# Patient Record
Sex: MALE | Race: WHITE | NOT HISPANIC OR LATINO | Employment: OTHER | URBAN - METROPOLITAN AREA
[De-identification: names, ages, dates, MRNs, and addresses within clinical notes are randomized per-mention and may not be internally consistent; named-entity substitution may affect disease eponyms.]

---

## 2021-07-07 ENCOUNTER — TELEPHONE (OUTPATIENT)
Dept: ORTHOPEDIC SURGERY | Facility: CLINIC | Age: 81
End: 2021-07-07

## 2021-07-07 NOTE — TELEPHONE ENCOUNTER
Dr. Oropeza is calling on behalf of the patient and says the patient is in a great deal of pain and would like to see if Dr. Ratliff can work him in sooner than his appointment scheduled for 7-15-21. Please advise.

## 2021-07-07 NOTE — TELEPHONE ENCOUNTER
Per RBB, he can see him as the last patient of the day tomorrow if this works for the patient.  Otherwise have to keep his appointment for next week.

## 2021-07-08 ENCOUNTER — OFFICE VISIT (OUTPATIENT)
Dept: ORTHOPEDIC SURGERY | Facility: CLINIC | Age: 81
End: 2021-07-08

## 2021-07-08 VITALS — WEIGHT: 209 LBS

## 2021-07-08 DIAGNOSIS — R52 PAIN: Primary | ICD-10-CM

## 2021-07-08 DIAGNOSIS — M79.605 PAIN OF LEFT LOWER EXTREMITY: ICD-10-CM

## 2021-07-08 PROCEDURE — 73630 X-RAY EXAM OF FOOT: CPT | Performed by: ORTHOPAEDIC SURGERY

## 2021-07-08 PROCEDURE — 99203 OFFICE O/P NEW LOW 30 MIN: CPT | Performed by: ORTHOPAEDIC SURGERY

## 2021-07-08 PROCEDURE — 73502 X-RAY EXAM HIP UNI 2-3 VIEWS: CPT | Performed by: ORTHOPAEDIC SURGERY

## 2021-07-08 RX ORDER — AMLODIPINE BESYLATE 10 MG/1
10 TABLET ORAL NIGHTLY
Status: ON HOLD | COMMUNITY
Start: 2021-06-01 | End: 2022-01-25

## 2021-07-08 RX ORDER — ATORVASTATIN CALCIUM 40 MG/1
40 TABLET, FILM COATED ORAL NIGHTLY
COMMUNITY
Start: 2021-05-27

## 2021-07-08 RX ORDER — TAMSULOSIN HYDROCHLORIDE 0.4 MG/1
1 CAPSULE ORAL
COMMUNITY
Start: 2021-05-19 | End: 2022-01-24

## 2021-07-08 RX ORDER — ATENOLOL 50 MG/1
50 TABLET ORAL NIGHTLY
COMMUNITY
Start: 2021-06-25 | End: 2022-07-31 | Stop reason: HOSPADM

## 2021-07-08 RX ORDER — METHYLPREDNISOLONE 4 MG/1
TABLET ORAL
Qty: 21 TABLET | Refills: 0 | Status: SHIPPED | OUTPATIENT
Start: 2021-07-08 | End: 2021-07-23

## 2021-07-08 RX ORDER — HYDROCHLOROTHIAZIDE 25 MG/1
25 TABLET ORAL NIGHTLY
COMMUNITY
Start: 2021-06-10 | End: 2022-11-03

## 2021-07-08 NOTE — PROGRESS NOTES
Patient: Sohan De La Torre  YOB: 1940 80 y.o. male  Medical Record Number: [id number]    Chief Complaints:   Chief Complaint   Patient presents with   • Left Hip - Pain   • Left Foot - Pain       History of Present Illness:Sohan De La Torre is a 80 y.o. male who presents with left leg numbness he has pain and numbness which will radiate all the way down the anterior thigh into the lateral calf and toes.  It started about 10 days ago.  He denies a history of trauma or change in activity level.  He denies any previous lumbar spine issues.  He states the leg feels weak and as though it wants to give out on him.    Allergies: No Known Allergies    Medications:   Current Outpatient Medications   Medication Sig Dispense Refill   • amLODIPine (NORVASC) 10 MG tablet      • atenolol (TENORMIN) 50 MG tablet      • atorvastatin (LIPITOR) 40 MG tablet      • hydroCHLOROthiazide (HYDRODIURIL) 25 MG tablet      • tamsulosin (FLOMAX) 0.4 MG capsule 24 hr capsule      • methylPREDNISolone (MEDROL) 4 MG dose pack Use as directed by package instructions 21 tablet 0     No current facility-administered medications for this visit.         The following portions of the patient's history were reviewed and updated as appropriate: allergies, current medications, past family history, past medical history, past social history, past surgical history and problem list.    Review of Systems:   A 14 point review of systems was performed. All systems negative except pertinent positives/negative listed in HPI above    Physical Exam:   Vitals:    07/08/21 1625   Weight: 94.8 kg (209 lb)       General: A and O x 3, ASA, NAD    SCLERA:    Normal    DENTITION:   Normal  There is a positive straight leg raise.  He has some weakness with leg extension in comparison to opposite side.  There is no pain or limitation of hip or knee range of motion he walks with a slight antalgic gait.  The skin is normal in appearance.       Radiology:  Xrays  2views left hip  (AP bilateral hips, and lateral of the hip) were ordered and reviewed demonstrating  no abnormality.  There are no previous notes or comparison.    X-rays 3 views of left foot ordered and reviewed for evaluation of foot pain which show no abnormality.  There are no previous films or comparison.    Assessment/Plan: Acute onset of severe left hip leg pain numbness and weakness.  I am concerned this is lumbar radicular in origin I am going to get a stat MRI given the weakness and numbness and as soon as we get some information we will call him back.  In the meantime starting him on a Medrol Dosepak.      Roger Ratliff MD  7/8/2021

## 2021-07-13 ENCOUNTER — TELEPHONE (OUTPATIENT)
Dept: ORTHOPEDIC SURGERY | Facility: CLINIC | Age: 81
End: 2021-07-13

## 2021-07-13 DIAGNOSIS — M54.16 LUMBAR RADICULOPATHY: Primary | ICD-10-CM

## 2021-07-23 ENCOUNTER — HOSPITAL ENCOUNTER (OUTPATIENT)
Dept: GENERAL RADIOLOGY | Facility: HOSPITAL | Age: 81
Discharge: HOME | End: 2021-07-23

## 2021-07-23 ENCOUNTER — HOSPITAL ENCOUNTER (OUTPATIENT)
Dept: PAIN MEDICINE | Facility: HOSPITAL | Age: 81
Discharge: HOME | End: 2021-07-23

## 2021-07-23 ENCOUNTER — ANESTHESIA (OUTPATIENT)
Dept: PAIN MEDICINE | Facility: HOSPITAL | Age: 81
End: 2021-07-23

## 2021-07-23 ENCOUNTER — ANESTHESIA EVENT (OUTPATIENT)
Dept: PAIN MEDICINE | Facility: HOSPITAL | Age: 81
End: 2021-07-23

## 2021-07-23 VITALS
RESPIRATION RATE: 16 BRPM | HEART RATE: 81 BPM | TEMPERATURE: 97.7 F | OXYGEN SATURATION: 96 % | SYSTOLIC BLOOD PRESSURE: 120 MMHG | DIASTOLIC BLOOD PRESSURE: 75 MMHG

## 2021-07-23 DIAGNOSIS — R52 PAIN: ICD-10-CM

## 2021-07-23 DIAGNOSIS — M48.061 LUMBAR FORAMINAL STENOSIS: Primary | ICD-10-CM

## 2021-07-23 PROCEDURE — 25010000002 METHYLPREDNISOLONE PER 80 MG: Performed by: ANESTHESIOLOGY

## 2021-07-23 PROCEDURE — 0 IOPAMIDOL 41 % SOLUTION: Performed by: ANESTHESIOLOGY

## 2021-07-23 PROCEDURE — 77003 FLUOROGUIDE FOR SPINE INJECT: CPT

## 2021-07-23 RX ORDER — MIDAZOLAM HYDROCHLORIDE 1 MG/ML
1 INJECTION INTRAMUSCULAR; INTRAVENOUS AS NEEDED
Status: DISCONTINUED | OUTPATIENT
Start: 2021-07-23 | End: 2021-07-24 | Stop reason: HOSPADM

## 2021-07-23 RX ORDER — FENTANYL CITRATE 50 UG/ML
50 INJECTION, SOLUTION INTRAMUSCULAR; INTRAVENOUS AS NEEDED
Status: DISCONTINUED | OUTPATIENT
Start: 2021-07-23 | End: 2021-07-24 | Stop reason: HOSPADM

## 2021-07-23 RX ORDER — LIDOCAINE HYDROCHLORIDE 10 MG/ML
1 INJECTION, SOLUTION INFILTRATION; PERINEURAL ONCE AS NEEDED
Status: DISCONTINUED | OUTPATIENT
Start: 2021-07-23 | End: 2021-07-24 | Stop reason: HOSPADM

## 2021-07-23 RX ORDER — SODIUM CHLORIDE 0.9 % (FLUSH) 0.9 %
1-10 SYRINGE (ML) INJECTION AS NEEDED
Status: DISCONTINUED | OUTPATIENT
Start: 2021-07-23 | End: 2021-07-24 | Stop reason: HOSPADM

## 2021-07-23 RX ORDER — METHYLPREDNISOLONE ACETATE 80 MG/ML
80 INJECTION, SUSPENSION INTRA-ARTICULAR; INTRALESIONAL; INTRAMUSCULAR; SOFT TISSUE ONCE
Status: COMPLETED | OUTPATIENT
Start: 2021-07-23 | End: 2021-07-23

## 2021-07-23 RX ADMIN — IOPAMIDOL 10 ML: 408 INJECTION, SOLUTION INTRATHECAL at 12:19

## 2021-07-23 RX ADMIN — METHYLPREDNISOLONE ACETATE 80 MG: 80 INJECTION, SUSPENSION INTRA-ARTICULAR; INTRALESIONAL; INTRAMUSCULAR; SOFT TISSUE at 12:19

## 2021-07-23 NOTE — ANESTHESIA PROCEDURE NOTES
PAIN Epidural block    Pre-sedation assessment completed: 7/23/2021 12:10 PM    Patient reassessed immediately prior to procedure    Patient location during procedure: pain clinic  Indication:procedure for pain  Performed By  Anesthesiologist: Tani Li MD  Preanesthetic Checklist  Completed: patient identified, IV checked, site marked, risks and benefits discussed, surgical consent, monitors and equipment checked, pre-op evaluation and timeout performed  Additional Notes  Performed under fluoroscopy  Post-Op Diagnosis Codes:     * Lumbar degenerative disc disease (M51.36)     * Lumbar foraminal stenosis (M48.061)  Prep:  Pt Position:prone  Sterile Tech:cap, gloves, mask and sterile barrier  Prep:chlorhexidine gluconate and isopropyl alcohol  Monitoring:blood pressure monitoring, continuous pulse oximetry and EKG  Procedure:Sedation: no     Approach:left paramedian  Guidance: fluoroscopy  Location:lumbar (Intralaminar)  Level:L5-S1  Needle Type:Tuohy  Needle Gauge:20 G  Aspiration:negative  Medications:  Depomedrol:80 mg  Preservative Free Saline:4mL  Isovue:3mL  Comments:Needle placement confirmed with lateral fluroscopy and epidural spread of contrast injection.  Post Assessment:  Post-procedure: Bandaid.  Pt Tolerance:patient tolerated the procedure well with no apparent complications  Complications:no

## 2021-07-23 NOTE — H&P
[facility]    History and Physical    Patient Name: Sohan De La Torre  :  1940  MRN:  [id number]  Date of Admission: 2021    Subjective     Patient is a 80 y.o. male presents with chief complaint of acute leg: left pain.  Onset of symptoms was abrupt starting several weeks ago.  Symptoms are associated/aggravated by activity, lifting, standing or walking for more than a few minutes. Symptoms improve with ice. His lumbar MRI report showed degeneration and foraminal stenosis worse at L5-S1.    The following portions of the patients history were reviewed and updated as appropriate: current medications, allergies, past medical history, past surgical history, past family history, past social history and problem list                Objective     Past Medical History:   Past Medical History:   Diagnosis Date   • Hyperlipidemia    • Hypertension    • Low back pain      Past Surgical History: History reviewed. No pertinent surgical history.  Family History: No family history on file.  Social History:   Social History     Socioeconomic History   • Marital status:      Spouse name: Not on file   • Number of children: Not on file   • Years of education: Not on file   • Highest education level: Not on file   Tobacco Use   • Smoking status: Former Smoker   • Smokeless tobacco: Never Used   • Tobacco comment: 25 YEARS AGO   Substance and Sexual Activity   • Alcohol use: Yes     Comment: 2-3 TIMES WEEKLY   • Drug use: Never   • Sexual activity: Defer       Vital Signs Range for the last 24 hours  Temperature: Temp:  [36.5 °C (97.7 °F)] 36.5 °C (97.7 °F)   Temp Source: Temp src: Infrared   BP: BP: (138)/(89) 138/89   Pulse: Heart Rate:  [92] 92   Respirations: Resp:  [16] 16   SPO2: SpO2:  [98 %] 98 %   O2 Amount (l/min):     O2 Devices Device (Oxygen Therapy): room air   Weight:           --------------------------------------------------------------------------------    Current Outpatient Medications    Medication Sig Dispense Refill   • amLODIPine (NORVASC) 10 MG tablet      • atenolol (TENORMIN) 50 MG tablet      • atorvastatin (LIPITOR) 40 MG tablet      • hydroCHLOROthiazide (HYDRODIURIL) 25 MG tablet      • tamsulosin (FLOMAX) 0.4 MG capsule 24 hr capsule        Current Facility-Administered Medications   Medication Dose Route Frequency Provider Last Rate Last Admin   • fentaNYL citrate (PF) (SUBLIMAZE) injection 50 mcg  50 mcg Intravenous PRN Tani Li MD       • iopamidol (ISOVUE-M 200) injection 41%  12 mL Epidural Once in imaging Tani Li MD       • lidocaine (XYLOCAINE) 1 % injection 1 mL  1 mL Intradermal Once PRN Tani Li MD       • methylPREDNISolone acetate (DEPO-medrol) injection 80 mg  80 mg Intra-articular Once Tani Li MD       • midazolam (VERSED) injection 1 mg  1 mg Intravenous PRN Tani Li MD       • sodium chloride 0.9 % flush 1-10 mL  1-10 mL Intravenous PRN Tani Li MD           --------------------------------------------------------------------------------  Assessment/Plan      Anesthesia Evaluation           Pain impairs ability to perform ADLs: Ambulation, Exercise/Activity and Sleeping  Modalities previously tried to control pain with limited effectiveness within the last 4-6 weeks: Ice and Rest     Airway   Mallampati: II  Dental - normal exam     Pulmonary - normal exam    breath sounds clear to auscultation  Cardiovascular - normal exam        Neuro/Psych  normal reflexes and symmetric  (-) strength not normal in all 4 extremities, left straight leg raise test, right straight leg raise test  GI/Hepatic/Renal/Endo      Musculoskeletal     Abdominal    Substance History      OB/GYN          Other                   Diagnosis and Plan    Treatment Plan  ASA 2      Procedures: Lumbar Epidural Steroid Injection(LESI), With fluoroscopy,       Anesthetic plan and risks discussed with patient.          Diagnosis     * Lumbar degenerative disc disease  [M51.36]     * Lumbar foraminal stenosis [M48.061]

## 2021-08-09 ENCOUNTER — TELEPHONE (OUTPATIENT)
Dept: PAIN MEDICINE | Facility: HOSPITAL | Age: 81
End: 2021-08-09

## 2021-08-09 NOTE — TELEPHONE ENCOUNTER
..What would you rate your pain on a 1-10 scale...   Prior to your last procedure? 8   On the best days following your last procedure? 7   Currently? 8    How frequently were you having pain...   Prior to your last procedure? constantly   On the best days following your last procedure?  constantly   Currently? constantly    When you do have pain does an episode last as long as before your last procedure? yes  What has been the difference?      Has your function improved?  Are you walking farther or more often? I can sit down with ice packs on me   If so, how much farther and how much more often than before your last procedure?    Have you tried/continued to try any other treatments since your last procedure? (Chiropractor, physical therapy, massage, yoga, back exercises, heat/ice) have done the exercises in the discharge papers, I use ice    What medications are you currently taking to help with your pain? Ibuprofen  (Narcotics, muscle relaxer's, NSAID's, Tylenol, other)    Considering all factors what % improvement would you say you have had overall? 0%   At its best following your last procedure? 10%   Currently? 10%    Is there anything else you'd like to tell us that has improved since your last procedure? Pain limits my activites, getting about 4 hours sleep then get up and get ice

## 2021-08-20 ENCOUNTER — HOSPITAL ENCOUNTER (OUTPATIENT)
Dept: GENERAL RADIOLOGY | Facility: HOSPITAL | Age: 81
Discharge: HOME | End: 2021-08-20

## 2021-08-20 ENCOUNTER — HOSPITAL ENCOUNTER (OUTPATIENT)
Dept: PAIN MEDICINE | Facility: HOSPITAL | Age: 81
Discharge: HOME | End: 2021-08-20

## 2021-08-20 ENCOUNTER — ANESTHESIA EVENT (OUTPATIENT)
Dept: PAIN MEDICINE | Facility: HOSPITAL | Age: 81
End: 2021-08-20

## 2021-08-20 ENCOUNTER — ANESTHESIA (OUTPATIENT)
Dept: PAIN MEDICINE | Facility: HOSPITAL | Age: 81
End: 2021-08-20

## 2021-08-20 VITALS
SYSTOLIC BLOOD PRESSURE: 136 MMHG | RESPIRATION RATE: 16 BRPM | DIASTOLIC BLOOD PRESSURE: 79 MMHG | TEMPERATURE: 97.3 F | OXYGEN SATURATION: 96 % | HEART RATE: 74 BPM

## 2021-08-20 DIAGNOSIS — M54.16 LUMBAR RADICULOPATHY: Primary | ICD-10-CM

## 2021-08-20 DIAGNOSIS — R52 PAIN: ICD-10-CM

## 2021-08-20 PROCEDURE — 77003 FLUOROGUIDE FOR SPINE INJECT: CPT

## 2021-08-20 PROCEDURE — 0 IOPAMIDOL 41 % SOLUTION: Performed by: ANESTHESIOLOGY

## 2021-08-20 PROCEDURE — 25010000002 METHYLPREDNISOLONE PER 80 MG: Performed by: ANESTHESIOLOGY

## 2021-08-20 RX ORDER — LIDOCAINE HYDROCHLORIDE 10 MG/ML
1 INJECTION, SOLUTION INFILTRATION; PERINEURAL ONCE
Status: DISCONTINUED | OUTPATIENT
Start: 2021-08-20 | End: 2021-08-21 | Stop reason: HOSPADM

## 2021-08-20 RX ORDER — FENTANYL CITRATE 50 UG/ML
50 INJECTION, SOLUTION INTRAMUSCULAR; INTRAVENOUS AS NEEDED
Status: DISCONTINUED | OUTPATIENT
Start: 2021-08-20 | End: 2021-08-21 | Stop reason: HOSPADM

## 2021-08-20 RX ORDER — MIDAZOLAM HYDROCHLORIDE 1 MG/ML
1 INJECTION INTRAMUSCULAR; INTRAVENOUS
Status: DISCONTINUED | OUTPATIENT
Start: 2021-08-20 | End: 2021-08-21 | Stop reason: HOSPADM

## 2021-08-20 RX ORDER — METHYLPREDNISOLONE ACETATE 80 MG/ML
80 INJECTION, SUSPENSION INTRA-ARTICULAR; INTRALESIONAL; INTRAMUSCULAR; SOFT TISSUE ONCE
Status: COMPLETED | OUTPATIENT
Start: 2021-08-20 | End: 2021-08-20

## 2021-08-20 RX ADMIN — METHYLPREDNISOLONE ACETATE 80 MG: 80 INJECTION, SUSPENSION INTRA-ARTICULAR; INTRALESIONAL; INTRAMUSCULAR; SOFT TISSUE at 08:57

## 2021-08-20 RX ADMIN — IOPAMIDOL 10 ML: 408 INJECTION, SOLUTION INTRATHECAL at 08:57

## 2021-08-20 NOTE — ANESTHESIA PROCEDURE NOTES
PAIN Epidural block      Patient reassessed immediately prior to procedure    Patient location during procedure: pain clinic  Indication:procedure for pain  Performed By  Anesthesiologist: Ronn Bonilla MD  Preanesthetic Checklist  Completed: patient identified, site marked, risks and benefits discussed, surgical consent, monitors and equipment checked, pre-op evaluation and timeout performed  Additional Notes  Depomedrol - 80mg    Needle position confirmed by fluoroscopy and epidurogram using 2cc of [id number].    Diagnosis  Post-Op Diagnosis Codes:     * Lumbar radiculopathy (M54.16)     * Lumbar degenerative disc disease (M51.36)     * Lumbar foraminal stenosis (M48.061)    Prep:  Pt Position:prone  Sterile Tech:cap, gloves, mask and sterile barrier  Prep:chlorhexidine gluconate and isopropyl alcohol  Monitoring:blood pressure monitoring, continuous pulse oximetry and EKG  Procedure:Sedation: no     Approach:left paramedian  Guidance: fluoroscopy  Location:lumbar  Level:L5-S1  Needle Type:Tuohy  Needle Gauge:20  Aspiration:negative  Medications:  Depomedrol:80 mg  Preservative Free Saline:2mL  Isovue:2mL    Post Assessment:  Post-procedure: bandaide.  Pt Tolerance:patient tolerated the procedure well with no apparent complications  Complications:no

## 2021-08-20 NOTE — INTERVAL H&P NOTE
[facility]  H&P reviewed. No changes since last visit.  Patient states   25-50% improvement since the last procedure/injection.    Diagnosis     * Lumbar radiculopathy [M54.16]     * Lumbar degenerative disc disease [M51.36]     * Lumbar foraminal stenosis [M48.061]      Airway assessed since last visit. Airway class equals: 2.

## 2021-09-01 ENCOUNTER — TELEPHONE (OUTPATIENT)
Dept: NEUROSURGERY | Facility: CLINIC | Age: 81
End: 2021-09-01

## 2021-09-02 ENCOUNTER — OFFICE VISIT (OUTPATIENT)
Dept: NEUROSURGERY | Facility: CLINIC | Age: 81
End: 2021-09-02

## 2021-09-02 VITALS — TEMPERATURE: 99.1 F | DIASTOLIC BLOOD PRESSURE: 92 MMHG | WEIGHT: 203.2 LBS | SYSTOLIC BLOOD PRESSURE: 148 MMHG

## 2021-09-02 DIAGNOSIS — Q76.2 CONGENITAL SPONDYLOLYSIS, LUMBOSACRAL REGION: Primary | ICD-10-CM

## 2021-09-02 DIAGNOSIS — M54.16 LEFT LUMBAR RADICULOPATHY: ICD-10-CM

## 2021-09-02 PROCEDURE — 99204 OFFICE O/P NEW MOD 45 MIN: CPT | Performed by: NEUROLOGICAL SURGERY

## 2021-09-02 RX ORDER — GABAPENTIN 300 MG/1
CAPSULE ORAL
Qty: 60 CAPSULE | Refills: 3 | Status: SHIPPED | OUTPATIENT
Start: 2021-09-02 | End: 2022-01-14

## 2021-09-02 NOTE — PROGRESS NOTES
Subjective   Patient ID: Sohan De La Torre is a 80 y.o. male is being seen for consultation today at the request of Dr. Oropeza.  He is being seen for low back pain.      7/8/21 MRI [facility]    Today patient reports left leg pain and left foot pain.  He states he has plantar fasciatis.  The patient is a retired .  He is generally healthy and very active.  He has had a history of some probable plantar fasciitis in the sole of his left foot.  About 3 or 4 months ago, he began having some left-sided buttock and radiating leg pain that has progressed and continued unabated.  He seems to feel better when he is sitting or lying down, but when he stands or walks he has more pain and cannot really get very far.  He has been through physical therapy, which did not help.  He has had 2 epidural blocks, which did not help.  He had an MRI, which showed grade 2 L5-S1 isthmic spondylolisthesis with root compression and a pseudodisk extrusion.  He is here for my opinion.  He said his life has been totally turned around and he feels completely dysfunctional.  He takes ibuprofen, which does not help. I told him that I thought if he did not feel like he could live with the symptoms that a L5-S1 decompression and fusion with interbody cages and transcortical pedicle screw fixation would be the next step.  He was not quite sure that he was ready for that.  I told him that we could try some gabapentin at 300 mg b.i.d.  It could help, but I doubt that it would get rid of it entirely.  I do not think a 3rd block is worth doing since 2 did not help.  He will try the gabapentin and we will do a TeleVisit in 3 weeks to see how he is doing.  He does have a history of aortic stenosis that was discovered almost incidentally.  If we were to move forward with surgery, he would need cardiac clearance.  We will wait on that until we do the TeleVisit in 3 weeks.         leg    Leg Pain   The pain is present in the left leg and left  foot. The quality of the pain is described as burning. The pain has been intermittent since onset. Associated symptoms include an inability to bear weight and numbness. He has tried ice for the symptoms. The treatment provided mild relief.       The following portions of the patient's history were reviewed and updated as appropriate: allergies, current medications, past family history, past medical history, past social history, past surgical history and problem list.    Review of Systems   Constitutional: Negative for chills and fever.   HENT: Negative for congestion.    Eyes: Negative for visual disturbance.   Respiratory: Negative for shortness of breath.    Cardiovascular: Negative for palpitations.   Gastrointestinal: Negative for nausea.   Endocrine: Negative for cold intolerance.   Genitourinary: Negative for urgency.   Musculoskeletal: Negative for neck pain.   Skin: Negative for rash.   Allergic/Immunologic: Negative for environmental allergies.   Neurological: Positive for numbness.   Hematological: Negative for adenopathy.   Psychiatric/Behavioral: Negative for sleep disturbance.   All other systems reviewed and are negative.          Objective     Vitals:    09/02/21 1241   BP: 148/92   Temp: 99.1 °F (37.3 °C)   Weight: 92.2 kg (203 lb 3.2 oz)     There is no height or weight on file to calculate BMI.      Physical Exam  Constitutional:       Appearance: He is well-developed.   HENT:      Head: Normocephalic and atraumatic.   Eyes:      Extraocular Movements: EOM normal.      Conjunctiva/sclera: Conjunctivae normal.      Pupils: Pupils are equal, round, and reactive to light.   Neck:      Vascular: No carotid bruit.   Neurological:      Mental Status: He is oriented to person, place, and time.      Coordination: Finger-Nose-Finger Test and Heel to Shin Test normal.      Gait: Gait is intact.      Deep Tendon Reflexes:      Reflex Scores:       Tricep reflexes are 2+ on the right side and 2+ on the left  side.       Bicep reflexes are 2+ on the right side and 2+ on the left side.       Brachioradialis reflexes are 2+ on the right side and 2+ on the left side.       Patellar reflexes are 2+ on the right side and 2+ on the left side.       Achilles reflexes are 2+ on the right side and 2+ on the left side.  Psychiatric:         Speech: Speech normal.       Neurologic Exam     Mental Status   Oriented to person, place, and time.   Registration of memory: Good recent and remote memory.   Attention: normal. Concentration: normal.   Speech: speech is normal   Level of consciousness: alert  Knowledge: consistent with education.     Cranial Nerves     CN II   Visual fields full to confrontation.   Visual acuity: normal    CN III, IV, VI   Pupils are equal, round, and reactive to light.  Extraocular motions are normal.     CN V   Facial sensation intact.   Right corneal reflex: normal  Left corneal reflex: normal    CN VII   Facial expression full, symmetric.   Right facial weakness: none  Left facial weakness: none    CN VIII   Hearing: intact    CN IX, X   Palate: symmetric    CN XI   Right sternocleidomastoid strength: normal  Left sternocleidomastoid strength: normal    CN XII   Tongue: not atrophic  Tongue deviation: none    Motor Exam   Muscle bulk: normal  Right arm tone: normal  Left arm tone: normal  Right leg tone: normal  Left leg tone: normal    Strength   Strength 5/5 except as noted.     Sensory Exam   Light touch normal.     Gait, Coordination, and Reflexes     Gait  Gait: normal    Coordination   Finger to nose coordination: normal  Heel to shin coordination: normal    Reflexes   Right brachioradialis: 2+  Left brachioradialis: 2+  Right biceps: 2+  Left biceps: 2+  Right triceps: 2+  Left triceps: 2+  Right patellar: 2+  Left patellar: 2+  Right achilles: 2+  Left achilles: 2+  Right : 2+  Left : 2+          Assessment/Plan   Independent Review of Radiographic Studies:      I personally reviewed the  images from the following studies.    I reviewed the lumbar MRI done on 7/8/2021 which shows an L5-S1 isthmic spondylolisthesis with a grade 2 slip and bilateral pars defects.  Agree with the report.    Medical Decision Making:      We will try gabapentin at 300 mg twice daily.  He will think about the recommendation for surgery.  We will do a televisit in 3 weeks.  If he wants to move forward with surgery, we will set that up as described above but we will need cardiac clearance first.      Diagnoses and all orders for this visit:    1. Congenital spondylolysis, lumbosacral region (Primary)  -     gabapentin (NEURONTIN) 300 MG capsule; Take 1 capsule p.o. nightly x7 days then twice daily  Dispense: 60 capsule; Refill: 3    2. Left lumbar radiculopathy  -     gabapentin (NEURONTIN) 300 MG capsule; Take 1 capsule p.o. nightly x7 days then twice daily  Dispense: 60 capsule; Refill: 3      Return in about 3 weeks (around 9/23/2021) for As a televisit.

## 2022-01-13 ENCOUNTER — TRANSCRIBE ORDERS (OUTPATIENT)
Dept: ADMINISTRATIVE | Facility: HOSPITAL | Age: 82
End: 2022-01-13

## 2022-01-13 DIAGNOSIS — I96 GANGRENE: ICD-10-CM

## 2022-01-13 DIAGNOSIS — I73.9 PVD (PERIPHERAL VASCULAR DISEASE): Primary | ICD-10-CM

## 2022-01-13 DIAGNOSIS — I65.23 CAROTID STENOSIS, BILATERAL: ICD-10-CM

## 2022-01-14 ENCOUNTER — HOSPITAL ENCOUNTER (OUTPATIENT)
Dept: CARDIOLOGY | Facility: HOSPITAL | Age: 82
Discharge: HOME | End: 2022-01-14

## 2022-01-14 ENCOUNTER — OFFICE VISIT (OUTPATIENT)
Dept: CARDIOLOGY | Facility: CLINIC | Age: 82
End: 2022-01-14

## 2022-01-14 VITALS
WEIGHT: 205.2 LBS | HEIGHT: 71 IN | DIASTOLIC BLOOD PRESSURE: 72 MMHG | SYSTOLIC BLOOD PRESSURE: 122 MMHG | HEART RATE: 74 BPM | BODY MASS INDEX: 28.73 KG/M2 | OXYGEN SATURATION: 97 %

## 2022-01-14 DIAGNOSIS — E78.00 HYPERCHOLESTEROLEMIA: ICD-10-CM

## 2022-01-14 DIAGNOSIS — I73.9 PVD (PERIPHERAL VASCULAR DISEASE): ICD-10-CM

## 2022-01-14 DIAGNOSIS — R06.02 EXERTIONAL SHORTNESS OF BREATH: ICD-10-CM

## 2022-01-14 DIAGNOSIS — I10 ESSENTIAL HYPERTENSION: Primary | ICD-10-CM

## 2022-01-14 DIAGNOSIS — I96 GANGRENE: ICD-10-CM

## 2022-01-14 DIAGNOSIS — Z01.810 PREOPERATIVE CARDIOVASCULAR EXAMINATION: ICD-10-CM

## 2022-01-14 DIAGNOSIS — I65.23 CAROTID STENOSIS, BILATERAL: ICD-10-CM

## 2022-01-14 LAB
BH CV LOWER ARTERIAL LEFT ABI RATIO: 0.48
BH CV LOWER ARTERIAL LEFT DORSALIS PEDIS SYS MAX: 68 MMHG
BH CV LOWER ARTERIAL LEFT GREAT TOE SYS MAX: 28 MMHG
BH CV LOWER ARTERIAL LEFT HIGH THIGH SYS MAX: 147 MMHG
BH CV LOWER ARTERIAL LEFT LOW THIGH SYS MAX: 95 MMHG
BH CV LOWER ARTERIAL LEFT POPLITEAL SYS MAX: 70 MMHG
BH CV LOWER ARTERIAL LEFT POST TIBIAL SYS MAX: 0 MMHG
BH CV LOWER ARTERIAL LEFT TBI RATIO: 0.2
BH CV LOWER ARTERIAL RIGHT ABI RATIO: 0.72
BH CV LOWER ARTERIAL RIGHT DORSALIS PEDIS SYS MAX: 101 MMHG
BH CV LOWER ARTERIAL RIGHT HIGH THIGH SYS MAX: 157 MMHG
BH CV LOWER ARTERIAL RIGHT POPLITEAL SYS MAX: 96 MMHG
BH CV LOWER ARTERIAL RIGHT POST TIBIAL SYS MAX: 84 MMHG
BH CV XLRA MEAS - DIST GSV CALF DIST LEFT: 0.34 CM
BH CV XLRA MEAS - DIST GSV THIGH DIST LEFT: 0.37 CM
BH CV XLRA MEAS - DIST LSV CALF DIST LEFT: 0.24 CM
BH CV XLRA MEAS - GSV ANKLE DIST LEFT: 0.33 CM
BH CV XLRA MEAS - GSV KNEE DIST LEFT: 0.33 CM
BH CV XLRA MEAS - GSV ORIGIN DIST LEFT: 0.61 CM
BH CV XLRA MEAS - MID GSV CALF LEFT: 0.3 CM
BH CV XLRA MEAS - MID GSV THIGH  LEFT: 0.35 CM
BH CV XLRA MEAS - MID LSV CALF DIST LEFT: 0.21 CM
BH CV XLRA MEAS - PROX GSV CALF DIST LEFT: 0.26 CM
BH CV XLRA MEAS - PROX GSV THIGH  LEFT: 0.69 CM
BH CV XLRA MEAS - PROX LSV CALF DIST LEFT: 0.17 CM
BH CV XLRA MEAS LEFT CCA RATIO VEL: -87.4 CM/SEC
BH CV XLRA MEAS LEFT DIST CCA EDV: 17.6 CM/SEC
BH CV XLRA MEAS LEFT DIST CCA PSV: 87.4 CM/SEC
BH CV XLRA MEAS LEFT DIST ICA EDV: 24.6 CM/SEC
BH CV XLRA MEAS LEFT DIST ICA PSV: 92.6 CM/SEC
BH CV XLRA MEAS LEFT ICA RATIO VEL: -96.2 CM/SEC
BH CV XLRA MEAS LEFT ICA/CCA RATIO: 1.1
BH CV XLRA MEAS LEFT MID ICA EDV: 25.8 CM/SEC
BH CV XLRA MEAS LEFT MID ICA PSV: 96.2 CM/SEC
BH CV XLRA MEAS LEFT PROX CCA EDV: 15.8 CM/SEC
BH CV XLRA MEAS LEFT PROX CCA PSV: 87.4 CM/SEC
BH CV XLRA MEAS LEFT PROX ECA EDV: 11.8 CM/SEC
BH CV XLRA MEAS LEFT PROX ECA PSV: 148 CM/SEC
BH CV XLRA MEAS LEFT PROX ICA EDV: 17.3 CM/SEC
BH CV XLRA MEAS LEFT PROX ICA PSV: 51.9 CM/SEC
BH CV XLRA MEAS LEFT PROX SCLA PSV: 136 CM/SEC
BH CV XLRA MEAS LEFT VERTEBRAL A EDV: 10.7 CM/SEC
BH CV XLRA MEAS LEFT VERTEBRAL A PSV: 42.9 CM/SEC
BH CV XLRA MEAS RIGHT CCA RATIO VEL: -64.4 CM/SEC
BH CV XLRA MEAS RIGHT DIST CCA EDV: 11 CM/SEC
BH CV XLRA MEAS RIGHT DIST CCA PSV: 64.4 CM/SEC
BH CV XLRA MEAS RIGHT DIST ICA EDV: 20.4 CM/SEC
BH CV XLRA MEAS RIGHT DIST ICA PSV: 74.3 CM/SEC
BH CV XLRA MEAS RIGHT ICA RATIO VEL: 156 CM/SEC
BH CV XLRA MEAS RIGHT ICA/CCA RATIO: -2.4
BH CV XLRA MEAS RIGHT MID CCA EDV: 12.6 CM/SEC
BH CV XLRA MEAS RIGHT MID CCA PSV: 59.3 CM/SEC
BH CV XLRA MEAS RIGHT MID ICA EDV: 42.4 CM/SEC
BH CV XLRA MEAS RIGHT MID ICA PSV: 156 CM/SEC
BH CV XLRA MEAS RIGHT PROX CCA EDV: 12.9 CM/SEC
BH CV XLRA MEAS RIGHT PROX CCA PSV: 68 CM/SEC
BH CV XLRA MEAS RIGHT PROX ECA EDV: 12.8 CM/SEC
BH CV XLRA MEAS RIGHT PROX ECA PSV: 141 CM/SEC
BH CV XLRA MEAS RIGHT PROX ICA EDV: 33.8 CM/SEC
BH CV XLRA MEAS RIGHT PROX ICA PSV: 130 CM/SEC
BH CV XLRA MEAS RIGHT PROX SCLA PSV: 95.7 CM/SEC
BH CV XLRA MEAS RIGHT VERTEBRAL A EDV: 20.8 CM/SEC
BH CV XLRA MEAS RIGHT VERTEBRAL A PSV: 66.8 CM/SEC
LEFT ARM BP: NORMAL MMHG
MAXIMAL PREDICTED HEART RATE: 140 BPM
MAXIMAL PREDICTED HEART RATE: 140 BPM
RIGHT ARM BP: NORMAL MMHG
STRESS TARGET HR: 119 BPM
STRESS TARGET HR: 119 BPM
UPPER ARTERIAL LEFT ARM BRACHIAL SYS MAX: 134 MMHG
UPPER ARTERIAL RIGHT ARM BRACHIAL SYS MAX: 141 MMHG

## 2022-01-14 PROCEDURE — 93880 EXTRACRANIAL BILAT STUDY: CPT

## 2022-01-14 PROCEDURE — 93971 EXTREMITY STUDY: CPT

## 2022-01-14 PROCEDURE — 99204 OFFICE O/P NEW MOD 45 MIN: CPT | Performed by: INTERNAL MEDICINE

## 2022-01-14 PROCEDURE — 93923 UPR/LXTR ART STDY 3+ LVLS: CPT

## 2022-01-14 PROCEDURE — 93000 ELECTROCARDIOGRAM COMPLETE: CPT | Performed by: INTERNAL MEDICINE

## 2022-01-14 NOTE — PROGRESS NOTES
PATIENTINFORMATION    Date of Office Visit: 2022  Encounter Provider: Garbiel Joel MD  Place of Service: [facility] CARDIOLOGY  Patient Name: Sohan De La Torre  : 1940    Subjective:     Encounter Date:2022      Patient ID: Sohan De La Torre is a 81 y.o. male.    Chief Complaint   Patient presents with   • Surgical Clearance     HPI  Mr. De La Torre is a  pleasant 81 years old retired  and with past medical history of hypertension, hyperlipidemia referred to cardiology clinic for preoperative cardiovascular examination for possible left lower extremity intervention for recently diagnosed.  He has had left leg and foot pain and swelling and fracture of middle toe for the past few months that eventually prompted Doppler examination and found out to have occlusion of superficial femoral, popliteal arteries on the left side.  He also reports diagnosis of heart murmur many years ago and was told he has aortic valve stenosis.  He has some exertional shortness of breath but denies any significant chest discomfort.  He used to be very active and exercised a lot when he was younger.  Does not do regular exercise currently.  He has claudication pains just walking 50 feet.  He denies any current tobacco, recreational drug use or alcohol abuse.    No prior coronary angiogram or interventions.    ROS    All systems reviewed and negative except as noted    Past Medical History:   Diagnosis Date   • Hyperlipidemia    • Hypertension    • Low back pain        History reviewed. No pertinent surgical history.    Social History     Socioeconomic History   • Marital status:    Tobacco Use   • Smoking status: Former Smoker   • Smokeless tobacco: Never Used   • Tobacco comment: 25 YEARS AGO   Substance and Sexual Activity   • Alcohol use: Yes     Comment: 2-3 TIMES WEEKLY   • Drug use: Never   • Sexual activity: Defer       History reviewed. No pertinent family history.        ECG 12  "Lead    Date/Time: 1/14/2022 8:05 AM  Performed by: Gabriel Joel MD  Authorized by: Gabriel Joel MD   Comparison: compared with previous ECG   Rhythm: sinus rhythm  Rate: normal  Conduction: conduction normal  ST Segments: ST segments normal  T Waves: T waves normal  QRS axis: normal  Other: no other findings    Clinical impression: normal ECG               Objective:     /72 (BP Location: Left arm, Patient Position: Sitting)   Pulse 74   Ht 180.3 cm (71\")   Wt 93.1 kg (205 lb 3.2 oz)   SpO2 97%   BMI 28.62 kg/m²  Body mass index is 28.62 kg/m².     Constitutional:       General: Not in acute distress.     Appearance: Well-developed. Not diaphoretic.   Eyes:      Pupils: Pupils are equal, round, and reactive to light.   HENT:      Head: Normocephalic and atraumatic.   Neck:      Thyroid: No thyromegaly.   Pulmonary:      Effort: Pulmonary effort is normal. No respiratory distress.      Breath sounds: Normal breath sounds. No wheezing. No rales.   Chest:      Chest wall: Not tender to palpatation.   Cardiovascular:      Normal rate. Regular rhythm.      Murmurs: There is a harsh midsystolic murmur at the URSB, radiating to the neck.      No gallop.   Pulses:     Intact distal pulses.   Edema:     Peripheral edema absent.   Abdominal:      General: Bowel sounds are normal. There is no distension.      Palpations: Abdomen is soft.      Tenderness: There is no guarding.   Musculoskeletal: Normal range of motion.         General: No deformity.      Cervical back: Normal range of motion and neck supple. Skin:     General: Skin is warm and dry.      Findings: No rash.   Neurological:      Mental Status: Alert and oriented to person, place, and time.      Cranial Nerves: No cranial nerve deficit.      Deep Tendon Reflexes: Reflexes are normal and symmetric.   Psychiatric:         Judgment: Judgment normal.         Review Of Data:   I have reviewed labs and documents  Lower extremity Doppler done " on 1/6/2020 left lower extremity with evidence of occlusion in the mid to distal superficial femoral, popliteal, and proximal posterior tibial arteries.   Echocardiogram in April 2021 revealed preserved left ventricular ejection fraction with some LVH With no significant valvular abnormality        Assessment/Plan:       1. Preoperative cardiovascular examination for left lower extremity arterial intervention and following up with Dr Burger   2. Hypertension currently on atenolol 50, chlorthalidone 25, amlodipine 10-fairly controlled  3. Hypercholesterolemia on atorvastatin for years-most recent lipid panel not at goal-   4. BPH on Flomax  5. Peripheral arterial disease  6.  Aortic valve stenosis    METs compromised because of left lower extremity claudication.  He has some exertional shortness of breath.  Noted he is going to get carotid Doppler and CTA of lower extremities  Patient likely has coronary artery disease and I will go ahead and do myocardial perfusion study and also echocardiogram to evaluate for aortic valve stenosis.  Diagnosis and plan of care discussed with patient and verbalized understanding.           Gabriel Joel MD  01/14/22  10:59 EST

## 2022-01-20 ENCOUNTER — HOSPITAL ENCOUNTER (OUTPATIENT)
Dept: CT IMAGING | Facility: HOSPITAL | Age: 82
Discharge: HOME | End: 2022-01-20
Admitting: SURGERY

## 2022-01-20 DIAGNOSIS — I96 GANGRENE: ICD-10-CM

## 2022-01-20 DIAGNOSIS — I73.9 PVD (PERIPHERAL VASCULAR DISEASE): ICD-10-CM

## 2022-01-20 LAB — CREAT BLDA-MCNC: 1.2 MG/DL (ref 0.6–1.3)

## 2022-01-20 PROCEDURE — 0 IOPAMIDOL PER 1 ML: Performed by: SURGERY

## 2022-01-20 PROCEDURE — 75635 CT ANGIO ABDOMINAL ARTERIES: CPT

## 2022-01-20 PROCEDURE — 82565 ASSAY OF CREATININE: CPT

## 2022-01-20 RX ADMIN — IOPAMIDOL 96 ML: 755 INJECTION, SOLUTION INTRAVENOUS at 14:57

## 2022-01-24 ENCOUNTER — PRE-ADMISSION TESTING (OUTPATIENT)
Dept: PREADMISSION TESTING | Facility: HOSPITAL | Age: 82
End: 2022-01-24

## 2022-01-24 VITALS
WEIGHT: 204.4 LBS | DIASTOLIC BLOOD PRESSURE: 72 MMHG | RESPIRATION RATE: 16 BRPM | HEIGHT: 70 IN | OXYGEN SATURATION: 99 % | TEMPERATURE: 97.8 F | SYSTOLIC BLOOD PRESSURE: 138 MMHG | BODY MASS INDEX: 29.26 KG/M2 | HEART RATE: 51 BPM

## 2022-01-24 LAB
ANION GAP SERPL CALCULATED.3IONS-SCNC: 11.1 MMOL/L (ref 5–15)
BUN SERPL-MCNC: 20 MG/DL (ref 8–23)
BUN/CREAT SERPL: 20 (ref 7–25)
CALCIUM SPEC-SCNC: 9.4 MG/DL (ref 8.6–10.5)
CHLORIDE SERPL-SCNC: 102 MMOL/L (ref 98–107)
CO2 SERPL-SCNC: 22.9 MMOL/L (ref 22–29)
CREAT SERPL-MCNC: 1 MG/DL (ref 0.76–1.27)
DEPRECATED RDW RBC AUTO: 39.2 FL (ref 37–54)
ERYTHROCYTE [DISTWIDTH] IN BLOOD BY AUTOMATED COUNT: 12.5 % (ref 12.3–15.4)
GFR SERPL CREATININE-BSD FRML MDRD: 72 ML/MIN/1.73
GLUCOSE SERPL-MCNC: 176 MG/DL (ref 65–99)
HCT VFR BLD AUTO: 43.4 % (ref 37.5–51)
HGB BLD-MCNC: 14.6 G/DL (ref 13–17.7)
MCH RBC QN AUTO: 29.4 PG (ref 26.6–33)
MCHC RBC AUTO-ENTMCNC: 33.6 G/DL (ref 31.5–35.7)
MCV RBC AUTO: 87.5 FL (ref 79–97)
PLATELET # BLD AUTO: 352 10*3/MM3 (ref 140–450)
PMV BLD AUTO: 11 FL (ref 6–12)
POTASSIUM SERPL-SCNC: 3.9 MMOL/L (ref 3.5–5.2)
RBC # BLD AUTO: 4.96 10*6/MM3 (ref 4.14–5.8)
SARS-COV-2 ORF1AB RESP QL NAA+PROBE: NOT DETECTED
SODIUM SERPL-SCNC: 136 MMOL/L (ref 136–145)
WBC NRBC COR # BLD: 10.62 10*3/MM3 (ref 3.4–10.8)

## 2022-01-24 PROCEDURE — C9803 HOPD COVID-19 SPEC COLLECT: HCPCS

## 2022-01-24 PROCEDURE — 36415 COLL VENOUS BLD VENIPUNCTURE: CPT

## 2022-01-24 PROCEDURE — 80048 BASIC METABOLIC PNL TOTAL CA: CPT

## 2022-01-24 PROCEDURE — 85027 COMPLETE CBC AUTOMATED: CPT

## 2022-01-24 PROCEDURE — U0004 COV-19 TEST NON-CDC HGH THRU: HCPCS

## 2022-01-24 RX ORDER — CILOSTAZOL 100 MG/1
100 TABLET ORAL 2 TIMES DAILY
Status: ON HOLD | COMMUNITY
Start: 2022-01-14 | End: 2022-01-25

## 2022-01-24 NOTE — DISCHARGE INSTRUCTIONS
Take the following medications the morning of surgery:  NONE  BRING UPDATED MEDICINE LIST TO HOSPITAL DAY OF SURGERY    ARRIVE 10:30 AM  1/25      If you are on prescription narcotic pain medication to control your pain you may also take that medication the morning of surgery.    General Instructions:  • Do not eat solid food after midnight the night before surgery.  • You may drink clear liquids day of surgery but must stop at least one hour before your hospital arrival time.  • It is beneficial for you to have a clear drink that contains carbohydrates the day of surgery.  We suggest a 12 to 20 ounce bottle of Gatorade or Powerade for non-diabetic patients or a 12 to 20 ounce bottle of G2 or Powerade Zero for diabetic patients. (Pediatric patients, are not advised to drink a 12 to 20 ounce carbohydrate drink)    Clear liquids are liquids you can see through.  Nothing red in color.     Plain water                               Sports drinks  Sodas                                   Gelatin (Jell-O)  Fruit juices without pulp such as white grape juice and apple juice  Popsicles that contain no fruit or yogurt  Tea or coffee (no cream or milk added)  Gatorade / Powerade  G2 / Powerade Zero    • Infants may have breast milk up to four hours before surgery.  • Infants drinking formula may drink formula up to six hours before surgery.   • Patients who avoid smoking, chewing tobacco and alcohol for 4 weeks prior to surgery have a reduced risk of post-operative complications.  Quit smoking as many days before surgery as you can.  • Do not smoke, use chewing tobacco or drink alcohol the day of surgery.   • If applicable bring your C-PAP/ BI-PAP machine.  • Bring any papers given to you in the doctor’s office.  • Wear clean comfortable clothes.  • Do not wear contact lenses, false eyelashes or make-up.  Bring a case for your glasses.   • Bring crutches or walker if applicable.  • Remove all piercings.  Leave jewelry and any  other valuables at home.  • Hair extensions with metal clips must be removed prior to surgery.  • The Pre-Admission Testing nurse will instruct you to bring medications if unable to obtain an accurate list in Pre-Admission Testing.            Preventing a Surgical Site Infection:  • For 2 to 3 days before surgery, avoid shaving with a razor because the razor can irritate skin and make it easier to develop an infection.    • Any areas of open skin can increase the risk of a post-operative wound infection by allowing bacteria to enter and travel throughout the body.  Notify your surgeon if you have any skin wounds / rashes even if it is not near the expected surgical site.  The area will need assessed to determine if surgery should be delayed until it is healed.  • The night prior to surgery shower using a fresh bar of anti-bacterial soap (such as Dial) and clean washcloth.  Sleep in a clean bed with clean clothing.  Do not allow pets to sleep with you.  • Shower on the morning of surgery using a fresh bar of anti-bacterial soap (such as Dial) and clean washcloth.  Dry with a clean towel and dress in clean clothing.  • Ask your surgeon if you will be receiving antibiotics prior to surgery.  • Make sure you, your family, and all healthcare providers clean their hands with soap and water or an alcohol based hand  before caring for you or your wound.    Day of surgery:  Your arrival time is approximately two hours before your scheduled surgery time.  Upon arrival, a Pre-op nurse and Anesthesiologist will review your health history, obtain vital signs, and answer questions you may have.  The only belongings needed at this time will be a list of your home medications and if applicable your C-PAP/BI-PAP machine.  A Pre-op nurse will start an IV and you may receive medication in preparation for surgery, including something to help you relax.     Please be aware that surgery does come with discomfort.  We want to  make every effort to control your discomfort so please discuss any uncontrolled symptoms with your nurse.   Your doctor will most likely have prescribed pain medications.      If you are going home after surgery you will receive individualized written care instructions before being discharged.  A responsible adult must drive you to and from the hospital on the day of your surgery and stay with you for 24 hours.  Discharge prescriptions can be filled by the hospital pharmacy during regular pharmacy hours.  If you are having surgery late in the day/evening your prescription may be e-prescribed to your pharmacy.  Please verify your pharmacy hours or chose a 24 hour pharmacy to avoid not having access to your prescription because your pharmacy has closed for the day.    If you are staying overnight following surgery, you will be transported to your hospital room following the recovery period.  [facility] has all private rooms.    If you have any questions please call Pre-Admission Testing at [phone].  Deductibles and co-payments are collected on the day of service. Please be prepared to pay the required co-pay, deductible or deposit on the day of service as defined by your plan.    Patient Education for Self-Quarantine Process    • Following your COVID testing, we strongly recommend that you wear a mask when you are with other people and practice social distancing.   • Limit your activities to only required outings.  • Wash your hands with soap and water frequently for at least 20 seconds.   • Avoid touching your eyes, nose and mouth with unwashed hands.  • Do not share anything - utensils, drinking glasses, food from the same bowl.   • Sanitize household surfaces daily. Include all high touch areas (door handles, light switches, phones, countertops, etc.)    Call your surgeon immediately if you experience any of the following symptoms:  • Sore Throat  • Shortness of Breath or difficulty  breathing  • Cough  • Chills  • Body soreness or muscle pain  • Headache  • Fever  • New loss of taste or smell  • Do not arrive for your surgery ill.  Your procedure will need to be rescheduled to another time.  You will need to call your physician before the day of surgery to avoid any unnecessary exposure to hospital staff as well as other patients.    CHLORHEXIDINE CLOTH INSTRUCTIONS  The morning of surgery follow these instructions using the Chlorhexidine cloths you've been given.  These steps reduce bacteria on the body.  Do not use the cloths near your eyes, ears mouth, genitalia or on open wounds.  Throw the cloths away after use but do not try to flush them down a toilet.      • Open and remove one cloth at a time from the package.    • Leave the cloth unfolded and begin the bathing.  • Massage the skin with the cloths using gentle pressure to remove bacteria.  Do not scrub harshly.   • Follow the steps below with one 2% CHG cloth per area (6 total cloths).  • One cloth for neck, shoulders and chest.  • One cloth for both arms, hands, fingers and underarms (do underarms last).  • One cloth for the abdomen followed by groin.  • One cloth for right leg and foot including between the toes.  • One cloth for left leg and foot including between the toes.  • The last cloth is to be used for the back of the neck, back and buttocks.    Allow the CHG to air dry 3 minutes on the skin which will give it time to work and decrease the chance of irritation.  The skin may feel sticky until it is dry.  Do not rinse with water or any other liquid or you will lose the beneficial effects of the CHG.  If mild skin irritation occurs, do rinse the skin to remove the CHG.  Report this to the nurse at time of admission.  Do not apply lotions, creams, ointments, deodorants or perfumes after using the clothes. Dress in clean clothes before coming to the hospital.

## 2022-01-25 ENCOUNTER — HOSPITAL ENCOUNTER (OUTPATIENT)
Facility: HOSPITAL | Age: 82
Setting detail: HOSPITAL OUTPATIENT SURGERY
Discharge: HOME | End: 2022-01-25
Attending: SURGERY | Admitting: SURGERY

## 2022-01-25 ENCOUNTER — ANESTHESIA EVENT (OUTPATIENT)
Dept: PERIOP | Facility: HOSPITAL | Age: 82
End: 2022-01-25

## 2022-01-25 ENCOUNTER — APPOINTMENT (OUTPATIENT)
Dept: GENERAL RADIOLOGY | Facility: HOSPITAL | Age: 82
End: 2022-01-25

## 2022-01-25 ENCOUNTER — ANESTHESIA (OUTPATIENT)
Dept: PERIOP | Facility: HOSPITAL | Age: 82
End: 2022-01-25

## 2022-01-25 VITALS
RESPIRATION RATE: 18 BRPM | DIASTOLIC BLOOD PRESSURE: 91 MMHG | WEIGHT: 203.3 LBS | SYSTOLIC BLOOD PRESSURE: 166 MMHG | BODY MASS INDEX: 29.11 KG/M2 | HEART RATE: 79 BPM | TEMPERATURE: 98.6 F | HEIGHT: 70 IN | OXYGEN SATURATION: 100 %

## 2022-01-25 DIAGNOSIS — I70.245 ATHEROSCLEROSIS OF NATIVE ARTERIES OF LEFT LEG WITH ULCERATION OF OTHER PART OF FOOT: Primary | ICD-10-CM

## 2022-01-25 PROBLEM — I70.262 ATHEROSCLEROSIS OF NATIVE ARTERY OF LEFT LOWER EXTREMITY WITH GANGRENE: Status: ACTIVE | Noted: 2022-01-25

## 2022-01-25 PROCEDURE — C1760 CLOSURE DEV, VASC: HCPCS | Performed by: SURGERY

## 2022-01-25 PROCEDURE — 0 IOPAMIDOL PER 1 ML: Performed by: SURGERY

## 2022-01-25 PROCEDURE — 25010000002 MIDAZOLAM PER 1 MG: Performed by: ANESTHESIOLOGY

## 2022-01-25 PROCEDURE — 25010000002 HEPARIN (PORCINE) PER 1000 UNITS: Performed by: SURGERY

## 2022-01-25 PROCEDURE — C1725 CATH, TRANSLUMIN NON-LASER: HCPCS | Performed by: SURGERY

## 2022-01-25 PROCEDURE — C1894 INTRO/SHEATH, NON-LASER: HCPCS | Performed by: SURGERY

## 2022-01-25 PROCEDURE — C1876 STENT, NON-COA/NON-COV W/DEL: HCPCS | Performed by: SURGERY

## 2022-01-25 PROCEDURE — 25010000002 PROPOFOL 10 MG/ML EMULSION: Performed by: ANESTHESIOLOGY

## 2022-01-25 PROCEDURE — C1887 CATHETER, GUIDING: HCPCS | Performed by: SURGERY

## 2022-01-25 PROCEDURE — 25010000002 HYDROMORPHONE PER 4 MG: Performed by: ANESTHESIOLOGY

## 2022-01-25 PROCEDURE — 25010000002 HEPARIN (PORCINE) PER 1000 UNITS: Performed by: ANESTHESIOLOGY

## 2022-01-25 PROCEDURE — C1769 GUIDE WIRE: HCPCS | Performed by: SURGERY

## 2022-01-25 PROCEDURE — C1724 CATH, TRANS ATHEREC,ROTATION: HCPCS | Performed by: SURGERY

## 2022-01-25 PROCEDURE — C1773 RET DEV, INSERTABLE: HCPCS | Performed by: SURGERY

## 2022-01-25 PROCEDURE — 0 LIDOCAINE 1 % SOLUTION 20 ML VIAL: Performed by: SURGERY

## 2022-01-25 PROCEDURE — 85347 COAGULATION TIME ACTIVATED: CPT

## 2022-01-25 PROCEDURE — 0 CEFAZOLIN IN DEXTROSE 2-4 GM/100ML-% SOLUTION: Performed by: SURGERY

## 2022-01-25 DEVICE — STENT PRB35-06-200-120 PROTEGE EF V07
Type: IMPLANTABLE DEVICE | Site: LEG | Status: FUNCTIONAL
Brand: EVERFLEX™ PROTÉGÉ™ EVERFLEX™

## 2022-01-25 DEVICE — IMPLANTABLE DEVICE: Type: IMPLANTABLE DEVICE | Site: LEG | Status: FUNCTIONAL

## 2022-01-25 RX ORDER — PROPOFOL 10 MG/ML
VIAL (ML) INTRAVENOUS AS NEEDED
Status: DISCONTINUED | OUTPATIENT
Start: 2022-01-25 | End: 2022-01-25 | Stop reason: SURG

## 2022-01-25 RX ORDER — SODIUM CHLORIDE 0.9 % (FLUSH) 0.9 %
3 SYRINGE (ML) INJECTION EVERY 12 HOURS SCHEDULED
Status: DISCONTINUED | OUTPATIENT
Start: 2022-01-25 | End: 2022-01-25 | Stop reason: HOSPADM

## 2022-01-25 RX ORDER — PROPOFOL 10 MG/ML
VIAL (ML) INTRAVENOUS CONTINUOUS PRN
Status: DISCONTINUED | OUTPATIENT
Start: 2022-01-25 | End: 2022-01-25 | Stop reason: SURG

## 2022-01-25 RX ORDER — FENTANYL CITRATE 50 UG/ML
50 INJECTION, SOLUTION INTRAMUSCULAR; INTRAVENOUS
Status: DISCONTINUED | OUTPATIENT
Start: 2022-01-25 | End: 2022-01-25 | Stop reason: HOSPADM

## 2022-01-25 RX ORDER — HYDROMORPHONE HYDROCHLORIDE 1 MG/ML
0.5 INJECTION, SOLUTION INTRAMUSCULAR; INTRAVENOUS; SUBCUTANEOUS
Status: DISCONTINUED | OUTPATIENT
Start: 2022-01-25 | End: 2022-01-25 | Stop reason: HOSPADM

## 2022-01-25 RX ORDER — KETAMINE HYDROCHLORIDE 10 MG/ML
INJECTION INTRAMUSCULAR; INTRAVENOUS AS NEEDED
Status: DISCONTINUED | OUTPATIENT
Start: 2022-01-25 | End: 2022-01-25 | Stop reason: SURG

## 2022-01-25 RX ORDER — SODIUM CHLORIDE 9 MG/ML
INJECTION, SOLUTION INTRAVENOUS CONTINUOUS PRN
Status: DISCONTINUED | OUTPATIENT
Start: 2022-01-25 | End: 2022-01-25 | Stop reason: SURG

## 2022-01-25 RX ORDER — SODIUM CHLORIDE 0.9 % (FLUSH) 0.9 %
3-10 SYRINGE (ML) INJECTION AS NEEDED
Status: DISCONTINUED | OUTPATIENT
Start: 2022-01-25 | End: 2022-01-25 | Stop reason: HOSPADM

## 2022-01-25 RX ORDER — FLUMAZENIL 0.1 MG/ML
0.2 INJECTION INTRAVENOUS AS NEEDED
Status: DISCONTINUED | OUTPATIENT
Start: 2022-01-25 | End: 2022-01-25 | Stop reason: HOSPADM

## 2022-01-25 RX ORDER — CEFAZOLIN SODIUM 2 G/100ML
2 INJECTION, SOLUTION INTRAVENOUS ONCE
Status: COMPLETED | OUTPATIENT
Start: 2022-01-25 | End: 2022-01-25

## 2022-01-25 RX ORDER — CLOPIDOGREL BISULFATE 75 MG/1
300 TABLET ORAL ONCE
Status: COMPLETED | OUTPATIENT
Start: 2022-01-25 | End: 2022-01-25

## 2022-01-25 RX ORDER — HYDROCODONE BITARTRATE AND ACETAMINOPHEN 5; 325 MG/1; MG/1
2 TABLET ORAL EVERY 6 HOURS PRN
Qty: 10 TABLET | Refills: 0 | Status: ON HOLD | OUTPATIENT
Start: 2022-01-25 | End: 2022-06-28

## 2022-01-25 RX ORDER — ONDANSETRON 2 MG/ML
4 INJECTION INTRAMUSCULAR; INTRAVENOUS ONCE AS NEEDED
Status: DISCONTINUED | OUTPATIENT
Start: 2022-01-25 | End: 2022-01-25 | Stop reason: HOSPADM

## 2022-01-25 RX ORDER — OXYCODONE AND ACETAMINOPHEN 7.5; 325 MG/1; MG/1
1 TABLET ORAL EVERY 4 HOURS PRN
Status: DISCONTINUED | OUTPATIENT
Start: 2022-01-25 | End: 2022-01-25 | Stop reason: HOSPADM

## 2022-01-25 RX ORDER — EPHEDRINE SULFATE 50 MG/ML
5 INJECTION, SOLUTION INTRAVENOUS ONCE AS NEEDED
Status: DISCONTINUED | OUTPATIENT
Start: 2022-01-25 | End: 2022-01-25 | Stop reason: HOSPADM

## 2022-01-25 RX ORDER — DIPHENHYDRAMINE HYDROCHLORIDE 50 MG/ML
12.5 INJECTION INTRAMUSCULAR; INTRAVENOUS
Status: DISCONTINUED | OUTPATIENT
Start: 2022-01-25 | End: 2022-01-25 | Stop reason: HOSPADM

## 2022-01-25 RX ORDER — LABETALOL HYDROCHLORIDE 5 MG/ML
5 INJECTION, SOLUTION INTRAVENOUS
Status: DISCONTINUED | OUTPATIENT
Start: 2022-01-25 | End: 2022-01-25 | Stop reason: HOSPADM

## 2022-01-25 RX ORDER — ASPIRIN 81 MG/1
81 TABLET ORAL DAILY
Start: 2022-01-25

## 2022-01-25 RX ORDER — SODIUM CHLORIDE, SODIUM LACTATE, POTASSIUM CHLORIDE, CALCIUM CHLORIDE 600; 310; 30; 20 MG/100ML; MG/100ML; MG/100ML; MG/100ML
9 INJECTION, SOLUTION INTRAVENOUS CONTINUOUS
Status: DISCONTINUED | OUTPATIENT
Start: 2022-01-25 | End: 2022-01-25 | Stop reason: HOSPADM

## 2022-01-25 RX ORDER — NALOXONE HCL 0.4 MG/ML
0.2 VIAL (ML) INJECTION AS NEEDED
Status: DISCONTINUED | OUTPATIENT
Start: 2022-01-25 | End: 2022-01-25 | Stop reason: HOSPADM

## 2022-01-25 RX ORDER — IBUPROFEN 600 MG/1
600 TABLET ORAL ONCE AS NEEDED
Status: DISCONTINUED | OUTPATIENT
Start: 2022-01-25 | End: 2022-01-25 | Stop reason: HOSPADM

## 2022-01-25 RX ORDER — CLOPIDOGREL BISULFATE 75 MG/1
75 TABLET ORAL DAILY
Qty: 30 TABLET | Refills: 3 | Status: SHIPPED | OUTPATIENT
Start: 2022-01-25

## 2022-01-25 RX ORDER — HYDROMORPHONE HCL 110MG/55ML
PATIENT CONTROLLED ANALGESIA SYRINGE INTRAVENOUS AS NEEDED
Status: DISCONTINUED | OUTPATIENT
Start: 2022-01-25 | End: 2022-01-25 | Stop reason: SURG

## 2022-01-25 RX ORDER — HYDROCODONE BITARTRATE AND ACETAMINOPHEN 7.5; 325 MG/1; MG/1
1 TABLET ORAL ONCE AS NEEDED
Status: DISCONTINUED | OUTPATIENT
Start: 2022-01-25 | End: 2022-01-25 | Stop reason: HOSPADM

## 2022-01-25 RX ORDER — MIDAZOLAM HYDROCHLORIDE 1 MG/ML
0.5 INJECTION INTRAMUSCULAR; INTRAVENOUS
Status: COMPLETED | OUTPATIENT
Start: 2022-01-25 | End: 2022-01-25

## 2022-01-25 RX ORDER — PROMETHAZINE HYDROCHLORIDE 25 MG/1
25 SUPPOSITORY RECTAL ONCE AS NEEDED
Status: DISCONTINUED | OUTPATIENT
Start: 2022-01-25 | End: 2022-01-25 | Stop reason: HOSPADM

## 2022-01-25 RX ORDER — VENLAFAXINE 75 MG/1
75 TABLET ORAL NIGHTLY
COMMUNITY
End: 2022-06-27

## 2022-01-25 RX ORDER — LIDOCAINE HYDROCHLORIDE 10 MG/ML
0.5 INJECTION, SOLUTION EPIDURAL; INFILTRATION; INTRACAUDAL; PERINEURAL ONCE AS NEEDED
Status: DISCONTINUED | OUTPATIENT
Start: 2022-01-25 | End: 2022-01-25 | Stop reason: HOSPADM

## 2022-01-25 RX ORDER — HEPARIN SODIUM 1000 [USP'U]/ML
INJECTION, SOLUTION INTRAVENOUS; SUBCUTANEOUS AS NEEDED
Status: DISCONTINUED | OUTPATIENT
Start: 2022-01-25 | End: 2022-01-25 | Stop reason: SURG

## 2022-01-25 RX ORDER — PROMETHAZINE HYDROCHLORIDE 25 MG/1
25 TABLET ORAL ONCE AS NEEDED
Status: DISCONTINUED | OUTPATIENT
Start: 2022-01-25 | End: 2022-01-25 | Stop reason: HOSPADM

## 2022-01-25 RX ORDER — FAMOTIDINE 10 MG/ML
20 INJECTION, SOLUTION INTRAVENOUS ONCE
Status: COMPLETED | OUTPATIENT
Start: 2022-01-25 | End: 2022-01-25

## 2022-01-25 RX ORDER — GEMFIBROZIL 600 MG/1
600 TABLET, FILM COATED ORAL
COMMUNITY
End: 2022-08-03

## 2022-01-25 RX ORDER — HYDRALAZINE HYDROCHLORIDE 20 MG/ML
5 INJECTION INTRAMUSCULAR; INTRAVENOUS
Status: DISCONTINUED | OUTPATIENT
Start: 2022-01-25 | End: 2022-01-25 | Stop reason: HOSPADM

## 2022-01-25 RX ORDER — DIPHENHYDRAMINE HCL 25 MG
25 CAPSULE ORAL
Status: DISCONTINUED | OUTPATIENT
Start: 2022-01-25 | End: 2022-01-25 | Stop reason: HOSPADM

## 2022-01-25 RX ORDER — MIDAZOLAM HYDROCHLORIDE 1 MG/ML
INJECTION INTRAMUSCULAR; INTRAVENOUS AS NEEDED
Status: DISCONTINUED | OUTPATIENT
Start: 2022-01-25 | End: 2022-01-25 | Stop reason: SURG

## 2022-01-25 RX ADMIN — CLOPIDOGREL 300 MG: 75 TABLET, FILM COATED ORAL at 16:12

## 2022-01-25 RX ADMIN — HEPARIN SODIUM 3000 UNITS: 1000 INJECTION INTRAVENOUS; SUBCUTANEOUS at 10:44

## 2022-01-25 RX ADMIN — MIDAZOLAM 0.5 MG: 1 INJECTION INTRAMUSCULAR; INTRAVENOUS at 10:01

## 2022-01-25 RX ADMIN — HEPARIN SODIUM 5000 UNITS: 1000 INJECTION INTRAVENOUS; SUBCUTANEOUS at 11:22

## 2022-01-25 RX ADMIN — FAMOTIDINE 20 MG: 10 INJECTION, SOLUTION INTRAVENOUS at 09:51

## 2022-01-25 RX ADMIN — CEFAZOLIN SODIUM 2 G: 2 INJECTION, SOLUTION INTRAVENOUS at 10:07

## 2022-01-25 RX ADMIN — MIDAZOLAM 0.5 MG: 1 INJECTION INTRAMUSCULAR; INTRAVENOUS at 09:51

## 2022-01-25 RX ADMIN — HYDROMORPHONE HYDROCHLORIDE 0.5 MG: 2 INJECTION, SOLUTION INTRAMUSCULAR; INTRAVENOUS; SUBCUTANEOUS at 13:12

## 2022-01-25 RX ADMIN — SODIUM CHLORIDE, POTASSIUM CHLORIDE, SODIUM LACTATE AND CALCIUM CHLORIDE 9 ML/HR: 600; 310; 30; 20 INJECTION, SOLUTION INTRAVENOUS at 09:12

## 2022-01-25 RX ADMIN — MIDAZOLAM 2 MG: 1 INJECTION INTRAMUSCULAR; INTRAVENOUS at 10:14

## 2022-01-25 RX ADMIN — KETAMINE HYDROCHLORIDE 20 MG: 10 INJECTION INTRAMUSCULAR; INTRAVENOUS at 10:21

## 2022-01-25 RX ADMIN — HEPARIN SODIUM 2500 UNITS: 1000 INJECTION INTRAVENOUS; SUBCUTANEOUS at 11:55

## 2022-01-25 RX ADMIN — HEPARIN SODIUM 2000 UNITS: 1000 INJECTION INTRAVENOUS; SUBCUTANEOUS at 11:09

## 2022-01-25 RX ADMIN — CEFAZOLIN SODIUM 2 G: 2 INJECTION, SOLUTION INTRAVENOUS at 14:07

## 2022-01-25 RX ADMIN — HYDROMORPHONE HYDROCHLORIDE 0.5 MG: 2 INJECTION, SOLUTION INTRAMUSCULAR; INTRAVENOUS; SUBCUTANEOUS at 10:12

## 2022-01-25 RX ADMIN — SODIUM CHLORIDE: 9 INJECTION, SOLUTION INTRAVENOUS at 13:46

## 2022-01-25 RX ADMIN — IOPAMIDOL 358 ML: 510 INJECTION, SOLUTION INTRAVASCULAR at 14:31

## 2022-01-25 RX ADMIN — PROPOFOL 75 MCG/KG/MIN: 10 INJECTION, EMULSION INTRAVENOUS at 10:17

## 2022-01-25 RX ADMIN — Medication 50 MG: at 10:18

## 2022-01-25 RX ADMIN — HEPARIN SODIUM 2500 UNITS: 1000 INJECTION INTRAVENOUS; SUBCUTANEOUS at 13:13

## 2022-01-25 RX ADMIN — SODIUM CHLORIDE, POTASSIUM CHLORIDE, SODIUM LACTATE AND CALCIUM CHLORIDE: 600; 310; 30; 20 INJECTION, SOLUTION INTRAVENOUS at 10:44

## 2022-01-25 NOTE — ANESTHESIA PREPROCEDURE EVALUATION
Anesthesia Evaluation                  Airway   Mallampati: II  TM distance: >3 FB  Neck ROM: full  No difficulty expected  Dental          Pulmonary - normal exam   Cardiovascular - normal exam    Patient on routine beta blocker and Beta blocker given within 24 hours of surgery    (+) hypertension 2 medications or greater, valvular problems/murmurs AS, PVD, hyperlipidemia,     ROS comment: Pt states he hasnt been in the hospital.    Neuro/Psych  (+) numbness, psychiatric history,     GI/Hepatic/Renal/Endo      Musculoskeletal     Abdominal    Substance History      OB/GYN          Other   arthritis,                      Anesthesia Plan    ASA 4     MAC       Anesthetic plan, all risks, benefits, and alternatives have been provided, discussed and informed consent has been obtained with: patient.        CODE STATUS:

## 2022-01-25 NOTE — PERIOPERATIVE NURSING NOTE
Dr. Burger at bedside, notified unable to obtain DP or PT pulses on left leg. No new orders noted.

## 2022-01-25 NOTE — PERIOPERATIVE NURSING NOTE
Dr. Ruffin notified pt has not had stress test or Echo as per cardiologists note, states he will discuss with Dr. Bernal and Dr. Burger in the room.

## 2022-01-25 NOTE — ANESTHESIA POSTPROCEDURE EVALUATION
"Patient: Sohan De La Torre    Procedure Summary     Date: 01/25/22 Room / Location[facility]    Anesthesia Start: 1011 Anesthesia Stop: 1431    Procedure: AORTO LIAC  FEMORAL LEFT POPLITEAL AND ANTERIOR TIBIAL LASER  ATHERECTOMY AND ANGIOPLASTY. LEFT FEMORAL AND POPLITEAL ARTERY STENT PLACEMENT (Left ) Diagnosis:     Surgeons: Sohan Burger MD Provider: Guido Cobian MD    Anesthesia Type: MAC ASA Status: 4          Anesthesia Type: MAC    Vitals  Vitals Value Taken Time   /72 01/25/22 1516   Temp 37 °C (98.6 °F) 01/25/22 1425   Pulse 72 01/25/22 1530   Resp 16 01/25/22 1515   SpO2 96 % 01/25/22 1530   Vitals shown include unvalidated device data.        Post Anesthesia Care and Evaluation    Patient location during evaluation: bedside  Patient participation: complete - patient participated  Level of consciousness: awake  Pain score: 2  Pain management: adequate  Airway patency: patent  Anesthetic complications: No anesthetic complications  PONV Status: none  Cardiovascular status: acceptable  Respiratory status: acceptable  Hydration status: acceptable    Comments: /72   Pulse 72   Temp 37 °C (98.6 °F) (Oral)   Resp 16   Ht 177.8 cm (70\")   Wt 92.2 kg (203 lb 4.8 oz)   SpO2 95%   BMI 29.17 kg/m²         "

## 2022-01-25 NOTE — PERIOPERATIVE NURSING NOTE
Dr. Ruffin states he spoke with Dr. Burger who states that stress test and Echo workup are needed before his next surgery, ok to proceed with today's procedure as planned.

## 2022-01-25 NOTE — DISCHARGE INSTRUCTIONS
[facility]  Fredo Joe, Tao, Vivian Mckeon, Akhil Joyce Vinyard  [address]  [place] [address]  [phone]      Discharge Instructions for Angiogram    1. Go home, rest and take it easy today.       2. You may experience some dizziness or memory loss from the anesthesia.  This may last for the next 24 hours.  Someone should plan on staying with you for the first 24 hours for your safety.     3. Do not make any important legal decisions or sign any legal papers for the next 24 hours.       4. Eat and drink lightly today.  Start off with liquids, jello, soup, crackers or other bland foods at first. You may advance your diet tomorrow as tolerated as long as you do not experience any nausea or vomiting.     5. You may resume routine medications including blood thinners. However, Glucophage should be not be started for 72 hours after the dye is given.       6. No lifting over 10 pounds and no strenuous activity for the next 2-3 days.     7. Try not to strain or bear down when your bowels move or when you empty your bladder.     8. Limit going up and down steps for 2 days.     9. No driving for the remainder of the day.  You may resume limited driving tomorrow if necessary.      10.  You may shower tomorrow.  No bath or hot tubs for at least 2 days after the procedure.           11. Leave the pressure dressing on until tomorrow morning.  You may then take this off, as well as the small see through dressing with gauze tomorrow.  If it doesn’t come off easily, do not pull this off.  If it is stuck, shower and let the warm water loosen it before removal.        12. Check your groin dressing regularly for bleeding through the dressing (under the pressure dressing).  A small amount of blood contained by the gauze is normal; the whole dressing should not be filled with blood or leaking out under the sides.      13. If you experience bleeding through the gauze/clear sticky  dressing, lay flat and have someone apply direct pressure for 15 minutes.  If bleeding has stopped after this, you may put a clean gauze and tape over the area.  Continue to lie flat for 1-2 hours.  If bleeding continues after 15 minutes of pressure, call us at the number listed above.  There is an MD available after hours.       14. If you experience heavy bleeding or large swelling, continue to hold firm pressure and               call 911.  Do not call the MD on call.      15.  If you experience pain or discomfort you may take Tylenol or Ibuprofen, whichever you normally use for minor discomfort, unless otherwise instructed.         16.  If the MD gives you a prescription for narcotic pain pills (Tylenol #3, Vicodin, Hydrocodone, Oxycodone or Percocet), you cannot drive a vehicle or operate machinery while taking these.     17.  Severe pain is not expected after this procedure.  If you experience severe pain, please call our office at [phone].     18.  Remember to contact our office for any of the following:    • Fever > 101 degrees  • Severe pain that cannot be controlled by taking your pain pills  • Severe nausea or vomiting   • Significant bleeding of your incisions  • Drainage that has a bad smell or is yellow or green in appearance  • Any other questions or concerns

## 2022-01-26 NOTE — OP NOTE
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    Post-Op Diagnosis Codes:   same    Procedure:   1) duplex directed access to the right common femoral artery and to the left dorsalis pedis artery, aortoiliofemoral arteriogram with left lower extremity runoff, left anterior tibial and popliteal artery laser atherectomy, left anterior tibial artery popliteal artery and superficial femoral artery angioplasty, left anterior tibial artery stent placement, left superficial femoral artery and popliteal artery stent placement x2, snare capture of wire at mid SFA, selective catheterization fourth order right to left anterior tibial segment    Surgeon: Marlo Burger MD    Assistant: Kari Luo RN and they provided critical assistance during the case including suctioning, exposure, retraction, and reduction of blood loss.    Anesthesia: Monitored Anesthesia Care    Staff:   Circulator: Caron Cooney RN; Constanza Evangelista RN  Laser Staff: Avelino Mccormick  Scrub Person: Lance Prieto Raymond  Assistant: Kari Luo  Vascular Radiology Technician: Aydee Spann Joseph    Estimated Blood Loss: minimal    Specimens: * No orders in the log *     Complications: None    Findings: See dictation    Indications:    The patient is an 81 y.o. male seen for evaluation left lower extremity ischemia with gangrene.  Patient is undergoing attempted revascularization understands risk benefits and complications agrees to procedure.       Procedure:    Patient was prepped and draped sterilely under MAC sedation.  Access to the right common femoral artery and Duker duplex direction allows aortogram aortoiliofemoral arteriogram and crossing over the horn to the left leg runoff left leg runoff.  Attempts to cross total occlusion from the mid SFA through the entirety of the popliteal and the tibioperoneal trunk origins and  anterior tibial origins is performed we are able to get all the way to the mid popliteal infrageniculate David and then no further.  Duplex directed access to the dorsalis pedis on the left allowed passage of a wire retrograde and then this allowed us to cross the occluded proximal anterior tibial followed by the occluded popliteal and and with snare capture connect the 2 wire systems with a through and through wire 014 based from the right groin through over the horn and out the left dorsalis pedis.  Over this rail we then laser atherectomized the anterior tibial artery and popliteal artery with a 1.4 mm laser.  We are unable to pass this laser through the SFA.  Following this a 2.5 mm x 150 mm balloon was used to angioplasty the anterior tibial artery throughout its entire segment and the below-knee popliteal and above-knee popliteal.  The infrageniculate popliteal above-knee popliteal and SFA were then angioplastied with a 5 mm x 100 mm balloon.  With flow established through all the segments or multiple dissection segments and two 4.5 mm x 6 mm intact stents were deployed in the anterior tibial artery for dissection.  The entirety of the distal SFA through the above-knee infrageniculate popliteal segments were aligned with 6 mm x 200 mm stents.  The patient was noted to have excellent flow after repeat angioplasty of the stented segments allowed for wide patency.  There is good signal at the anterior tibial which was triphasic.  The peroneal artery at the mid calf is still widely patent via collateralization which is not significantly changed during the procedure.  This segment if bypass is needed is approximately 24 cm above the medial malleolus for bypass target.  With completion of the procedure minx closure device was used in the right groin for 6 [place] closure and pressure was held at the foot.  We did not reverse the heparin effect.  Patient was fully heparinized throughout the course of the procedure.  No  complicating features were seen at the completion of the procedure.    Radiographic Findings:  Angiographic findings from the procedure include wide patency to the infrarenal aorta which has a small fusiform abdominal aortic aneurysm below bilateral widely patent renal arteries.  Aortic bifurcation is fairly tight but widely patent with some calcification throughout the iliac systems but otherwise widely patent common iliac external iliac and hypogastric arteries bilaterally.  Both common femoral arteries are widely patent both profunda femoris artery origins are widely patent.  The left SFA origins patent down through the mid segment of the SFA where it occludes acutely with a large collateral coming off that side.  The right SFA is occluded at its origin is not reconstituted in the visualized segment.  Left SFA runoff is not visualized and left profunda femoris arteries completely occluded throughout its entire course including infrageniculate and below-knee segments.  There is reconstitution of the peroneal artery at the mid calf off a large collateral this is widely patent to the ankle with a large collateral fills the dorsalis pedis and then retrograde fills the anterior tibial to the mid calf with multiple segments of stenosis.  Anterior tibial artery is occluded at its origin and reconstitutes via collateral with some disease segment throughout the mid calf.  The target vessel at the peroneal artery is approximately 24 cm above the right medial malleolus as marked on the arteriogram site.  Subsequent intervention via the dorsalis pedis access site and the over the horn intervention from the left side allows passage of multiple wires in a through and through manner to allow snare capture at the mid SFA.  With the through and through wire access multiple interventions including anterior tibial and popliteal laser atherectomy with a 1.4 mm [id number] laser is performed.  Subsequent balloon angioplasty with a  2.5 mm balloon throughout the segment of the anterior tibial and below-knee popliteal is performed.  5 mm balloon angioplasty of the popliteal and SFA segments is performed.  Recanalization is noted with marked dissection throughout all segments.  Dissections within the anterior tibial are treated with intact stents 4.5 mm segments x2.  Dissection within the SFA and above-knee popliteal and infrageniculate popliteal treated with 6 mm x 200 mm EV 3 stents x2.  Postdilatation is 6 mm at this segment are performed.  Postdilatation at the anterior tibial segments with a 3.5 mm balloon are performed.  Completion there is wide patency of the residual SFA and popliteal segments down through the anterior tibial runoff with no significant flow-limiting lesions remaining.      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      Sohan Burger MD     Date: 1/25/2022  Time: 20:35 EST

## 2022-01-28 LAB — ACT BLD: 315 SECONDS (ref 82–152)

## 2022-02-03 ENCOUNTER — HOSPITAL ENCOUNTER (OUTPATIENT)
Dept: CARDIOLOGY | Facility: HOSPITAL | Age: 82
Discharge: HOME | End: 2022-02-03

## 2022-02-03 VITALS
HEIGHT: 70 IN | HEART RATE: 91 BPM | DIASTOLIC BLOOD PRESSURE: 80 MMHG | OXYGEN SATURATION: 92 % | WEIGHT: 203 LBS | SYSTOLIC BLOOD PRESSURE: 140 MMHG | BODY MASS INDEX: 29.06 KG/M2

## 2022-02-03 DIAGNOSIS — R06.02 EXERTIONAL SHORTNESS OF BREATH: ICD-10-CM

## 2022-02-03 LAB
AORTIC ARCH: 2.4 CM
AORTIC DIMENSIONLESS INDEX: 0.5 (DI)
ASCENDING AORTA: 3.4 CM
AV HCM GRAD VALS: 37 MMHG
AV LVOT PEAK GRADIENT: 9 MMHG
BH CV ECHO MEAS - ACS: 1.1 CM
BH CV ECHO MEAS - AO ARCH DIAM (PROXIMAL TRANS.): 1.8 CM
BH CV ECHO MEAS - AO MAX PG (FULL): 26.6 MMHG
BH CV ECHO MEAS - AO MAX PG: 35 MMHG
BH CV ECHO MEAS - AO MEAN PG (FULL): 13.4 MMHG
BH CV ECHO MEAS - AO MEAN PG: 19 MMHG
BH CV ECHO MEAS - AO ROOT AREA (BSA CORRECTED): 1.5
BH CV ECHO MEAS - AO ROOT AREA: 8.3 CM^2
BH CV ECHO MEAS - AO ROOT DIAM: 3.3 CM
BH CV ECHO MEAS - AO V2 MAX: 297.4 CM/SEC
BH CV ECHO MEAS - AO V2 MEAN: 205.4 CM/SEC
BH CV ECHO MEAS - AO V2 VTI: 52.1 CM
BH CV ECHO MEAS - ASC AORTA: 3.4 CM
BH CV ECHO MEAS - AVA(I,A): 1.7 CM^2
BH CV ECHO MEAS - AVA(I,D): 1.7 CM^2
BH CV ECHO MEAS - AVA(V,A): 1.8 CM^2
BH CV ECHO MEAS - AVA(V,D): 1.8 CM^2
BH CV ECHO MEAS - BSA(HAYCOCK): 2.2 M^2
BH CV ECHO MEAS - BSA: 2.1 M^2
BH CV ECHO MEAS - BZI_BMI: 29.1 KILOGRAMS/M^2
BH CV ECHO MEAS - BZI_METRIC_HEIGHT: 177.8 CM
BH CV ECHO MEAS - BZI_METRIC_WEIGHT: 92.1 KG
BH CV ECHO MEAS - EDV(CUBED): 42.8 ML
BH CV ECHO MEAS - EDV(MOD-SP2): 66 ML
BH CV ECHO MEAS - EDV(MOD-SP4): 57 ML
BH CV ECHO MEAS - EDV(TEICH): 50.8 ML
BH CV ECHO MEAS - EF(CUBED): 77.7 %
BH CV ECHO MEAS - EF(MOD-BP): 74 %
BH CV ECHO MEAS - EF(MOD-SP2): 78.8 %
BH CV ECHO MEAS - EF(MOD-SP4): 70.2 %
BH CV ECHO MEAS - EF(TEICH): 70.9 %
BH CV ECHO MEAS - ESV(CUBED): 9.6 ML
BH CV ECHO MEAS - ESV(MOD-SP2): 14 ML
BH CV ECHO MEAS - ESV(MOD-SP4): 17 ML
BH CV ECHO MEAS - ESV(TEICH): 14.8 ML
BH CV ECHO MEAS - FS: 39.3 %
BH CV ECHO MEAS - IVS/LVPW: 1.1
BH CV ECHO MEAS - IVSD: 1.4 CM
BH CV ECHO MEAS - LAT PEAK E' VEL: 7.7 CM/SEC
BH CV ECHO MEAS - LV DIASTOLIC VOL/BSA (35-75): 27.1 ML/M^2
BH CV ECHO MEAS - LV MASS(C)D: 168.6 GRAMS
BH CV ECHO MEAS - LV MASS(C)DI: 80.3 GRAMS/M^2
BH CV ECHO MEAS - LV MAX PG: 8.8 MMHG
BH CV ECHO MEAS - LV MEAN PG: 5.9 MMHG
BH CV ECHO MEAS - LV SYSTOLIC VOL/BSA (12-30): 8.1 ML/M^2
BH CV ECHO MEAS - LV V1 MAX: 148.4 CM/SEC
BH CV ECHO MEAS - LV V1 MEAN: 115.6 CM/SEC
BH CV ECHO MEAS - LV V1 VTI: 25 CM
BH CV ECHO MEAS - LVIDD: 3.5 CM
BH CV ECHO MEAS - LVIDS: 2.1 CM
BH CV ECHO MEAS - LVLD AP2: 7.4 CM
BH CV ECHO MEAS - LVLD AP4: 7.1 CM
BH CV ECHO MEAS - LVLS AP2: 5.2 CM
BH CV ECHO MEAS - LVLS AP4: 6.5 CM
BH CV ECHO MEAS - LVOT AREA (M): 3.5 CM^2
BH CV ECHO MEAS - LVOT AREA: 3.6 CM^2
BH CV ECHO MEAS - LVOT DIAM: 2.1 CM
BH CV ECHO MEAS - LVPWD: 1.3 CM
BH CV ECHO MEAS - MED PEAK E' VEL: 7.3 CM/SEC
BH CV ECHO MEAS - MV A DUR: 0.12 SEC
BH CV ECHO MEAS - MV A MAX VEL: 164.4 CM/SEC
BH CV ECHO MEAS - MV DEC SLOPE: 320.1 CM/SEC^2
BH CV ECHO MEAS - MV DEC TIME: 0.35 SEC
BH CV ECHO MEAS - MV E MAX VEL: 106 CM/SEC
BH CV ECHO MEAS - MV E/A: 0.64
BH CV ECHO MEAS - MV MAX PG: 9 MMHG
BH CV ECHO MEAS - MV MEAN PG: 4 MMHG
BH CV ECHO MEAS - MV P1/2T MAX VEL: 100.4 CM/SEC
BH CV ECHO MEAS - MV P1/2T: 91.9 MSEC
BH CV ECHO MEAS - MV V2 MAX: 152 CM/SEC
BH CV ECHO MEAS - MV V2 MEAN: 96.6 CM/SEC
BH CV ECHO MEAS - MV V2 VTI: 38 CM
BH CV ECHO MEAS - MVA P1/2T LCG: 2.2 CM^2
BH CV ECHO MEAS - MVA(P1/2T): 2.4 CM^2
BH CV ECHO MEAS - MVA(VTI): 2.3 CM^2
BH CV ECHO MEAS - PA ACC TIME: 0.09 SEC
BH CV ECHO MEAS - PA MAX PG (FULL): 1.7 MMHG
BH CV ECHO MEAS - PA MAX PG: 3.4 MMHG
BH CV ECHO MEAS - PA PR(ACCEL): 37.4 MMHG
BH CV ECHO MEAS - PA V2 MAX: 92.3 CM/SEC
BH CV ECHO MEAS - PULM A REVS DUR: 0.08 SEC
BH CV ECHO MEAS - PULM A REVS VEL: 27.6 CM/SEC
BH CV ECHO MEAS - PULM DIAS VEL: 30.5 CM/SEC
BH CV ECHO MEAS - PULM S/D: 1.8
BH CV ECHO MEAS - PULM SYS VEL: 54.3 CM/SEC
BH CV ECHO MEAS - PVA(V,A): 3.7 CM^2
BH CV ECHO MEAS - PVA(V,D): 3.7 CM^2
BH CV ECHO MEAS - QP/QS: 0.77
BH CV ECHO MEAS - RAP SYSTOLE: 3 MMHG
BH CV ECHO MEAS - RV MAX PG: 1.7 MMHG
BH CV ECHO MEAS - RV MEAN PG: 1.2 MMHG
BH CV ECHO MEAS - RV V1 MAX: 65.6 CM/SEC
BH CV ECHO MEAS - RV V1 MEAN: 53.2 CM/SEC
BH CV ECHO MEAS - RV V1 VTI: 13.1 CM
BH CV ECHO MEAS - RVOT AREA: 5.2 CM^2
BH CV ECHO MEAS - RVOT DIAM: 2.6 CM
BH CV ECHO MEAS - RVSP: 27 MMHG
BH CV ECHO MEAS - SI(AO): 206.3 ML/M^2
BH CV ECHO MEAS - SI(CUBED): 15.8 ML/M^2
BH CV ECHO MEAS - SI(LVOT): 42.3 ML/M^2
BH CV ECHO MEAS - SI(MOD-SP2): 24.8 ML/M^2
BH CV ECHO MEAS - SI(MOD-SP4): 19 ML/M^2
BH CV ECHO MEAS - SI(TEICH): 17.1 ML/M^2
BH CV ECHO MEAS - SV(AO): 433.3 ML
BH CV ECHO MEAS - SV(CUBED): 33.3 ML
BH CV ECHO MEAS - SV(LVOT): 88.9 ML
BH CV ECHO MEAS - SV(MOD-SP2): 52 ML
BH CV ECHO MEAS - SV(MOD-SP4): 40 ML
BH CV ECHO MEAS - SV(RVOT): 68.6 ML
BH CV ECHO MEAS - SV(TEICH): 36 ML
BH CV ECHO MEAS - TAPSE (>1.6): 1.6 CM
BH CV ECHO MEAS - TR MAX VEL: 245.7 CM/SEC
BH CV ECHO MEASUREMENTS AVERAGE E/E' RATIO: 14.13
BH CV NUCLEAR PRIOR STUDY: 2
BH CV REST NUCLEAR ISOTOPE DOSE: 10.2 MCI
BH CV STRESS BP STAGE 1: NORMAL
BH CV STRESS COMMENTS STAGE 1: NORMAL
BH CV STRESS DOSE REGADENOSON STAGE 1: 0.4
BH CV STRESS DURATION MIN STAGE 1: 0
BH CV STRESS DURATION SEC STAGE 1: 10
BH CV STRESS HR STAGE 1: 112
BH CV STRESS NUCLEAR ISOTOPE DOSE: 34.3 MCI
BH CV STRESS PROTOCOL 1: NORMAL
BH CV STRESS RECOVERY BP: NORMAL MMHG
BH CV STRESS RECOVERY HR: 100 BPM
BH CV STRESS STAGE 1: 1
BH CV VAS BP RIGHT ARM: NORMAL MMHG
BH CV XLRA - RV BASE: 2.9 CM
BH CV XLRA - RV LENGTH: 6.9 CM
BH CV XLRA - RV MID: 3.1 CM
BH CV XLRA - TDI S': 10.1 CM/SEC
LEFT ATRIUM VOLUME INDEX: 30 ML/M2
LV EF NUC BP: 54 %
MAXIMAL PREDICTED HEART RATE: 140 BPM
MAXIMAL PREDICTED HEART RATE: 140 BPM
PERCENT MAX PREDICTED HR: 80 %
SINUS: 3.1 CM
STJ: 3.2 CM
STRESS BASELINE BP: NORMAL MMHG
STRESS BASELINE HR: 85 BPM
STRESS PERCENT HR: 94 %
STRESS POST EXERCISE DUR SEC: 10 SEC
STRESS POST PEAK BP: NORMAL MMHG
STRESS POST PEAK HR: 112 BPM
STRESS TARGET HR: 119 BPM
STRESS TARGET HR: 119 BPM

## 2022-02-03 PROCEDURE — 25010000002 REGADENOSON 0.4 MG/5ML SOLUTION: Performed by: INTERNAL MEDICINE

## 2022-02-03 PROCEDURE — 0 TECHNETIUM TETROFOSMIN KIT: Performed by: INTERNAL MEDICINE

## 2022-02-03 PROCEDURE — 78452 HT MUSCLE IMAGE SPECT MULT: CPT

## 2022-02-03 PROCEDURE — 78452 HT MUSCLE IMAGE SPECT MULT: CPT | Performed by: INTERNAL MEDICINE

## 2022-02-03 PROCEDURE — 25010000002 PERFLUTREN (DEFINITY) 8.476 MG IN SODIUM CHLORIDE (PF) 0.9 % 10 ML INJECTION: Performed by: INTERNAL MEDICINE

## 2022-02-03 PROCEDURE — A9502 TC99M TETROFOSMIN: HCPCS | Performed by: INTERNAL MEDICINE

## 2022-02-03 PROCEDURE — 93306 TTE W/DOPPLER COMPLETE: CPT | Performed by: INTERNAL MEDICINE

## 2022-02-03 PROCEDURE — 93306 TTE W/DOPPLER COMPLETE: CPT

## 2022-02-03 PROCEDURE — 93356 MYOCRD STRAIN IMG SPCKL TRCK: CPT

## 2022-02-03 PROCEDURE — 93356 MYOCRD STRAIN IMG SPCKL TRCK: CPT | Performed by: INTERNAL MEDICINE

## 2022-02-03 PROCEDURE — 93016 CV STRESS TEST SUPVJ ONLY: CPT | Performed by: INTERNAL MEDICINE

## 2022-02-03 PROCEDURE — 93018 CV STRESS TEST I&R ONLY: CPT | Performed by: INTERNAL MEDICINE

## 2022-02-03 PROCEDURE — 93017 CV STRESS TEST TRACING ONLY: CPT

## 2022-02-03 RX ADMIN — TETROFOSMIN 1 DOSE: 1.38 INJECTION, POWDER, LYOPHILIZED, FOR SOLUTION INTRAVENOUS at 09:12

## 2022-02-03 RX ADMIN — REGADENOSON 0.4 MG: 0.08 INJECTION, SOLUTION INTRAVENOUS at 10:45

## 2022-02-03 RX ADMIN — TETROFOSMIN 1 DOSE: 1.38 INJECTION, POWDER, LYOPHILIZED, FOR SOLUTION INTRAVENOUS at 10:45

## 2022-02-03 RX ADMIN — PERFLUTREN 1.5 ML: 6.52 INJECTION, SUSPENSION INTRAVENOUS at 09:50

## 2022-02-04 NOTE — PROGRESS NOTES
Please notify patient that stress test is normal with no evidence for significant coronary artery disease.  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  Does not need treatment but he will need follow-up visit and he can always report any worsening shortness of breath.  I will see him in clinic in 6 months.  He can proceed with vascular surgery. Let me know if he has any questions.   (tried to call him but did not answer)Thank you

## 2022-02-07 ENCOUNTER — TRANSCRIBE ORDERS (OUTPATIENT)
Dept: ADMINISTRATIVE | Facility: HOSPITAL | Age: 82
End: 2022-02-07

## 2022-02-07 ENCOUNTER — TELEPHONE (OUTPATIENT)
Dept: CARDIOLOGY | Facility: CLINIC | Age: 82
End: 2022-02-07

## 2022-02-07 DIAGNOSIS — Z01.818 OTHER SPECIFIED PRE-OPERATIVE EXAMINATION: Primary | ICD-10-CM

## 2022-02-07 DIAGNOSIS — I73.9 PAD (PERIPHERAL ARTERY DISEASE): Primary | ICD-10-CM

## 2022-02-07 NOTE — TELEPHONE ENCOUNTER
----- Message from Gabriel Joel MD sent at 2/4/2022 10:38 AM EST -----  Please notify patient that stress test is normal with no evidence for significant coronary artery disease.  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  Does not need treatment but he will need follow-up visit and he can always report any worsening shortness of breath.  I will see him in clinic in 6 months.  He can proceed with vascular surgery. Let me know if he has any questions.   (tried to call him but did not answer)Thank you

## 2022-02-09 ENCOUNTER — HOSPITAL ENCOUNTER (OUTPATIENT)
Dept: CARDIOLOGY | Facility: HOSPITAL | Age: 82
Discharge: HOME | End: 2022-02-09

## 2022-02-09 ENCOUNTER — HOSPITAL ENCOUNTER (OUTPATIENT)
Dept: GENERAL RADIOLOGY | Facility: HOSPITAL | Age: 82
Discharge: HOME | End: 2022-02-09

## 2022-02-09 ENCOUNTER — LAB (OUTPATIENT)
Dept: LAB | Facility: HOSPITAL | Age: 82
End: 2022-02-09

## 2022-02-09 DIAGNOSIS — I73.9 PAD (PERIPHERAL ARTERY DISEASE): ICD-10-CM

## 2022-02-09 DIAGNOSIS — Z01.818 OTHER SPECIFIED PRE-OPERATIVE EXAMINATION: ICD-10-CM

## 2022-02-09 LAB
BH CV LOWER ARTERIAL LEFT ABI RATIO: 0.65
BH CV LOWER ARTERIAL LEFT DORSALIS PEDIS SYS MAX: 146 MMHG
BH CV LOWER ARTERIAL LEFT GREAT TOE SYS MAX: 120 MMHG
BH CV LOWER ARTERIAL LEFT POST TIBIAL SYS MAX: 146 MMHG
BH CV LOWER ARTERIAL LEFT TBI RATIO: 0.69
BH CV LOWER ARTERIAL RIGHT ABI RATIO: 0.55
BH CV LOWER ARTERIAL RIGHT DORSALIS PEDIS SYS MAX: 89 MMHG
BH CV LOWER ARTERIAL RIGHT GREAT TOE SYS MAX: 70 MMHG
BH CV LOWER ARTERIAL RIGHT HIGH THIGH SYS MAX: 162 MMHG
BH CV LOWER ARTERIAL RIGHT LOW THIGH SYS MAX: 140 MMHG
BH CV LOWER ARTERIAL RIGHT POPLITEAL SYS MAX: 106 MMHG
BH CV LOWER ARTERIAL RIGHT POST TIBIAL SYS MAX: 95 MMHG
BH CV LOWER ARTERIAL RIGHT TBI RATIO: 0.4
MAXIMAL PREDICTED HEART RATE: 140 BPM
SARS-COV-2 ORF1AB RESP QL NAA+PROBE: NOT DETECTED
STRESS TARGET HR: 119 BPM
UPPER ARTERIAL LEFT ARM BRACHIAL SYS MAX: 173 MMHG
UPPER ARTERIAL RIGHT ARM BRACHIAL SYS MAX: 173 MMHG

## 2022-02-09 PROCEDURE — 73630 X-RAY EXAM OF FOOT: CPT

## 2022-02-09 PROCEDURE — 93923 UPR/LXTR ART STDY 3+ LVLS: CPT

## 2022-02-09 PROCEDURE — C9803 HOPD COVID-19 SPEC COLLECT: HCPCS

## 2022-02-09 PROCEDURE — U0004 COV-19 TEST NON-CDC HGH THRU: HCPCS

## 2022-02-11 ENCOUNTER — ANESTHESIA (OUTPATIENT)
Dept: PERIOP | Facility: HOSPITAL | Age: 82
End: 2022-02-11

## 2022-02-11 ENCOUNTER — HOSPITAL ENCOUNTER (OUTPATIENT)
Facility: HOSPITAL | Age: 82
Setting detail: HOSPITAL OUTPATIENT SURGERY
Discharge: HOME | End: 2022-02-11
Attending: SURGERY | Admitting: SURGERY

## 2022-02-11 ENCOUNTER — ANESTHESIA EVENT (OUTPATIENT)
Dept: PERIOP | Facility: HOSPITAL | Age: 82
End: 2022-02-11

## 2022-02-11 VITALS
DIASTOLIC BLOOD PRESSURE: 91 MMHG | HEART RATE: 73 BPM | BODY MASS INDEX: 28.94 KG/M2 | WEIGHT: 202.16 LBS | HEIGHT: 70 IN | TEMPERATURE: 97.7 F | RESPIRATION RATE: 18 BRPM | SYSTOLIC BLOOD PRESSURE: 156 MMHG | OXYGEN SATURATION: 97 %

## 2022-02-11 DIAGNOSIS — I70.245 ATHEROSCLEROSIS OF NATIVE ARTERIES OF LEFT LEG WITH ULCERATION OF OTHER PART OF FOOT: Primary | ICD-10-CM

## 2022-02-11 DIAGNOSIS — I70.262 ATHEROSCLEROSIS OF NATIVE ARTERY OF LEFT LOWER EXTREMITY WITH GANGRENE: ICD-10-CM

## 2022-02-11 PROCEDURE — 0 CEFAZOLIN PER 500 MG: Performed by: SURGERY

## 2022-02-11 PROCEDURE — 88311 DECALCIFY TISSUE: CPT | Performed by: SURGERY

## 2022-02-11 PROCEDURE — 25010000002 MIDAZOLAM PER 1 MG: Performed by: ANESTHESIOLOGY

## 2022-02-11 PROCEDURE — 87070 CULTURE OTHR SPECIMN AEROBIC: CPT | Performed by: SURGERY

## 2022-02-11 PROCEDURE — 25010000002 FENTANYL CITRATE (PF) 50 MCG/ML SOLUTION: Performed by: ANESTHESIOLOGY

## 2022-02-11 PROCEDURE — 0 MEPIVACAINE HCL (PF) 1.5 % SOLUTION: Performed by: ANESTHESIOLOGY

## 2022-02-11 PROCEDURE — 88305 TISSUE EXAM BY PATHOLOGIST: CPT | Performed by: SURGERY

## 2022-02-11 PROCEDURE — 0 CEFAZOLIN IN DEXTROSE 2-4 GM/100ML-% SOLUTION: Performed by: SURGERY

## 2022-02-11 PROCEDURE — 87015 SPECIMEN INFECT AGNT CONCNTJ: CPT | Performed by: SURGERY

## 2022-02-11 PROCEDURE — 87205 SMEAR GRAM STAIN: CPT | Performed by: SURGERY

## 2022-02-11 PROCEDURE — 87186 SC STD MICRODIL/AGAR DIL: CPT | Performed by: SURGERY

## 2022-02-11 PROCEDURE — 25010000002 ROPIVACAINE PER 1 MG: Performed by: ANESTHESIOLOGY

## 2022-02-11 PROCEDURE — 87075 CULTR BACTERIA EXCEPT BLOOD: CPT | Performed by: SURGERY

## 2022-02-11 PROCEDURE — 76942 ECHO GUIDE FOR BIOPSY: CPT | Performed by: SURGERY

## 2022-02-11 PROCEDURE — 87147 CULTURE TYPE IMMUNOLOGIC: CPT | Performed by: SURGERY

## 2022-02-11 PROCEDURE — 25010000002 PROPOFOL 10 MG/ML EMULSION: Performed by: NURSE ANESTHETIST, CERTIFIED REGISTERED

## 2022-02-11 RX ORDER — ROPIVACAINE HYDROCHLORIDE 5 MG/ML
INJECTION, SOLUTION EPIDURAL; INFILTRATION; PERINEURAL
Status: COMPLETED | OUTPATIENT
Start: 2022-02-11 | End: 2022-02-11

## 2022-02-11 RX ORDER — PROMETHAZINE HYDROCHLORIDE 12.5 MG/1
25 TABLET ORAL ONCE AS NEEDED
Status: DISCONTINUED | OUTPATIENT
Start: 2022-02-11 | End: 2022-02-11 | Stop reason: HOSPADM

## 2022-02-11 RX ORDER — SODIUM CHLORIDE 0.9 % (FLUSH) 0.9 %
10 SYRINGE (ML) INJECTION EVERY 12 HOURS SCHEDULED
Status: DISCONTINUED | OUTPATIENT
Start: 2022-02-11 | End: 2022-02-11 | Stop reason: HOSPADM

## 2022-02-11 RX ORDER — HYDROCODONE BITARTRATE AND ACETAMINOPHEN 7.5; 325 MG/1; MG/1
1-2 TABLET ORAL EVERY 6 HOURS PRN
Qty: 30 TABLET | Refills: 0 | Status: ON HOLD | OUTPATIENT
Start: 2022-02-11 | End: 2022-06-28

## 2022-02-11 RX ORDER — NALOXONE HCL 0.4 MG/ML
0.2 VIAL (ML) INJECTION AS NEEDED
Status: DISCONTINUED | OUTPATIENT
Start: 2022-02-11 | End: 2022-02-11 | Stop reason: HOSPADM

## 2022-02-11 RX ORDER — GABAPENTIN 300 MG/1
300 CAPSULE ORAL 2 TIMES DAILY
Qty: 180 CAPSULE | Refills: 1 | Status: SHIPPED | OUTPATIENT
Start: 2022-02-11 | End: 2022-07-27

## 2022-02-11 RX ORDER — DIPHENHYDRAMINE HCL 25 MG
25 CAPSULE ORAL
Status: DISCONTINUED | OUTPATIENT
Start: 2022-02-11 | End: 2022-02-11 | Stop reason: HOSPADM

## 2022-02-11 RX ORDER — HYDROMORPHONE HYDROCHLORIDE 1 MG/ML
0.25 INJECTION, SOLUTION INTRAMUSCULAR; INTRAVENOUS; SUBCUTANEOUS
Status: DISCONTINUED | OUTPATIENT
Start: 2022-02-11 | End: 2022-02-11 | Stop reason: HOSPADM

## 2022-02-11 RX ORDER — PROMETHAZINE HYDROCHLORIDE 25 MG/1
25 SUPPOSITORY RECTAL ONCE AS NEEDED
Status: DISCONTINUED | OUTPATIENT
Start: 2022-02-11 | End: 2022-02-11 | Stop reason: HOSPADM

## 2022-02-11 RX ORDER — FENTANYL CITRATE 50 UG/ML
INJECTION, SOLUTION INTRAMUSCULAR; INTRAVENOUS
Status: COMPLETED | OUTPATIENT
Start: 2022-02-11 | End: 2022-02-11

## 2022-02-11 RX ORDER — GLYCOPYRROLATE 0.2 MG/ML
INJECTION INTRAMUSCULAR; INTRAVENOUS AS NEEDED
Status: DISCONTINUED | OUTPATIENT
Start: 2022-02-11 | End: 2022-02-11 | Stop reason: SURG

## 2022-02-11 RX ORDER — LIDOCAINE HYDROCHLORIDE 20 MG/ML
INJECTION, SOLUTION INFILTRATION; PERINEURAL AS NEEDED
Status: DISCONTINUED | OUTPATIENT
Start: 2022-02-11 | End: 2022-02-11 | Stop reason: SURG

## 2022-02-11 RX ORDER — ALBUTEROL SULFATE 2.5 MG/3ML
2.5 SOLUTION RESPIRATORY (INHALATION) ONCE AS NEEDED
Status: DISCONTINUED | OUTPATIENT
Start: 2022-02-11 | End: 2022-02-11 | Stop reason: HOSPADM

## 2022-02-11 RX ORDER — ONDANSETRON 2 MG/ML
4 INJECTION INTRAMUSCULAR; INTRAVENOUS ONCE AS NEEDED
Status: DISCONTINUED | OUTPATIENT
Start: 2022-02-11 | End: 2022-02-11 | Stop reason: HOSPADM

## 2022-02-11 RX ORDER — PROPOFOL 10 MG/ML
VIAL (ML) INTRAVENOUS AS NEEDED
Status: DISCONTINUED | OUTPATIENT
Start: 2022-02-11 | End: 2022-02-11 | Stop reason: SURG

## 2022-02-11 RX ORDER — SILVER SULFADIAZINE 1 %
CREAM (GRAM) TOPICAL
COMMUNITY
Start: 2022-02-07 | End: 2022-06-27

## 2022-02-11 RX ORDER — LABETALOL HYDROCHLORIDE 5 MG/ML
5 INJECTION, SOLUTION INTRAVENOUS
Status: DISCONTINUED | OUTPATIENT
Start: 2022-02-11 | End: 2022-02-11 | Stop reason: HOSPADM

## 2022-02-11 RX ORDER — HYDROCODONE BITARTRATE AND ACETAMINOPHEN 7.5; 325 MG/1; MG/1
2 TABLET ORAL EVERY 4 HOURS PRN
Status: DISCONTINUED | OUTPATIENT
Start: 2022-02-11 | End: 2022-02-11 | Stop reason: HOSPADM

## 2022-02-11 RX ORDER — FAMOTIDINE 10 MG/ML
20 INJECTION, SOLUTION INTRAVENOUS
Status: COMPLETED | OUTPATIENT
Start: 2022-02-11 | End: 2022-02-11

## 2022-02-11 RX ORDER — EPHEDRINE SULFATE 50 MG/ML
5 INJECTION, SOLUTION INTRAVENOUS ONCE AS NEEDED
Status: DISCONTINUED | OUTPATIENT
Start: 2022-02-11 | End: 2022-02-11 | Stop reason: HOSPADM

## 2022-02-11 RX ORDER — AMOXICILLIN AND CLAVULANATE POTASSIUM 875; 125 MG/1; MG/1
TABLET, FILM COATED ORAL
COMMUNITY
Start: 2022-02-07 | End: 2022-06-27

## 2022-02-11 RX ORDER — MIDAZOLAM HYDROCHLORIDE 1 MG/ML
INJECTION INTRAMUSCULAR; INTRAVENOUS
Status: COMPLETED | OUTPATIENT
Start: 2022-02-11 | End: 2022-02-11

## 2022-02-11 RX ORDER — FENTANYL CITRATE 50 UG/ML
25 INJECTION, SOLUTION INTRAMUSCULAR; INTRAVENOUS
Status: DISCONTINUED | OUTPATIENT
Start: 2022-02-11 | End: 2022-02-11 | Stop reason: HOSPADM

## 2022-02-11 RX ORDER — OXYCODONE AND ACETAMINOPHEN 7.5; 325 MG/1; MG/1
1 TABLET ORAL EVERY 4 HOURS PRN
Status: DISCONTINUED | OUTPATIENT
Start: 2022-02-11 | End: 2022-02-11 | Stop reason: HOSPADM

## 2022-02-11 RX ORDER — IBUPROFEN 600 MG/1
600 TABLET ORAL ONCE AS NEEDED
Status: DISCONTINUED | OUTPATIENT
Start: 2022-02-11 | End: 2022-02-11 | Stop reason: HOSPADM

## 2022-02-11 RX ORDER — PROPOFOL 10 MG/ML
VIAL (ML) INTRAVENOUS CONTINUOUS PRN
Status: DISCONTINUED | OUTPATIENT
Start: 2022-02-11 | End: 2022-02-11 | Stop reason: SURG

## 2022-02-11 RX ORDER — FLUMAZENIL 0.1 MG/ML
0.2 INJECTION INTRAVENOUS AS NEEDED
Status: DISCONTINUED | OUTPATIENT
Start: 2022-02-11 | End: 2022-02-11 | Stop reason: HOSPADM

## 2022-02-11 RX ORDER — SODIUM CHLORIDE, SODIUM LACTATE, POTASSIUM CHLORIDE, CALCIUM CHLORIDE 600; 310; 30; 20 MG/100ML; MG/100ML; MG/100ML; MG/100ML
9 INJECTION, SOLUTION INTRAVENOUS CONTINUOUS PRN
Status: DISCONTINUED | OUTPATIENT
Start: 2022-02-11 | End: 2022-02-11 | Stop reason: HOSPADM

## 2022-02-11 RX ORDER — SODIUM CHLORIDE 0.9 % (FLUSH) 0.9 %
10 SYRINGE (ML) INJECTION AS NEEDED
Status: DISCONTINUED | OUTPATIENT
Start: 2022-02-11 | End: 2022-02-11 | Stop reason: HOSPADM

## 2022-02-11 RX ORDER — HYDROCODONE BITARTRATE AND ACETAMINOPHEN 7.5; 325 MG/1; MG/1
1 TABLET ORAL ONCE AS NEEDED
Status: DISCONTINUED | OUTPATIENT
Start: 2022-02-11 | End: 2022-02-11 | Stop reason: HOSPADM

## 2022-02-11 RX ORDER — CEFAZOLIN SODIUM 2 G/100ML
2 INJECTION, SOLUTION INTRAVENOUS ONCE
Status: COMPLETED | OUTPATIENT
Start: 2022-02-11 | End: 2022-02-11

## 2022-02-11 RX ORDER — MIDAZOLAM HYDROCHLORIDE 1 MG/ML
1 INJECTION INTRAMUSCULAR; INTRAVENOUS
Status: DISCONTINUED | OUTPATIENT
Start: 2022-02-11 | End: 2022-02-11 | Stop reason: HOSPADM

## 2022-02-11 RX ORDER — DIPHENHYDRAMINE HYDROCHLORIDE 50 MG/ML
12.5 INJECTION INTRAMUSCULAR; INTRAVENOUS
Status: DISCONTINUED | OUTPATIENT
Start: 2022-02-11 | End: 2022-02-11 | Stop reason: HOSPADM

## 2022-02-11 RX ORDER — HYDRALAZINE HYDROCHLORIDE 20 MG/ML
5 INJECTION INTRAMUSCULAR; INTRAVENOUS
Status: DISCONTINUED | OUTPATIENT
Start: 2022-02-11 | End: 2022-02-11 | Stop reason: HOSPADM

## 2022-02-11 RX ADMIN — MEPIVACAINE HYDROCHLORIDE 10 ML: 15 INJECTION, SOLUTION EPIDURAL; INFILTRATION at 10:15

## 2022-02-11 RX ADMIN — ROPIVACAINE HYDROCHLORIDE 30 ML: 5 INJECTION, SOLUTION EPIDURAL; INFILTRATION; PERINEURAL at 10:15

## 2022-02-11 RX ADMIN — FENTANYL CITRATE 50 MCG: 0.05 INJECTION, SOLUTION INTRAMUSCULAR; INTRAVENOUS at 10:14

## 2022-02-11 RX ADMIN — LIDOCAINE HYDROCHLORIDE 60 MG: 20 INJECTION, SOLUTION INFILTRATION; PERINEURAL at 11:07

## 2022-02-11 RX ADMIN — PROPOFOL 100 MG: 10 INJECTION, EMULSION INTRAVENOUS at 11:08

## 2022-02-11 RX ADMIN — CEFAZOLIN SODIUM 2 G: 2 INJECTION, SOLUTION INTRAVENOUS at 10:52

## 2022-02-11 RX ADMIN — SODIUM CHLORIDE, POTASSIUM CHLORIDE, SODIUM LACTATE AND CALCIUM CHLORIDE 9 ML/HR: 600; 310; 30; 20 INJECTION, SOLUTION INTRAVENOUS at 09:44

## 2022-02-11 RX ADMIN — MIDAZOLAM 1 MG: 1 INJECTION INTRAMUSCULAR; INTRAVENOUS at 10:14

## 2022-02-11 RX ADMIN — Medication 120 MCG/KG/MIN: at 11:09

## 2022-02-11 RX ADMIN — GLYCOPYRROLATE 0.2 MG: 0.2 INJECTION INTRAMUSCULAR; INTRAVENOUS at 11:05

## 2022-02-11 RX ADMIN — MIDAZOLAM 1 MG: 1 INJECTION INTRAMUSCULAR; INTRAVENOUS at 10:15

## 2022-02-11 RX ADMIN — FAMOTIDINE 20 MG: 10 INJECTION INTRAVENOUS at 09:44

## 2022-02-11 NOTE — ANESTHESIA POSTPROCEDURE EVALUATION
Patient: Sohan De La Torre    Procedure Summary     Date: 02/11/22 Room / Location: [facility] OR [address] [place] [address] MAIN OR    Anesthesia Start: 1102 Anesthesia Stop: 1151    Procedure: LEFT THIRD TOE AMPUTATION (Left ) Diagnosis:     Surgeons: Sohan Burger MD Provider: Ephraim Woodward MD    Anesthesia Type: regional ASA Status: 3          Anesthesia Type: regional    Vitals  Vitals Value Taken Time   /93 02/11/22 1246   Temp 36.5 °C (97.7 °F) 02/11/22 1149   Pulse 67 02/11/22 1250   Resp 16 02/11/22 1230   SpO2 98 % 02/11/22 1250   Vitals shown include unvalidated device data.        Post Anesthesia Care and Evaluation    Patient location during evaluation: PHASE II  Patient participation: complete - patient participated  Level of consciousness: awake and alert  Pain management: adequate  Airway patency: patent  Anesthetic complications: No anesthetic complications  PONV Status: none  Cardiovascular status: acceptable and hemodynamically stable  Respiratory status: acceptable, nonlabored ventilation and spontaneous ventilation  Hydration status: acceptable

## 2022-02-11 NOTE — ANESTHESIA PROCEDURE NOTES
Peripheral Block    Pre-sedation assessment completed: 2/11/2022 10:15 AM    Patient reassessed immediately prior to procedure    Patient location during procedure: holding area  Start time: 2/11/2022 10:15 AM  Stop time: 2/11/2022 10:25 AM  Reason for block: at surgeon's request and post-op pain management  Performed by  Anesthesiologist: Guido Moran MD  Preanesthetic Checklist  Completed: patient identified, IV checked, site marked, risks and benefits discussed, surgical consent, monitors and equipment checked, pre-op evaluation and timeout performed  Prep:  Sterile barriers:cap, gloves, gown, mask and sterile barriers  Prep: ChloraPrep  Patient monitoring: blood pressure monitoring, continuous pulse oximetry and EKG  Procedure    Sedation: yes  Performed under: local infiltration  Guidance:ultrasound guided    ULTRASOUND INTERPRETATION.  Using ultrasound guidance a 21 G gauge needle was placed in close proximity to the sciatic nerve, at which point, under ultrasound guidance anesthetic was injected in the area of the nerve and spread of the anesthesia was seen on ultrasound in close proximity thereto.  There were no abnormalities seen on ultrasound; a digital image was taken; and the patient tolerated the procedure with no complications. Images:still images obtained    Laterality:left  Block Type:popliteal  Injection Technique:single-shot  Needle Type:echogenic  Needle Gauge:21 G      Medications Used: Mepivacaine HCl (PF) (CARBOCAINE) 1.5 % injection, 10 mL  ropivacaine (NAROPIN) 0.5 % injection, 30 mL      Medications  Comment:Ultrasound Interpretation: Using ultrasound guidance, the needle was placed in close proximity to the target nerve and anesthetic was injected in the area of the target nerve and/or bundles, and spread of the anesthetic was seen on ultrasound in close proximity thereto.  There were no abnormalities seen on ultrasound; a digital / physical image was taken; and the patient  tolerated the procedure with no complications.   Block placed for postoperative pain control per surgeon request.    Post Assessment  Injection Assessment: negative aspiration for heme, no paresthesia on injection and incremental injection  Patient Tolerance:comfortable throughout block  Complications:no

## 2022-02-11 NOTE — ANESTHESIA PREPROCEDURE EVALUATION
Anesthesia Evaluation     Patient summary reviewed                Airway   Mallampati: II  Neck ROM: full  No difficulty expected  Dental      Pulmonary    Cardiovascular     ECG reviewed  Rhythm: regular    (+) hypertension, valvular problems/murmurs (mild-moderate) MR,       Neuro/Psych  GI/Hepatic/Renal/Endo      Musculoskeletal     Abdominal    Substance History      OB/GYN          Other   arthritis,                      Anesthesia Plan    ASA 3     regional       Anesthetic plan, all risks, benefits, and alternatives have been provided, discussed and informed consent has been obtained with: patient.  Use of blood products discussed with patient .       CODE STATUS:

## 2022-02-13 LAB
BACTERIA SPEC AEROBE CULT: ABNORMAL
BACTERIA SPEC AEROBE CULT: ABNORMAL
GRAM STN SPEC: ABNORMAL
GRAM STN SPEC: ABNORMAL

## 2022-02-14 LAB
LAB AP CASE REPORT: NORMAL
PATH REPORT.FINAL DX SPEC: NORMAL
PATH REPORT.GROSS SPEC: NORMAL

## 2022-02-16 LAB — BACTERIA SPEC ANAEROBE CULT: NORMAL

## 2022-06-27 ENCOUNTER — PRE-ADMISSION TESTING (OUTPATIENT)
Dept: PREADMISSION TESTING | Facility: HOSPITAL | Age: 82
End: 2022-06-27

## 2022-06-27 VITALS
WEIGHT: 204.7 LBS | BODY MASS INDEX: 29.31 KG/M2 | HEIGHT: 70 IN | SYSTOLIC BLOOD PRESSURE: 153 MMHG | DIASTOLIC BLOOD PRESSURE: 78 MMHG | OXYGEN SATURATION: 95 % | TEMPERATURE: 97.9 F | HEART RATE: 76 BPM | RESPIRATION RATE: 16 BRPM

## 2022-06-27 LAB
ANION GAP SERPL CALCULATED.3IONS-SCNC: 11.3 MMOL/L (ref 5–15)
BUN SERPL-MCNC: 15 MG/DL (ref 8–23)
BUN/CREAT SERPL: 13.8 (ref 7–25)
CALCIUM SPEC-SCNC: 9.3 MG/DL (ref 8.6–10.5)
CHLORIDE SERPL-SCNC: 100 MMOL/L (ref 98–107)
CO2 SERPL-SCNC: 28.7 MMOL/L (ref 22–29)
CREAT SERPL-MCNC: 1.09 MG/DL (ref 0.76–1.27)
DEPRECATED RDW RBC AUTO: 42.1 FL (ref 37–54)
EGFRCR SERPLBLD CKD-EPI 2021: 68.6 ML/MIN/1.73
ERYTHROCYTE [DISTWIDTH] IN BLOOD BY AUTOMATED COUNT: 13.2 % (ref 12.3–15.4)
GLUCOSE SERPL-MCNC: 183 MG/DL (ref 65–99)
HCT VFR BLD AUTO: 47.6 % (ref 37.5–51)
HGB BLD-MCNC: 15.8 G/DL (ref 13–17.7)
MCH RBC QN AUTO: 29.2 PG (ref 26.6–33)
MCHC RBC AUTO-ENTMCNC: 33.2 G/DL (ref 31.5–35.7)
MCV RBC AUTO: 87.8 FL (ref 79–97)
PLATELET # BLD AUTO: 284 10*3/MM3 (ref 140–450)
PMV BLD AUTO: 11.1 FL (ref 6–12)
POTASSIUM SERPL-SCNC: 3.8 MMOL/L (ref 3.5–5.2)
RBC # BLD AUTO: 5.42 10*6/MM3 (ref 4.14–5.8)
SARS-COV-2 ORF1AB RESP QL NAA+PROBE: NOT DETECTED
SODIUM SERPL-SCNC: 140 MMOL/L (ref 136–145)
WBC NRBC COR # BLD: 9.53 10*3/MM3 (ref 3.4–10.8)

## 2022-06-27 PROCEDURE — 85027 COMPLETE CBC AUTOMATED: CPT

## 2022-06-27 PROCEDURE — U0004 COV-19 TEST NON-CDC HGH THRU: HCPCS

## 2022-06-27 PROCEDURE — C9803 HOPD COVID-19 SPEC COLLECT: HCPCS

## 2022-06-27 PROCEDURE — 80048 BASIC METABOLIC PNL TOTAL CA: CPT

## 2022-06-27 PROCEDURE — 36415 COLL VENOUS BLD VENIPUNCTURE: CPT

## 2022-06-27 RX ORDER — CHLORHEXIDINE GLUCONATE 500 MG/1
1 CLOTH TOPICAL TAKE AS DIRECTED
COMMUNITY
End: 2022-07-31 | Stop reason: HOSPADM

## 2022-06-27 NOTE — DISCHARGE INSTRUCTIONS
CHLORHEXIDINE CLOTH INSTRUCTIONS  The morning of surgery follow these instructions using the Chlorhexidine cloths you've been given.  These steps reduce bacteria on the body.  Do not use the cloths near your eyes, ears mouth, genitalia or on open wounds.  Throw the cloths away after use but do not try to flush them down a toilet.      Open and remove one cloth at a time from the package.    Leave the cloth unfolded and begin the bathing.  Massage the skin with the cloths using gentle pressure to remove bacteria.  Do not scrub harshly.   Follow the steps below with one 2% CHG cloth per area (6 total cloths).  One cloth for neck, shoulders and chest.  One cloth for both arms, hands, fingers and underarms (do underarms last).  One cloth for the abdomen followed by groin.  One cloth for right leg and foot including between the toes.  One cloth for left leg and foot including between the toes.  The last cloth is to be used for the back of the neck, back and buttocks.    Allow the CHG to air dry 3 minutes on the skin which will give it time to work and decrease the chance of irritation.  The skin may feel sticky until it is dry.  Do not rinse with water or any other liquid or you will lose the beneficial effects of the CHG.  If mild skin irritation occurs, do rinse the skin to remove the CHG.  Report this to the nurse at time of admission.  Do not apply lotions, creams, ointments, deodorants or perfumes after using the clothes. Dress in clean clothes before coming to the hospital.     Take the following medications the morning of surgery: NONE     ARRIVE AT 6:30 AM.      If you are on prescription narcotic pain medication to control your pain you may also take that medication the morning of surgery.    General Instructions:  Do not eat solid food after midnight the night before surgery.  You may drink clear liquids day of surgery but must stop at least one hour before your hospital arrival time. CUTOFF TIME IS 5:30  AM.  It is beneficial for you to have a clear drink that contains carbohydrates the day of surgery.  We suggest a 12 to 20 ounce bottle of Gatorade or Powerade for non-diabetic patients or a 12 to 20 ounce bottle of G2 or Powerade Zero for diabetic patients. (Pediatric patients, are not advised to drink a 12 to 20 ounce carbohydrate drink)    Clear liquids are liquids you can see through.  Nothing red in color.     Plain water                               Sports drinks  Sodas                                   Gelatin (Jell-O)  Fruit juices without pulp such as white grape juice and apple juice  Popsicles that contain no fruit or yogurt  Tea or coffee (no cream or milk added)  Gatorade / Powerade  G2 / Powerade Zero    Patients who avoid smoking, chewing tobacco and alcohol for 4 weeks prior to surgery have a reduced risk of post-operative complications.  Quit smoking as many days before surgery as you can.  Do not smoke, use chewing tobacco or drink alcohol the day of surgery.   If applicable bring your C-PAP/ BI-PAP machine.  Bring any papers given to you in the doctor’s office.  Wear clean comfortable clothes.  Do not wear contact lenses, false eyelashes or make-up.  Bring a case for your glasses.   Bring crutches or walker if applicable.  Remove all piercings.  Leave jewelry and any other valuables at home.  Hair extensions with metal clips must be removed prior to surgery.  The Pre-Admission Testing nurse will instruct you to bring medications if unable to obtain an accurate list in Pre-Admission Testing.          Preventing a Surgical Site Infection:  For 2 to 3 days before surgery, avoid shaving with a razor because the razor can irritate skin and make it easier to develop an infection.    Any areas of open skin can increase the risk of a post-operative wound infection by allowing bacteria to enter and travel throughout the body.  Notify your surgeon if you have any skin wounds / rashes even if it is not near  the expected surgical site.  The area will need assessed to determine if surgery should be delayed until it is healed.  The night prior to surgery shower using a fresh bar of anti-bacterial soap (such as Dial) and clean washcloth.  Sleep in a clean bed with clean clothing.  Do not allow pets to sleep with you.  Shower on the morning of surgery using a fresh bar of anti-bacterial soap (such as Dial) and clean washcloth.  Dry with a clean towel and dress in clean clothing.  Ask your surgeon if you will be receiving antibiotics prior to surgery.  Make sure you, your family, and all healthcare providers clean their hands with soap and water or an alcohol based hand  before caring for you or your wound.    Day of surgery:  Your arrival time is approximately two hours before your scheduled surgery time.  Upon arrival, a Pre-op nurse and Anesthesiologist will review your health history, obtain vital signs, and answer questions you may have.  The only belongings needed at this time will be a list of your home medications and if applicable your C-PAP/BI-PAP machine.  A Pre-op nurse will start an IV and you may receive medication in preparation for surgery, including something to help you relax.     Please be aware that surgery does come with discomfort.  We want to make every effort to control your discomfort so please discuss any uncontrolled symptoms with your nurse.   Your doctor will most likely have prescribed pain medications.      If you are going home after surgery you will receive individualized written care instructions before being discharged.  A responsible adult must drive you to and from the hospital on the day of your surgery and stay with you for 24 hours.  Discharge prescriptions can be filled by the hospital pharmacy during regular pharmacy hours.  If you are having surgery late in the day/evening your prescription may be e-prescribed to your pharmacy.  Please verify your pharmacy hours or chose a  24 hour pharmacy to avoid not having access to your prescription because your pharmacy has closed for the day.    If you are staying overnight following surgery, you will be transported to your hospital room following the recovery period.  [facility] has all private rooms.    If you have any questions please call Pre-Admission Testing at [phone].  Deductibles and co-payments are collected on the day of service. Please be prepared to pay the required co-pay, deductible or deposit on the day of service as defined by your plan.    Patient Education for Self-Quarantine Process    Following your COVID testing, we strongly recommend that you wear a mask when you are with other people and practice social distancing.   Limit your activities to only required outings.  Wash your hands with soap and water frequently for at least 20 seconds.   Avoid touching your eyes, nose and mouth with unwashed hands.  Do not share anything - utensils, drinking glasses, food from the same bowl.   Sanitize household surfaces daily. Include all high touch areas (door handles, light switches, phones, countertops, etc.)    Call your surgeon immediately if you experience any of the following symptoms:  Sore Throat  Shortness of Breath or difficulty breathing  Cough  Chills  Body soreness or muscle pain  Headache  Fever  New loss of taste or smell  Do not arrive for your surgery ill.  Your procedure will need to be rescheduled to another time.  You will need to call your physician before the day of surgery to avoid any unnecessary exposure to hospital staff as well as other patients.

## 2022-06-28 ENCOUNTER — HOSPITAL ENCOUNTER (OUTPATIENT)
Facility: HOSPITAL | Age: 82
Setting detail: SURGERY ADMIT
Discharge: HOME | End: 2022-06-28
Attending: SURGERY | Admitting: SURGERY

## 2022-06-28 ENCOUNTER — ANESTHESIA (OUTPATIENT)
Dept: PERIOP | Facility: HOSPITAL | Age: 82
End: 2022-06-28

## 2022-06-28 ENCOUNTER — ANESTHESIA EVENT (OUTPATIENT)
Dept: PERIOP | Facility: HOSPITAL | Age: 82
End: 2022-06-28

## 2022-06-28 ENCOUNTER — APPOINTMENT (OUTPATIENT)
Dept: GENERAL RADIOLOGY | Facility: HOSPITAL | Age: 82
End: 2022-06-28

## 2022-06-28 VITALS
RESPIRATION RATE: 16 BRPM | DIASTOLIC BLOOD PRESSURE: 67 MMHG | WEIGHT: 203.93 LBS | OXYGEN SATURATION: 97 % | HEART RATE: 60 BPM | TEMPERATURE: 97.9 F | BODY MASS INDEX: 29.19 KG/M2 | HEIGHT: 70 IN | SYSTOLIC BLOOD PRESSURE: 144 MMHG

## 2022-06-28 DIAGNOSIS — I70.262 ATHEROSCLEROSIS OF NATIVE ARTERY OF LEFT LOWER EXTREMITY WITH GANGRENE: Primary | ICD-10-CM

## 2022-06-28 DIAGNOSIS — I70.245 ATHEROSCLEROSIS OF NATIVE ARTERIES OF LEFT LEG WITH ULCERATION OF OTHER PART OF FOOT: ICD-10-CM

## 2022-06-28 PROBLEM — I70.629: Status: ACTIVE | Noted: 2022-06-28

## 2022-06-28 LAB — QT INTERVAL: 502 MS

## 2022-06-28 PROCEDURE — 93010 ELECTROCARDIOGRAM REPORT: CPT | Performed by: INTERNAL MEDICINE

## 2022-06-28 PROCEDURE — C1769 GUIDE WIRE: HCPCS | Performed by: SURGERY

## 2022-06-28 PROCEDURE — 75716 ARTERY X-RAYS ARMS/LEGS: CPT

## 2022-06-28 PROCEDURE — 25010000002 PROPOFOL 10 MG/ML EMULSION: Performed by: NURSE ANESTHETIST, CERTIFIED REGISTERED

## 2022-06-28 PROCEDURE — 25010000002 FENTANYL CITRATE (PF) 50 MCG/ML SOLUTION: Performed by: NURSE ANESTHETIST, CERTIFIED REGISTERED

## 2022-06-28 PROCEDURE — 75625 CONTRAST EXAM ABDOMINL AORTA: CPT

## 2022-06-28 PROCEDURE — 25010000002 HEPARIN (PORCINE) PER 1000 UNITS: Performed by: SURGERY

## 2022-06-28 PROCEDURE — 0 LIDOCAINE 1 % SOLUTION 20 ML VIAL: Performed by: SURGERY

## 2022-06-28 PROCEDURE — 25010000002 PHENYLEPHRINE 10 MG/ML SOLUTION: Performed by: NURSE ANESTHETIST, CERTIFIED REGISTERED

## 2022-06-28 PROCEDURE — 0 IOPAMIDOL PER 1 ML: Performed by: SURGERY

## 2022-06-28 PROCEDURE — 25010000002 CEFAZOLIN IN DEXTROSE 2-4 GM/100ML-% SOLUTION: Performed by: SURGERY

## 2022-06-28 PROCEDURE — C1894 INTRO/SHEATH, NON-LASER: HCPCS | Performed by: SURGERY

## 2022-06-28 PROCEDURE — 25010000002 MIDAZOLAM PER 1 MG: Performed by: ANESTHESIOLOGY

## 2022-06-28 PROCEDURE — 75710 ARTERY X-RAYS ARM/LEG: CPT

## 2022-06-28 PROCEDURE — 93005 ELECTROCARDIOGRAM TRACING: CPT | Performed by: SURGERY

## 2022-06-28 PROCEDURE — 25010000002 HEPARIN (PORCINE) PER 1000 UNITS: Performed by: NURSE ANESTHETIST, CERTIFIED REGISTERED

## 2022-06-28 PROCEDURE — C1887 CATHETER, GUIDING: HCPCS | Performed by: SURGERY

## 2022-06-28 PROCEDURE — C1760 CLOSURE DEV, VASC: HCPCS | Performed by: SURGERY

## 2022-06-28 RX ORDER — PROMETHAZINE HYDROCHLORIDE 25 MG/1
25 TABLET ORAL ONCE AS NEEDED
Status: DISCONTINUED | OUTPATIENT
Start: 2022-06-28 | End: 2022-06-28 | Stop reason: HOSPADM

## 2022-06-28 RX ORDER — EPHEDRINE SULFATE 50 MG/ML
5 INJECTION, SOLUTION INTRAVENOUS ONCE AS NEEDED
Status: DISCONTINUED | OUTPATIENT
Start: 2022-06-28 | End: 2022-06-28 | Stop reason: HOSPADM

## 2022-06-28 RX ORDER — FENTANYL CITRATE 50 UG/ML
50 INJECTION, SOLUTION INTRAMUSCULAR; INTRAVENOUS
Status: DISCONTINUED | OUTPATIENT
Start: 2022-06-28 | End: 2022-06-28 | Stop reason: HOSPADM

## 2022-06-28 RX ORDER — CEFAZOLIN SODIUM 2 G/100ML
2 INJECTION, SOLUTION INTRAVENOUS ONCE
Status: COMPLETED | OUTPATIENT
Start: 2022-06-28 | End: 2022-06-28

## 2022-06-28 RX ORDER — NALOXONE HCL 0.4 MG/ML
0.2 VIAL (ML) INJECTION AS NEEDED
Status: DISCONTINUED | OUTPATIENT
Start: 2022-06-28 | End: 2022-06-28 | Stop reason: HOSPADM

## 2022-06-28 RX ORDER — SODIUM CHLORIDE 0.9 % (FLUSH) 0.9 %
3 SYRINGE (ML) INJECTION EVERY 12 HOURS SCHEDULED
Status: DISCONTINUED | OUTPATIENT
Start: 2022-06-28 | End: 2022-06-28 | Stop reason: HOSPADM

## 2022-06-28 RX ORDER — LIDOCAINE HYDROCHLORIDE 10 MG/ML
0.5 INJECTION, SOLUTION EPIDURAL; INFILTRATION; INTRACAUDAL; PERINEURAL ONCE AS NEEDED
Status: DISCONTINUED | OUTPATIENT
Start: 2022-06-28 | End: 2022-06-28 | Stop reason: HOSPADM

## 2022-06-28 RX ORDER — MIDAZOLAM HYDROCHLORIDE 1 MG/ML
0.5 INJECTION INTRAMUSCULAR; INTRAVENOUS
Status: DISCONTINUED | OUTPATIENT
Start: 2022-06-28 | End: 2022-06-28 | Stop reason: HOSPADM

## 2022-06-28 RX ORDER — HYDRALAZINE HYDROCHLORIDE 20 MG/ML
5 INJECTION INTRAMUSCULAR; INTRAVENOUS
Status: DISCONTINUED | OUTPATIENT
Start: 2022-06-28 | End: 2022-06-28 | Stop reason: HOSPADM

## 2022-06-28 RX ORDER — ONDANSETRON 2 MG/ML
4 INJECTION INTRAMUSCULAR; INTRAVENOUS ONCE AS NEEDED
Status: DISCONTINUED | OUTPATIENT
Start: 2022-06-28 | End: 2022-06-28 | Stop reason: HOSPADM

## 2022-06-28 RX ORDER — HEPARIN SODIUM 1000 [USP'U]/ML
INJECTION, SOLUTION INTRAVENOUS; SUBCUTANEOUS AS NEEDED
Status: DISCONTINUED | OUTPATIENT
Start: 2022-06-28 | End: 2022-06-28 | Stop reason: SURG

## 2022-06-28 RX ORDER — FLUMAZENIL 0.1 MG/ML
0.2 INJECTION INTRAVENOUS AS NEEDED
Status: DISCONTINUED | OUTPATIENT
Start: 2022-06-28 | End: 2022-06-28 | Stop reason: HOSPADM

## 2022-06-28 RX ORDER — FAMOTIDINE 10 MG/ML
20 INJECTION, SOLUTION INTRAVENOUS ONCE
Status: COMPLETED | OUTPATIENT
Start: 2022-06-28 | End: 2022-06-28

## 2022-06-28 RX ORDER — LABETALOL HYDROCHLORIDE 5 MG/ML
5 INJECTION, SOLUTION INTRAVENOUS
Status: DISCONTINUED | OUTPATIENT
Start: 2022-06-28 | End: 2022-06-28 | Stop reason: HOSPADM

## 2022-06-28 RX ORDER — ACETAMINOPHEN 325 MG/1
650 TABLET ORAL ONCE AS NEEDED
Status: DISCONTINUED | OUTPATIENT
Start: 2022-06-28 | End: 2022-06-28 | Stop reason: HOSPADM

## 2022-06-28 RX ORDER — OXYCODONE AND ACETAMINOPHEN 7.5; 325 MG/1; MG/1
1 TABLET ORAL EVERY 4 HOURS PRN
Status: DISCONTINUED | OUTPATIENT
Start: 2022-06-28 | End: 2022-06-28 | Stop reason: HOSPADM

## 2022-06-28 RX ORDER — FENTANYL CITRATE 50 UG/ML
INJECTION, SOLUTION INTRAMUSCULAR; INTRAVENOUS AS NEEDED
Status: DISCONTINUED | OUTPATIENT
Start: 2022-06-28 | End: 2022-06-28 | Stop reason: SURG

## 2022-06-28 RX ORDER — SODIUM CHLORIDE, SODIUM LACTATE, POTASSIUM CHLORIDE, CALCIUM CHLORIDE 600; 310; 30; 20 MG/100ML; MG/100ML; MG/100ML; MG/100ML
9 INJECTION, SOLUTION INTRAVENOUS CONTINUOUS
Status: DISCONTINUED | OUTPATIENT
Start: 2022-06-28 | End: 2022-06-28 | Stop reason: HOSPADM

## 2022-06-28 RX ORDER — FENTANYL CITRATE 50 UG/ML
25 INJECTION, SOLUTION INTRAMUSCULAR; INTRAVENOUS
Status: DISCONTINUED | OUTPATIENT
Start: 2022-06-28 | End: 2022-06-28 | Stop reason: HOSPADM

## 2022-06-28 RX ORDER — SODIUM CHLORIDE 0.9 % (FLUSH) 0.9 %
3-10 SYRINGE (ML) INJECTION AS NEEDED
Status: DISCONTINUED | OUTPATIENT
Start: 2022-06-28 | End: 2022-06-28 | Stop reason: HOSPADM

## 2022-06-28 RX ORDER — PROMETHAZINE HYDROCHLORIDE 25 MG/1
25 SUPPOSITORY RECTAL ONCE AS NEEDED
Status: DISCONTINUED | OUTPATIENT
Start: 2022-06-28 | End: 2022-06-28 | Stop reason: HOSPADM

## 2022-06-28 RX ORDER — DIPHENHYDRAMINE HYDROCHLORIDE 50 MG/ML
12.5 INJECTION INTRAMUSCULAR; INTRAVENOUS
Status: DISCONTINUED | OUTPATIENT
Start: 2022-06-28 | End: 2022-06-28 | Stop reason: HOSPADM

## 2022-06-28 RX ORDER — HYDROCODONE BITARTRATE AND ACETAMINOPHEN 7.5; 325 MG/1; MG/1
1 TABLET ORAL ONCE AS NEEDED
Status: DISCONTINUED | OUTPATIENT
Start: 2022-06-28 | End: 2022-06-28 | Stop reason: HOSPADM

## 2022-06-28 RX ORDER — DIPHENHYDRAMINE HCL 25 MG
25 CAPSULE ORAL
Status: DISCONTINUED | OUTPATIENT
Start: 2022-06-28 | End: 2022-06-28 | Stop reason: HOSPADM

## 2022-06-28 RX ORDER — HYDROMORPHONE HYDROCHLORIDE 1 MG/ML
0.5 INJECTION, SOLUTION INTRAMUSCULAR; INTRAVENOUS; SUBCUTANEOUS
Status: DISCONTINUED | OUTPATIENT
Start: 2022-06-28 | End: 2022-06-28 | Stop reason: HOSPADM

## 2022-06-28 RX ORDER — HYDROCODONE BITARTRATE AND ACETAMINOPHEN 5; 325 MG/1; MG/1
1-2 TABLET ORAL EVERY 4 HOURS PRN
Qty: 20 TABLET | Refills: 0 | Status: SHIPPED | OUTPATIENT
Start: 2022-06-28 | End: 2022-07-31 | Stop reason: HOSPADM

## 2022-06-28 RX ORDER — PHENYLEPHRINE HYDROCHLORIDE 10 MG/ML
INJECTION INTRAVENOUS AS NEEDED
Status: DISCONTINUED | OUTPATIENT
Start: 2022-06-28 | End: 2022-06-28 | Stop reason: SURG

## 2022-06-28 RX ORDER — PROPOFOL 10 MG/ML
VIAL (ML) INTRAVENOUS CONTINUOUS PRN
Status: DISCONTINUED | OUTPATIENT
Start: 2022-06-28 | End: 2022-06-28 | Stop reason: SURG

## 2022-06-28 RX ORDER — PROPOFOL 10 MG/ML
VIAL (ML) INTRAVENOUS AS NEEDED
Status: DISCONTINUED | OUTPATIENT
Start: 2022-06-28 | End: 2022-06-28 | Stop reason: SURG

## 2022-06-28 RX ORDER — LIDOCAINE HYDROCHLORIDE 20 MG/ML
INJECTION, SOLUTION INFILTRATION; PERINEURAL AS NEEDED
Status: DISCONTINUED | OUTPATIENT
Start: 2022-06-28 | End: 2022-06-28 | Stop reason: SURG

## 2022-06-28 RX ADMIN — FAMOTIDINE 20 MG: 10 INJECTION, SOLUTION INTRAVENOUS at 07:51

## 2022-06-28 RX ADMIN — PHENYLEPHRINE HYDROCHLORIDE 100 MCG: 10 INJECTION, SOLUTION INTRAVENOUS at 08:58

## 2022-06-28 RX ADMIN — HEPARIN SODIUM 5000 UNITS: 1000 INJECTION INTRAVENOUS; SUBCUTANEOUS at 08:50

## 2022-06-28 RX ADMIN — IOPAMIDOL 141 ML: 510 INJECTION, SOLUTION INTRAVASCULAR at 09:37

## 2022-06-28 RX ADMIN — PHENYLEPHRINE HYDROCHLORIDE 100 MCG: 10 INJECTION, SOLUTION INTRAVENOUS at 08:42

## 2022-06-28 RX ADMIN — PHENYLEPHRINE HYDROCHLORIDE 100 MCG: 10 INJECTION, SOLUTION INTRAVENOUS at 08:51

## 2022-06-28 RX ADMIN — CEFAZOLIN SODIUM 2 G: 2 INJECTION, SOLUTION INTRAVENOUS at 08:01

## 2022-06-28 RX ADMIN — FENTANYL CITRATE 25 MCG: 50 INJECTION INTRAMUSCULAR; INTRAVENOUS at 08:25

## 2022-06-28 RX ADMIN — PHENYLEPHRINE HYDROCHLORIDE 100 MCG: 10 INJECTION, SOLUTION INTRAVENOUS at 09:17

## 2022-06-28 RX ADMIN — PROPOFOL 20 MG: 10 INJECTION, EMULSION INTRAVENOUS at 08:35

## 2022-06-28 RX ADMIN — MIDAZOLAM 0.5 MG: 1 INJECTION INTRAMUSCULAR; INTRAVENOUS at 07:51

## 2022-06-28 RX ADMIN — PROPOFOL 30 MG: 10 INJECTION, EMULSION INTRAVENOUS at 08:22

## 2022-06-28 RX ADMIN — LIDOCAINE HYDROCHLORIDE 60 MG: 20 INJECTION, SOLUTION INFILTRATION; PERINEURAL at 08:22

## 2022-06-28 RX ADMIN — SODIUM CHLORIDE, POTASSIUM CHLORIDE, SODIUM LACTATE AND CALCIUM CHLORIDE 9 ML/HR: 600; 310; 30; 20 INJECTION, SOLUTION INTRAVENOUS at 07:09

## 2022-06-28 RX ADMIN — Medication 75 MCG/KG/MIN: at 08:22

## 2022-06-28 NOTE — BRIEF OP NOTE
ATHRECTOMY ILIAC, FEMORAL, TIBIAL ARTERY WITH POSSIBLE STENT  Progress Note    Sohan De La Torre  6/28/2022    Pre-op Diagnosis:   Left lower extremity ischemia with femoral stent thrombosis       Post-Op Diagnosis Codes:  Same with thrombosis through popliteal and tibial origin vessels.    Procedure/CPT® Codes:        Procedure(s):  AIF, Bilateral Lower Extremity Angiogram with selective catheterization left superficial femoral artery third order    Surgeon(s):  Sohan Burger MD    Anesthesia: Monitored Anesthesia Care    Staff:   Circulator: Constanza Evangelista RN  Radiology Technologist: Jasmine Guillaume  Scrub Person: Lauren Cintron  Assistant: Sandi Lacy CSA  Vascular Radiology Technician: Jemma Adame  Other: Taylor Hutchison RN  Assistant: Sandi Lacy CSA      Estimated Blood Loss: minimal    Urine Voided: * No values recorded between 6/28/2022  8:09 AM and 6/28/2022  9:34 AM *    Specimens:                None          Drains:   Urethral Catheter 16 Fr. (Active)       Findings: See dictation        Complications: None    Assistant: Sandi Lacy CSA  was responsible for performing the following activities: Wire and catheter management and their skilled assistance was necessary for the success of this case.    Sohan Burger MD     Date: 6/28/2022  Time: 09:41 EDT

## 2022-06-28 NOTE — DISCHARGE INSTRUCTIONS
[facility]  Fredo Joe, Mike Burger Rachel, Akhil Joyce Vinyard  [address]  [place] [address]  [phone]      Discharge Instructions for Angiogram    Go home, rest and take it easy today.      You may experience some dizziness or memory loss from the anesthesia.  This may last for the next 24 hours.  Someone should plan on staying with you for the first 24 hours for your safety.    Do not make any important legal decisions or sign any legal papers for the next 24 hours.      Eat and drink lightly today.  Start off with liquids, jello, soup, crackers or other bland foods at first. You may advance your diet tomorrow as tolerated as long as you do not experience any nausea or vomiting.    You may resume routine medications including blood thinners. However, Glucophage should be not be started for 72 hours after the dye is given.      No lifting over 10 pounds and no strenuous activity for the next 2-3 days.    Try not to strain or bear down when your bowels move or when you empty your bladder.    Limit going up and down steps for 2 days.    No driving for the remainder of the day.  You may resume limited driving tomorrow if necessary.      You may shower tomorrow.  No bath or hot tubs for at least 2 days after the procedure.          Leave the pressure dressing on until tomorrow morning.  You may then take this off, as well as the small see through dressing with gauze tomorrow.  If it doesn’t come off easily, do not pull this off.  If it is stuck, shower and let the warm water loosen it before removal.       Check your groin dressing regularly for bleeding through the dressing (under the pressure dressing).  A small amount of blood contained by the gauze is normal; the whole dressing should not be filled with blood or leaking out under the sides.     If you experience bleeding through the gauze/clear sticky dressing, lay flat and have someone apply direct  pressure for 15 minutes.  If bleeding has stopped after this, you may put a clean gauze and tape over the area.  Continue to lie flat for 1-2 hours.  If bleeding continues after 15 minutes of pressure, call us at the number listed above.  There is an MD available after hours.      If you experience heavy bleeding or large swelling, continue to hold firm pressure and              call 911.  Do not call the MD on call.      If you experience pain or discomfort you may take Tylenol or Ibuprofen, whichever you normally use for minor discomfort, unless otherwise instructed.        If the MD gives you a prescription for narcotic pain pills (Tylenol #3, Vicodin, Hydrocodone, Oxycodone or Percocet), you cannot drive a vehicle or operate machinery while taking these.     Severe pain is not expected after this procedure.  If you experience severe pain, please call our office at [phone].     Remember to contact our office for any of the following:    Fever > 101 degrees  Severe pain that cannot be controlled by taking your pain pills  Severe nausea or vomiting   Significant bleeding of your incisions  Drainage that has a bad smell or is yellow or green in appearance  Any other questions or concerns

## 2022-06-28 NOTE — ANESTHESIA POSTPROCEDURE EVALUATION
Patient: Sohan De La Torre    Procedure Summary     Date: 06/28/22 Room / Location: [facility] OR 18 [facility] / [facility] HYBRID OR 18/19    Anesthesia Start: 0809 Anesthesia Stop: 0949    Procedure: AIF, Bilateral Lower Extremity Angiogram (Left ) Diagnosis:     Surgeons: Sohan Burger MD Provider: Guido Cobian MD    Anesthesia Type: MAC ASA Status: 4          Anesthesia Type: MAC    Vitals  Vitals Value Taken Time   /86 06/28/22 1243   Temp     Pulse 47 06/28/22 1246   Resp 16 06/28/22 1215   SpO2 96 % 06/28/22 1246   Vitals shown include unvalidated device data.        Post Anesthesia Care and Evaluation    Patient location during evaluation: PACU  Patient participation: complete - patient participated  Level of consciousness: awake and alert  Pain management: adequate    Airway patency: patent  Anesthetic complications: No anesthetic complications    Cardiovascular status: acceptable  Respiratory status: acceptable  Hydration status: acceptable    Comments: --------------------            06/28/22               1215     --------------------   BP:       144/67     Pulse:      60       Resp:       16       Temp:                SpO2:      97%      --------------------

## 2022-06-28 NOTE — OP NOTE
Operative Note  Date of Admission:  6/28/2022  OR Date: 6/28/2022    Pre-op Diagnosis:   Left leg ischemia with thrombosis of left femoral-popliteal arteries    Post-Op Diagnosis Codes:  Same    Procedure:   1) duplex directed access with aortoiliofemoral arteriogram and bilateral lower extremity runoff.    Surgeon: Marlo Burger MD    Assistant: Sandi RODRIGUEZ CSA and they provided critical assistance during the case including suctioning, exposure, retraction, and reduction of blood loss.    Anesthesia: Monitored Anesthesia Care    Staff:   Circulator: Constanza Evangelista RN  Radiology Technologist: Jasmine Guillaume  Scrub Person: Lauren Cintron  Assistant: Sandi Lacy CSA  Vascular Radiology Technician: Jemma Adame  Other: Taylor Hutchison RN    Estimated Blood Loss: minimal    Specimens: * No orders in the log *     Complications: None    Findings: See dictation    Indications:    The patient is an 81 y.o. male seen for evaluation of occluded femoral-popliteal stents based on evaluation in the office with MARILYN dropping to 0.42.  Intervention is indicated based on the severe claudication and early rest pain of the situation.  The patient understands risk benefits complications of the procedure and agrees he agrees to procedure       Procedure:    Patient was prepped and draped sterilely.  Under MAC sedation we used a duplex direction and local anesthetic to access the right common femoral artery passing a wire and the pigtail centrally pigtail.  Pigtail was positioned at multiple sites throughout the aorta for aortogram and aorto iliofemoral arteriogram.  Our IM catheter was then used to cross from right to left and select the SFA proximally which is still patent and perform angiogram down the leg.  6 [place] sheath was placed in the external iliac and further angiograms were performed with the profunda flows perfused.  Runoff views were performed and it was felt that attempts to open this  recurrent TASC D lesion would likely be short-lived if successful in all and there is a chance we would worsen his outflow by taking away valuable collaterals.  It was therefore felt that bypass is our better option.  Catheters and wires were removed and a minx closure device was used in the right groin.  Patient tolerated the procedure well.  5000 units of heparin to be given  and were not reversed    Radiographic Findings:  Angiographic findings include single renal arteries widely patent bilaterally.  Small aortic aneurysmal degeneration throughout the mid segment of the aorta with no significant stenoses at the distal aorta or proximal common iliac.  There is some calcification in the area but multiple views of this area show no flow limitation.  Bilateral common iliac arteries hypogastrics and external iliac arteries and common femoral arteries are widely patent with both profunda femoris arteries well perfused.  Right SFA artery is occluded at its origin without change.  Left SFA is patent initially and angiogram at this level confirms total occlusion of the SFA from the level of the stent placements down with no real reconstitution of the distal superficial femoral, popliteal or tibial peroneal trunk.  All these arteries are occluded with reconstitution of the peroneal artery only off of the large collaterals feeding from the profunda femoris artery.  Posterior tibial artery reconstitutes poorly and anterior tibial artery reconstitutes and is highly diseased and late.  Peroneal runoff begins at the mid calf in earnest.  Right leg reconstitutes the above-knee popliteal meaningfully within normal popliteal and peroneal runoff on the right to the ankle.  Some late flows noted of the anterior tibial and posterior tibial artery on the right but not significant.      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      Sohan Burger MD     Date: 6/28/2022  Time: 14:05 EDT

## 2022-06-28 NOTE — ANESTHESIA PREPROCEDURE EVALUATION
Anesthesia Evaluation     Patient summary reviewed and Nursing notes reviewed   NPO Solid Status: > 8 hours  NPO Liquid Status: > 8 hours           Airway   Mallampati: II  Neck ROM: full  No difficulty expected  Dental - normal exam     Pulmonary     breath sounds clear to auscultation  Cardiovascular     Rhythm: regular    (+) hypertension, valvular problems/murmurs AS, PVD, hyperlipidemia,       Neuro/Psych  (+) numbness, psychiatric history,    GI/Hepatic/Renal/Endo      Musculoskeletal     Abdominal    Substance History      OB/GYN          Other   arthritis,                      Anesthesia Plan    ASA 4     MAC     intravenous induction     Anesthetic plan, risks, benefits, and alternatives have been provided, discussed and informed consent has been obtained with: patient.        CODE STATUS:

## 2022-06-28 NOTE — PERIOPERATIVE NURSING NOTE
Spoke with Dr. Ring, notified irregularity in patient EKG rhythm. States no major changes from previous EKG in January. No new orders.

## 2022-07-27 ENCOUNTER — PRE-ADMISSION TESTING (OUTPATIENT)
Dept: PREADMISSION TESTING | Facility: HOSPITAL | Age: 82
End: 2022-07-27

## 2022-07-27 VITALS
HEART RATE: 50 BPM | SYSTOLIC BLOOD PRESSURE: 150 MMHG | RESPIRATION RATE: 20 BRPM | WEIGHT: 199 LBS | TEMPERATURE: 98.2 F | BODY MASS INDEX: 28.49 KG/M2 | DIASTOLIC BLOOD PRESSURE: 70 MMHG | HEIGHT: 70 IN | OXYGEN SATURATION: 95 %

## 2022-07-27 LAB
ANION GAP SERPL CALCULATED.3IONS-SCNC: 19.2 MMOL/L (ref 5–15)
BUN SERPL-MCNC: 15 MG/DL (ref 8–23)
BUN/CREAT SERPL: 14.3 (ref 7–25)
CALCIUM SPEC-SCNC: 9.1 MG/DL (ref 8.6–10.5)
CHLORIDE SERPL-SCNC: 102 MMOL/L (ref 98–107)
CO2 SERPL-SCNC: 20.8 MMOL/L (ref 22–29)
CREAT SERPL-MCNC: 1.05 MG/DL (ref 0.76–1.27)
DEPRECATED RDW RBC AUTO: 41.8 FL (ref 37–54)
EGFRCR SERPLBLD CKD-EPI 2021: 71.8 ML/MIN/1.73
ERYTHROCYTE [DISTWIDTH] IN BLOOD BY AUTOMATED COUNT: 13.2 % (ref 12.3–15.4)
GLUCOSE SERPL-MCNC: 147 MG/DL (ref 65–99)
HCT VFR BLD AUTO: 46.7 % (ref 37.5–51)
HGB BLD-MCNC: 15.5 G/DL (ref 13–17.7)
MCH RBC QN AUTO: 29 PG (ref 26.6–33)
MCHC RBC AUTO-ENTMCNC: 33.2 G/DL (ref 31.5–35.7)
MCV RBC AUTO: 87.5 FL (ref 79–97)
PLATELET # BLD AUTO: 296 10*3/MM3 (ref 140–450)
PMV BLD AUTO: 11.6 FL (ref 6–12)
POTASSIUM SERPL-SCNC: 3.6 MMOL/L (ref 3.5–5.2)
RBC # BLD AUTO: 5.34 10*6/MM3 (ref 4.14–5.8)
SARS-COV-2 ORF1AB RESP QL NAA+PROBE: NOT DETECTED
SODIUM SERPL-SCNC: 142 MMOL/L (ref 136–145)
WBC NRBC COR # BLD: 9.46 10*3/MM3 (ref 3.4–10.8)

## 2022-07-27 PROCEDURE — U0004 COV-19 TEST NON-CDC HGH THRU: HCPCS

## 2022-07-27 PROCEDURE — 80048 BASIC METABOLIC PNL TOTAL CA: CPT

## 2022-07-27 PROCEDURE — 85027 COMPLETE CBC AUTOMATED: CPT

## 2022-07-27 PROCEDURE — 36415 COLL VENOUS BLD VENIPUNCTURE: CPT

## 2022-07-27 PROCEDURE — C9803 HOPD COVID-19 SPEC COLLECT: HCPCS

## 2022-07-28 ENCOUNTER — ANESTHESIA EVENT (OUTPATIENT)
Dept: PERIOP | Facility: HOSPITAL | Age: 82
End: 2022-07-28

## 2022-07-29 ENCOUNTER — APPOINTMENT (OUTPATIENT)
Dept: GENERAL RADIOLOGY | Facility: HOSPITAL | Age: 82
End: 2022-07-29

## 2022-07-29 ENCOUNTER — ANESTHESIA (OUTPATIENT)
Dept: PERIOP | Facility: HOSPITAL | Age: 82
End: 2022-07-29

## 2022-07-29 ENCOUNTER — HOSPITAL ENCOUNTER (INPATIENT)
Facility: HOSPITAL | Age: 82
LOS: 2 days | Discharge: HOME HEALTH | End: 2022-07-31
Attending: SURGERY | Admitting: SURGERY

## 2022-07-29 DIAGNOSIS — I99.8 ISCHEMIA OF LEFT LOWER EXTREMITY: Primary | ICD-10-CM

## 2022-07-29 LAB
ABO GROUP BLD: NORMAL
BLD GP AB SCN SERPL QL: NEGATIVE
RH BLD: POSITIVE
T&S EXPIRATION DATE: NORMAL

## 2022-07-29 PROCEDURE — 25010000002 PHENYLEPHRINE 10 MG/ML SOLUTION: Performed by: NURSE ANESTHETIST, CERTIFIED REGISTERED

## 2022-07-29 PROCEDURE — 86900 BLOOD TYPING SEROLOGIC ABO: CPT

## 2022-07-29 PROCEDURE — 85347 COAGULATION TIME ACTIVATED: CPT

## 2022-07-29 PROCEDURE — 25010000002 CEFAZOLIN PER 500 MG: Performed by: SURGERY

## 2022-07-29 PROCEDURE — 25010000002 CEFAZOLIN IN DEXTROSE 2-4 GM/100ML-% SOLUTION: Performed by: NURSE ANESTHETIST, CERTIFIED REGISTERED

## 2022-07-29 PROCEDURE — 25010000002 HYDROMORPHONE PER 4 MG: Performed by: NURSE ANESTHETIST, CERTIFIED REGISTERED

## 2022-07-29 PROCEDURE — 25010000002 ONDANSETRON PER 1 MG: Performed by: NURSE ANESTHETIST, CERTIFIED REGISTERED

## 2022-07-29 PROCEDURE — P9041 ALBUMIN (HUMAN),5%, 50ML: HCPCS | Performed by: NURSE ANESTHETIST, CERTIFIED REGISTERED

## 2022-07-29 PROCEDURE — 25010000002 HYDRALAZINE PER 20 MG: Performed by: SURGERY

## 2022-07-29 PROCEDURE — 25010000002 HEPARIN (PORCINE) PER 1000 UNITS: Performed by: NURSE ANESTHETIST, CERTIFIED REGISTERED

## 2022-07-29 PROCEDURE — 0 HYDROMORPHONE HCL PF 50 MG/5ML SOLUTION: Performed by: SURGERY

## 2022-07-29 PROCEDURE — 25010000002 VANCOMYCIN 1 G RECONSTITUTED SOLUTION

## 2022-07-29 PROCEDURE — 93005 ELECTROCARDIOGRAM TRACING: CPT | Performed by: SURGERY

## 2022-07-29 PROCEDURE — 25010000002 PROTAMINE SULFATE PER 10 MG: Performed by: NURSE ANESTHETIST, CERTIFIED REGISTERED

## 2022-07-29 PROCEDURE — 25010000002 MIDAZOLAM PER 1 MG: Performed by: ANESTHESIOLOGY

## 2022-07-29 PROCEDURE — 25010000002 DEXAMETHASONE PER 1 MG: Performed by: NURSE ANESTHETIST, CERTIFIED REGISTERED

## 2022-07-29 PROCEDURE — 94640 AIRWAY INHALATION TREATMENT: CPT

## 2022-07-29 PROCEDURE — 86923 COMPATIBILITY TEST ELECTRIC: CPT

## 2022-07-29 PROCEDURE — 86901 BLOOD TYPING SEROLOGIC RH(D): CPT | Performed by: SURGERY

## 2022-07-29 PROCEDURE — 25010000002 CEFAZOLIN IN DEXTROSE 2-4 GM/100ML-% SOLUTION: Performed by: SURGERY

## 2022-07-29 PROCEDURE — 25010000002 HEPARIN (PORCINE) PER 1000 UNITS: Performed by: SURGERY

## 2022-07-29 PROCEDURE — 25010000002 PROPOFOL 10 MG/ML EMULSION: Performed by: NURSE ANESTHETIST, CERTIFIED REGISTERED

## 2022-07-29 PROCEDURE — 25010000002 FENTANYL CITRATE (PF) 50 MCG/ML SOLUTION: Performed by: NURSE ANESTHETIST, CERTIFIED REGISTERED

## 2022-07-29 PROCEDURE — 86850 RBC ANTIBODY SCREEN: CPT | Performed by: SURGERY

## 2022-07-29 PROCEDURE — 0 IOTHALAMATE 60 % SOLUTION: Performed by: SURGERY

## 2022-07-29 PROCEDURE — 25010000002 ALBUMIN HUMAN 5% PER 50 ML: Performed by: NURSE ANESTHETIST, CERTIFIED REGISTERED

## 2022-07-29 PROCEDURE — 041L09M BYPASS LEFT FEMORAL ARTERY TO PERONEAL ARTERY WITH AUTOLOGOUS VENOUS TISSUE, OPEN APPROACH: ICD-10-PCS | Performed by: SURGERY

## 2022-07-29 PROCEDURE — 06BQ0ZZ EXCISION OF LEFT SAPHENOUS VEIN, OPEN APPROACH: ICD-10-PCS | Performed by: SURGERY

## 2022-07-29 PROCEDURE — 86900 BLOOD TYPING SEROLOGIC ABO: CPT | Performed by: SURGERY

## 2022-07-29 PROCEDURE — 86901 BLOOD TYPING SEROLOGIC RH(D): CPT

## 2022-07-29 PROCEDURE — 94799 UNLISTED PULMONARY SVC/PX: CPT

## 2022-07-29 DEVICE — LIGACLIP MCA MULTIPLE CLIP APPLIERS, 20 SMALL CLIPS
Type: IMPLANTABLE DEVICE | Site: LEG | Status: FUNCTIONAL
Brand: LIGACLIP

## 2022-07-29 DEVICE — LIGACLIP MCA MULTIPLE CLIP APPLIERS, 20 MEDIUM CLIPS
Type: IMPLANTABLE DEVICE | Site: LEG | Status: FUNCTIONAL
Brand: LIGACLIP

## 2022-07-29 RX ORDER — ONDANSETRON 4 MG/1
4 TABLET, FILM COATED ORAL EVERY 6 HOURS PRN
Status: DISCONTINUED | OUTPATIENT
Start: 2022-07-29 | End: 2022-07-31 | Stop reason: HOSPADM

## 2022-07-29 RX ORDER — NITROGLYCERIN 0.4 MG/1
0.4 TABLET SUBLINGUAL
Status: DISCONTINUED | OUTPATIENT
Start: 2022-07-29 | End: 2022-07-31 | Stop reason: HOSPADM

## 2022-07-29 RX ORDER — FENTANYL CITRATE 50 UG/ML
50 INJECTION, SOLUTION INTRAMUSCULAR; INTRAVENOUS
Status: DISCONTINUED | OUTPATIENT
Start: 2022-07-29 | End: 2022-07-29 | Stop reason: HOSPADM

## 2022-07-29 RX ORDER — FLUMAZENIL 0.1 MG/ML
0.2 INJECTION INTRAVENOUS AS NEEDED
Status: DISCONTINUED | OUTPATIENT
Start: 2022-07-29 | End: 2022-07-29 | Stop reason: HOSPADM

## 2022-07-29 RX ORDER — DEXAMETHASONE SODIUM PHOSPHATE 10 MG/ML
INJECTION INTRAMUSCULAR; INTRAVENOUS AS NEEDED
Status: DISCONTINUED | OUTPATIENT
Start: 2022-07-29 | End: 2022-07-29 | Stop reason: SURG

## 2022-07-29 RX ORDER — EPHEDRINE SULFATE 50 MG/ML
5 INJECTION, SOLUTION INTRAVENOUS ONCE AS NEEDED
Status: DISCONTINUED | OUTPATIENT
Start: 2022-07-29 | End: 2022-07-29 | Stop reason: HOSPADM

## 2022-07-29 RX ORDER — MIDAZOLAM HYDROCHLORIDE 1 MG/ML
INJECTION INTRAMUSCULAR; INTRAVENOUS
Status: COMPLETED | OUTPATIENT
Start: 2022-07-29 | End: 2022-07-29

## 2022-07-29 RX ORDER — PROMETHAZINE HYDROCHLORIDE 25 MG/1
25 SUPPOSITORY RECTAL ONCE AS NEEDED
Status: DISCONTINUED | OUTPATIENT
Start: 2022-07-29 | End: 2022-07-29 | Stop reason: HOSPADM

## 2022-07-29 RX ORDER — LIDOCAINE HYDROCHLORIDE 20 MG/ML
INJECTION, SOLUTION INFILTRATION; PERINEURAL AS NEEDED
Status: DISCONTINUED | OUTPATIENT
Start: 2022-07-29 | End: 2022-07-29 | Stop reason: SURG

## 2022-07-29 RX ORDER — SODIUM CHLORIDE 0.9 % (FLUSH) 0.9 %
10 SYRINGE (ML) INJECTION EVERY 12 HOURS SCHEDULED
Status: DISCONTINUED | OUTPATIENT
Start: 2022-07-29 | End: 2022-07-29 | Stop reason: HOSPADM

## 2022-07-29 RX ORDER — PHENYLEPHRINE HYDROCHLORIDE 10 MG/ML
INJECTION INTRAVENOUS AS NEEDED
Status: DISCONTINUED | OUTPATIENT
Start: 2022-07-29 | End: 2022-07-29 | Stop reason: SURG

## 2022-07-29 RX ORDER — HEPARIN SODIUM 1000 [USP'U]/ML
INJECTION, SOLUTION INTRAVENOUS; SUBCUTANEOUS AS NEEDED
Status: DISCONTINUED | OUTPATIENT
Start: 2022-07-29 | End: 2022-07-29 | Stop reason: SURG

## 2022-07-29 RX ORDER — OXYCODONE AND ACETAMINOPHEN 7.5; 325 MG/1; MG/1
1 TABLET ORAL EVERY 4 HOURS PRN
Status: DISCONTINUED | OUTPATIENT
Start: 2022-07-29 | End: 2022-07-29 | Stop reason: HOSPADM

## 2022-07-29 RX ORDER — HYDRALAZINE HYDROCHLORIDE 20 MG/ML
5 INJECTION INTRAMUSCULAR; INTRAVENOUS
Status: DISCONTINUED | OUTPATIENT
Start: 2022-07-29 | End: 2022-07-29 | Stop reason: HOSPADM

## 2022-07-29 RX ORDER — ROCURONIUM BROMIDE 10 MG/ML
INJECTION, SOLUTION INTRAVENOUS AS NEEDED
Status: DISCONTINUED | OUTPATIENT
Start: 2022-07-29 | End: 2022-07-29 | Stop reason: SURG

## 2022-07-29 RX ORDER — HYDROMORPHONE HYDROCHLORIDE 1 MG/ML
0.5 INJECTION, SOLUTION INTRAMUSCULAR; INTRAVENOUS; SUBCUTANEOUS
Status: DISCONTINUED | OUTPATIENT
Start: 2022-07-29 | End: 2022-07-29 | Stop reason: HOSPADM

## 2022-07-29 RX ORDER — DIPHENHYDRAMINE HCL 25 MG
25 CAPSULE ORAL
Status: DISCONTINUED | OUTPATIENT
Start: 2022-07-29 | End: 2022-07-29 | Stop reason: HOSPADM

## 2022-07-29 RX ORDER — IBUPROFEN 600 MG/1
600 TABLET ORAL ONCE AS NEEDED
Status: DISCONTINUED | OUTPATIENT
Start: 2022-07-29 | End: 2022-07-29 | Stop reason: HOSPADM

## 2022-07-29 RX ORDER — HYDROMORPHONE HCL IN 0.9% NACL 10 MG/50ML
PATIENT CONTROLLED ANALGESIA SYRINGE INTRAVENOUS CONTINUOUS
Status: DISCONTINUED | OUTPATIENT
Start: 2022-07-29 | End: 2022-07-29

## 2022-07-29 RX ORDER — NALOXONE HCL 0.4 MG/ML
0.1 VIAL (ML) INJECTION
Status: DISCONTINUED | OUTPATIENT
Start: 2022-07-29 | End: 2022-07-31 | Stop reason: HOSPADM

## 2022-07-29 RX ORDER — ASPIRIN 81 MG/1
81 TABLET ORAL DAILY
Status: DISCONTINUED | OUTPATIENT
Start: 2022-07-29 | End: 2022-07-31 | Stop reason: HOSPADM

## 2022-07-29 RX ORDER — MIDAZOLAM HYDROCHLORIDE 1 MG/ML
0.5 INJECTION INTRAMUSCULAR; INTRAVENOUS
Status: DISCONTINUED | OUTPATIENT
Start: 2022-07-29 | End: 2022-07-29 | Stop reason: HOSPADM

## 2022-07-29 RX ORDER — PROPOFOL 10 MG/ML
VIAL (ML) INTRAVENOUS AS NEEDED
Status: DISCONTINUED | OUTPATIENT
Start: 2022-07-29 | End: 2022-07-29 | Stop reason: SURG

## 2022-07-29 RX ORDER — NALOXONE HCL 0.4 MG/ML
0.2 VIAL (ML) INJECTION AS NEEDED
Status: DISCONTINUED | OUTPATIENT
Start: 2022-07-29 | End: 2022-07-29 | Stop reason: HOSPADM

## 2022-07-29 RX ORDER — PROTAMINE SULFATE 10 MG/ML
INJECTION, SOLUTION INTRAVENOUS AS NEEDED
Status: DISCONTINUED | OUTPATIENT
Start: 2022-07-29 | End: 2022-07-29 | Stop reason: SURG

## 2022-07-29 RX ORDER — EPHEDRINE SULFATE 50 MG/ML
INJECTION, SOLUTION INTRAVENOUS AS NEEDED
Status: DISCONTINUED | OUTPATIENT
Start: 2022-07-29 | End: 2022-07-29 | Stop reason: SURG

## 2022-07-29 RX ORDER — SODIUM CHLORIDE 9 MG/ML
INJECTION, SOLUTION INTRAVENOUS CONTINUOUS PRN
Status: DISCONTINUED | OUTPATIENT
Start: 2022-07-29 | End: 2022-07-29 | Stop reason: SURG

## 2022-07-29 RX ORDER — ALBUMIN, HUMAN INJ 5% 5 %
SOLUTION INTRAVENOUS CONTINUOUS PRN
Status: DISCONTINUED | OUTPATIENT
Start: 2022-07-29 | End: 2022-07-29 | Stop reason: SURG

## 2022-07-29 RX ORDER — CEFAZOLIN SODIUM 2 G/100ML
2 INJECTION, SOLUTION INTRAVENOUS EVERY 8 HOURS
Status: COMPLETED | OUTPATIENT
Start: 2022-07-29 | End: 2022-07-30

## 2022-07-29 RX ORDER — FENTANYL CITRATE 50 UG/ML
25 INJECTION, SOLUTION INTRAMUSCULAR; INTRAVENOUS
Status: DISCONTINUED | OUTPATIENT
Start: 2022-07-29 | End: 2022-07-29 | Stop reason: HOSPADM

## 2022-07-29 RX ORDER — ACETAMINOPHEN 325 MG/1
650 TABLET ORAL EVERY 4 HOURS PRN
Status: DISCONTINUED | OUTPATIENT
Start: 2022-07-29 | End: 2022-07-31

## 2022-07-29 RX ORDER — CEFAZOLIN SODIUM 2 G/100ML
2 INJECTION, SOLUTION INTRAVENOUS ONCE
Status: COMPLETED | OUTPATIENT
Start: 2022-07-29 | End: 2022-07-29

## 2022-07-29 RX ORDER — GLYCOPYRROLATE 0.2 MG/ML
INJECTION INTRAMUSCULAR; INTRAVENOUS AS NEEDED
Status: DISCONTINUED | OUTPATIENT
Start: 2022-07-29 | End: 2022-07-29 | Stop reason: SURG

## 2022-07-29 RX ORDER — PROMETHAZINE HYDROCHLORIDE 25 MG/1
25 TABLET ORAL ONCE AS NEEDED
Status: DISCONTINUED | OUTPATIENT
Start: 2022-07-29 | End: 2022-07-29 | Stop reason: HOSPADM

## 2022-07-29 RX ORDER — ATORVASTATIN CALCIUM 20 MG/1
40 TABLET, FILM COATED ORAL NIGHTLY
Status: DISCONTINUED | OUTPATIENT
Start: 2022-07-29 | End: 2022-07-31 | Stop reason: HOSPADM

## 2022-07-29 RX ORDER — DIPHENHYDRAMINE HYDROCHLORIDE 50 MG/ML
12.5 INJECTION INTRAMUSCULAR; INTRAVENOUS
Status: DISCONTINUED | OUTPATIENT
Start: 2022-07-29 | End: 2022-07-29 | Stop reason: HOSPADM

## 2022-07-29 RX ORDER — HYDRALAZINE HYDROCHLORIDE 20 MG/ML
10 INJECTION INTRAMUSCULAR; INTRAVENOUS ONCE
Status: COMPLETED | OUTPATIENT
Start: 2022-07-29 | End: 2022-07-29

## 2022-07-29 RX ORDER — SODIUM CHLORIDE 9 MG/ML
125 INJECTION, SOLUTION INTRAVENOUS CONTINUOUS
Status: DISCONTINUED | OUTPATIENT
Start: 2022-07-29 | End: 2022-07-31

## 2022-07-29 RX ORDER — HYDROMORPHONE HCL 110MG/55ML
PATIENT CONTROLLED ANALGESIA SYRINGE INTRAVENOUS AS NEEDED
Status: DISCONTINUED | OUTPATIENT
Start: 2022-07-29 | End: 2022-07-29 | Stop reason: SURG

## 2022-07-29 RX ORDER — FENTANYL CITRATE 50 UG/ML
INJECTION, SOLUTION INTRAMUSCULAR; INTRAVENOUS AS NEEDED
Status: DISCONTINUED | OUTPATIENT
Start: 2022-07-29 | End: 2022-07-29 | Stop reason: SURG

## 2022-07-29 RX ORDER — HYDROCODONE BITARTRATE AND ACETAMINOPHEN 7.5; 325 MG/1; MG/1
1 TABLET ORAL ONCE AS NEEDED
Status: DISCONTINUED | OUTPATIENT
Start: 2022-07-29 | End: 2022-07-29 | Stop reason: HOSPADM

## 2022-07-29 RX ORDER — ONDANSETRON 2 MG/ML
4 INJECTION INTRAMUSCULAR; INTRAVENOUS EVERY 6 HOURS PRN
Status: DISCONTINUED | OUTPATIENT
Start: 2022-07-29 | End: 2022-07-31 | Stop reason: HOSPADM

## 2022-07-29 RX ORDER — ACETAMINOPHEN 500 MG
1000 TABLET ORAL ONCE
Status: COMPLETED | OUTPATIENT
Start: 2022-07-29 | End: 2022-07-29

## 2022-07-29 RX ORDER — ATENOLOL 25 MG/1
50 TABLET ORAL NIGHTLY
Status: DISCONTINUED | OUTPATIENT
Start: 2022-07-29 | End: 2022-07-30

## 2022-07-29 RX ORDER — SODIUM CHLORIDE 0.9 % (FLUSH) 0.9 %
10 SYRINGE (ML) INJECTION AS NEEDED
Status: DISCONTINUED | OUTPATIENT
Start: 2022-07-29 | End: 2022-07-29 | Stop reason: HOSPADM

## 2022-07-29 RX ORDER — IPRATROPIUM BROMIDE AND ALBUTEROL SULFATE 2.5; .5 MG/3ML; MG/3ML
3 SOLUTION RESPIRATORY (INHALATION)
Status: DISPENSED | OUTPATIENT
Start: 2022-07-29 | End: 2022-07-31

## 2022-07-29 RX ORDER — HYDROCHLOROTHIAZIDE 25 MG/1
25 TABLET ORAL NIGHTLY
Status: DISCONTINUED | OUTPATIENT
Start: 2022-07-29 | End: 2022-07-31 | Stop reason: HOSPADM

## 2022-07-29 RX ORDER — HEPARIN SODIUM 5000 [USP'U]/ML
5000 INJECTION, SOLUTION INTRAVENOUS; SUBCUTANEOUS EVERY 12 HOURS SCHEDULED
Status: DISCONTINUED | OUTPATIENT
Start: 2022-07-29 | End: 2022-07-31

## 2022-07-29 RX ORDER — SODIUM CHLORIDE, SODIUM LACTATE, POTASSIUM CHLORIDE, CALCIUM CHLORIDE 600; 310; 30; 20 MG/100ML; MG/100ML; MG/100ML; MG/100ML
9 INJECTION, SOLUTION INTRAVENOUS CONTINUOUS PRN
Status: DISCONTINUED | OUTPATIENT
Start: 2022-07-29 | End: 2022-07-31 | Stop reason: HOSPADM

## 2022-07-29 RX ORDER — ONDANSETRON 2 MG/ML
INJECTION INTRAMUSCULAR; INTRAVENOUS AS NEEDED
Status: DISCONTINUED | OUTPATIENT
Start: 2022-07-29 | End: 2022-07-29 | Stop reason: SURG

## 2022-07-29 RX ORDER — HYDROMORPHONE HCL IN 0.9% NACL 10 MG/50ML
PATIENT CONTROLLED ANALGESIA SYRINGE INTRAVENOUS CONTINUOUS
Status: DISCONTINUED | OUTPATIENT
Start: 2022-07-30 | End: 2022-07-31

## 2022-07-29 RX ORDER — CEFAZOLIN SODIUM 2 G/100ML
INJECTION, SOLUTION INTRAVENOUS AS NEEDED
Status: DISCONTINUED | OUTPATIENT
Start: 2022-07-29 | End: 2022-07-29 | Stop reason: SURG

## 2022-07-29 RX ORDER — LABETALOL HYDROCHLORIDE 5 MG/ML
5 INJECTION, SOLUTION INTRAVENOUS
Status: DISCONTINUED | OUTPATIENT
Start: 2022-07-29 | End: 2022-07-29 | Stop reason: HOSPADM

## 2022-07-29 RX ORDER — ONDANSETRON 2 MG/ML
4 INJECTION INTRAMUSCULAR; INTRAVENOUS ONCE AS NEEDED
Status: DISCONTINUED | OUTPATIENT
Start: 2022-07-29 | End: 2022-07-29 | Stop reason: HOSPADM

## 2022-07-29 RX ORDER — PROPOFOL 10 MG/ML
VIAL (ML) INTRAVENOUS AS NEEDED
Status: DISCONTINUED | OUTPATIENT
Start: 2022-07-29 | End: 2022-07-29

## 2022-07-29 RX ORDER — IPRATROPIUM BROMIDE AND ALBUTEROL SULFATE 2.5; .5 MG/3ML; MG/3ML
3 SOLUTION RESPIRATORY (INHALATION) ONCE AS NEEDED
Status: DISCONTINUED | OUTPATIENT
Start: 2022-07-29 | End: 2022-07-29 | Stop reason: HOSPADM

## 2022-07-29 RX ORDER — CLOPIDOGREL BISULFATE 75 MG/1
75 TABLET ORAL DAILY
Status: DISCONTINUED | OUTPATIENT
Start: 2022-07-29 | End: 2022-07-31 | Stop reason: HOSPADM

## 2022-07-29 RX ADMIN — EPHEDRINE SULFATE 5 MG: 50 INJECTION INTRAVENOUS at 10:29

## 2022-07-29 RX ADMIN — GLYCOPYRROLATE 0.2 MG: 0.2 INJECTION INTRAMUSCULAR; INTRAVENOUS at 09:44

## 2022-07-29 RX ADMIN — ROCURONIUM BROMIDE 30 MG: 50 INJECTION INTRAVENOUS at 08:32

## 2022-07-29 RX ADMIN — CLOPIDOGREL 75 MG: 75 TABLET, FILM COATED ORAL at 21:30

## 2022-07-29 RX ADMIN — ONDANSETRON 4 MG: 2 INJECTION INTRAMUSCULAR; INTRAVENOUS at 12:04

## 2022-07-29 RX ADMIN — SODIUM CHLORIDE 125 ML/HR: 9 INJECTION, SOLUTION INTRAVENOUS at 13:39

## 2022-07-29 RX ADMIN — SODIUM CHLORIDE, POTASSIUM CHLORIDE, SODIUM LACTATE AND CALCIUM CHLORIDE: 600; 310; 30; 20 INJECTION, SOLUTION INTRAVENOUS at 07:37

## 2022-07-29 RX ADMIN — ROCURONIUM BROMIDE 10 MG: 50 INJECTION INTRAVENOUS at 11:01

## 2022-07-29 RX ADMIN — HYDROMORPHONE HYDROCHLORIDE 0.5 MG: 1 INJECTION, SOLUTION INTRAMUSCULAR; INTRAVENOUS; SUBCUTANEOUS at 17:37

## 2022-07-29 RX ADMIN — PROPOFOL 25 MCG/KG/MIN: 10 INJECTION, EMULSION INTRAVENOUS at 07:55

## 2022-07-29 RX ADMIN — EPHEDRINE SULFATE 10 MG: 50 INJECTION INTRAVENOUS at 11:07

## 2022-07-29 RX ADMIN — IPRATROPIUM BROMIDE AND ALBUTEROL SULFATE 3 ML: .5; 3 SOLUTION RESPIRATORY (INHALATION) at 22:45

## 2022-07-29 RX ADMIN — ROCURONIUM BROMIDE 10 MG: 50 INJECTION INTRAVENOUS at 09:31

## 2022-07-29 RX ADMIN — CEFAZOLIN 2 G: 10 INJECTION, POWDER, FOR SOLUTION INTRAVENOUS at 07:29

## 2022-07-29 RX ADMIN — SODIUM CHLORIDE: 900 INJECTION, SOLUTION INTRAVENOUS at 08:11

## 2022-07-29 RX ADMIN — FENTANYL CITRATE 25 MCG: 50 INJECTION INTRAMUSCULAR; INTRAVENOUS at 09:24

## 2022-07-29 RX ADMIN — HYDROMORPHONE HYDROCHLORIDE 0.5 MG: 1 INJECTION, SOLUTION INTRAMUSCULAR; INTRAVENOUS; SUBCUTANEOUS at 16:21

## 2022-07-29 RX ADMIN — EPHEDRINE SULFATE 10 MG: 50 INJECTION INTRAVENOUS at 12:16

## 2022-07-29 RX ADMIN — ROCURONIUM BROMIDE 50 MG: 50 INJECTION INTRAVENOUS at 07:48

## 2022-07-29 RX ADMIN — PHENYLEPHRINE HYDROCHLORIDE 100 MCG: 10 INJECTION, SOLUTION INTRAVENOUS at 07:51

## 2022-07-29 RX ADMIN — FENTANYL CITRATE 25 MCG: 50 INJECTION INTRAMUSCULAR; INTRAVENOUS at 09:27

## 2022-07-29 RX ADMIN — CEFAZOLIN SODIUM 2 G: 2 INJECTION, SOLUTION INTRAVENOUS at 21:30

## 2022-07-29 RX ADMIN — HYDROMORPHONE HYDROCHLORIDE: 10 INJECTION, SOLUTION INTRAMUSCULAR; INTRAVENOUS; SUBCUTANEOUS at 13:20

## 2022-07-29 RX ADMIN — MIDAZOLAM 0.5 MG: 1 INJECTION INTRAMUSCULAR; INTRAVENOUS at 07:12

## 2022-07-29 RX ADMIN — ACETAMINOPHEN 1000 MG: 500 TABLET, FILM COATED ORAL at 06:59

## 2022-07-29 RX ADMIN — EPHEDRINE SULFATE 10 MG: 50 INJECTION INTRAVENOUS at 09:46

## 2022-07-29 RX ADMIN — ROCURONIUM BROMIDE 10 MG: 50 INJECTION INTRAVENOUS at 10:21

## 2022-07-29 RX ADMIN — CEFAZOLIN SODIUM 2 G: 2 INJECTION, SOLUTION INTRAVENOUS at 11:29

## 2022-07-29 RX ADMIN — HYDRALAZINE HYDROCHLORIDE 10 MG: 20 INJECTION INTRAMUSCULAR; INTRAVENOUS at 07:28

## 2022-07-29 RX ADMIN — SUGAMMADEX 200 MG: 100 INJECTION, SOLUTION INTRAVENOUS at 12:07

## 2022-07-29 RX ADMIN — PROPOFOL 200 MG: 10 INJECTION, EMULSION INTRAVENOUS at 07:48

## 2022-07-29 RX ADMIN — LIDOCAINE HYDROCHLORIDE 100 MG: 20 INJECTION, SOLUTION INFILTRATION; PERINEURAL at 07:48

## 2022-07-29 RX ADMIN — HYDROMORPHONE HYDROCHLORIDE 0.5 MG: 2 INJECTION, SOLUTION INTRAMUSCULAR; INTRAVENOUS; SUBCUTANEOUS at 12:11

## 2022-07-29 RX ADMIN — EPHEDRINE SULFATE 10 MG: 50 INJECTION INTRAVENOUS at 07:51

## 2022-07-29 RX ADMIN — DEXAMETHASONE SODIUM PHOSPHATE 10 MG: 10 INJECTION INTRAMUSCULAR; INTRAVENOUS at 07:55

## 2022-07-29 RX ADMIN — PROTAMINE SULFATE 40 MG: 10 INJECTION, SOLUTION INTRAVENOUS at 11:36

## 2022-07-29 RX ADMIN — PROTAMINE SULFATE 10 MG: 10 INJECTION, SOLUTION INTRAVENOUS at 12:34

## 2022-07-29 RX ADMIN — ASPIRIN 81 MG: 81 TABLET, COATED ORAL at 21:30

## 2022-07-29 RX ADMIN — HEPARIN SODIUM 5000 UNITS: 5000 INJECTION INTRAVENOUS; SUBCUTANEOUS at 21:30

## 2022-07-29 RX ADMIN — ALBUMIN HUMAN: 0.05 INJECTION, SOLUTION INTRAVENOUS at 11:57

## 2022-07-29 RX ADMIN — HYDROMORPHONE HYDROCHLORIDE 0.5 MG: 2 INJECTION, SOLUTION INTRAMUSCULAR; INTRAVENOUS; SUBCUTANEOUS at 12:21

## 2022-07-29 RX ADMIN — FENTANYL CITRATE 50 MCG: 50 INJECTION INTRAMUSCULAR; INTRAVENOUS at 14:26

## 2022-07-29 RX ADMIN — FENTANYL CITRATE 100 MCG: 50 INJECTION INTRAMUSCULAR; INTRAVENOUS at 07:43

## 2022-07-29 RX ADMIN — GLYCOPYRROLATE 0.2 MG: 0.2 INJECTION INTRAMUSCULAR; INTRAVENOUS at 12:16

## 2022-07-29 RX ADMIN — HEPARIN SODIUM 10000 UNITS: 1000 INJECTION INTRAVENOUS; SUBCUTANEOUS at 09:42

## 2022-07-29 RX ADMIN — ROCURONIUM BROMIDE 10 MG: 50 INJECTION INTRAVENOUS at 11:31

## 2022-07-29 RX ADMIN — FENTANYL CITRATE 50 MCG: 50 INJECTION INTRAMUSCULAR; INTRAVENOUS at 16:56

## 2022-07-29 RX ADMIN — HEPARIN SODIUM 5000 UNITS: 1000 INJECTION INTRAVENOUS; SUBCUTANEOUS at 10:27

## 2022-07-29 RX ADMIN — MIDAZOLAM 0.5 MG: 1 INJECTION INTRAMUSCULAR; INTRAVENOUS at 07:10

## 2022-07-29 RX ADMIN — SODIUM CHLORIDE, POTASSIUM CHLORIDE, SODIUM LACTATE AND CALCIUM CHLORIDE: 600; 310; 30; 20 INJECTION, SOLUTION INTRAVENOUS at 08:39

## 2022-07-29 RX ADMIN — ROCURONIUM BROMIDE 10 MG: 50 INJECTION INTRAVENOUS at 09:51

## 2022-07-29 RX ADMIN — ATORVASTATIN CALCIUM 40 MG: 20 TABLET, FILM COATED ORAL at 21:30

## 2022-07-29 RX ADMIN — ALBUMIN HUMAN: 0.05 INJECTION, SOLUTION INTRAVENOUS at 08:39

## 2022-07-29 NOTE — ANESTHESIA PREPROCEDURE EVALUATION
Anesthesia Evaluation     Patient summary reviewed   history of anesthetic complications: PONV  NPO Solid Status: > 6 hours  NPO Liquid Status: > 6 hours           Airway   Mallampati: II  TM distance: >3 FB  Neck ROM: full  Dental    (+) poor dentition    Comment: Multiple chipped and cracked teeth.       Pulmonary     breath sounds clear to auscultation  (+) a smoker Former,   (-) COPD, asthma, sleep apnea  Cardiovascular     Rhythm: regular  Rate: normal    (+) hypertension, valvular problems/murmurs AS, MS and MR, CHF Diastolic >=55%, PVD, hyperlipidemia,   (-) CAD, dysrhythmias, angina    ROS comment: Stress 1/2022 negative for ischemia    TTE 1/2022 w/LV hyperdynamic, Gr 1 diastolic dysfunction, mod AS, mild-mod MS/MR, nl RV fxn    Mets limited by LE pain. No SoA or CP    Neuro/Psych  (+) numbness, psychiatric history Anxiety,    (-) seizures, CVA  GI/Hepatic/Renal/Endo    (-) GERD, liver disease, no renal disease, diabetes, no thyroid disorder    Musculoskeletal     Abdominal    Substance History      OB/GYN          Other   arthritis,                    Anesthesia Plan    ASA 3     general and Kayli       Anesthetic plan, risks, benefits, and alternatives have been provided, discussed and informed consent has been obtained with: patient.        CODE STATUS:

## 2022-07-29 NOTE — ANESTHESIA PROCEDURE NOTES
Arterial Line      Patient location during procedure: pre-op   Line placed for hemodynamic monitoring and ABGs/Labs/ISTAT.  Performed By   Anesthesiologist: Morgan Butler MD  Preanesthetic Checklist  Completed: patient identified, IV checked, site marked, risks and benefits discussed, surgical consent, monitors and equipment checked, pre-op evaluation and timeout performed  Arterial Line Prep   Sterile Tech: cap, gloves, sterile barriers and mask  Prep: ChloraPrep  Patient monitoring: EKG, continuous pulse oximetry and blood pressure monitoring  Arterial Line Procedure   Laterality:left  Location:  radial artery  Catheter size: 20 G   Guidance: ultrasound guided  PROCEDURE NOTE/ULTRASOUND INTERPRETATION.  Using ultrasound guidance the potential vascular sites for insertion of the catheter were visualized to determine the patency of the vessel to be used for vascular access.  After selecting the appropriate site for insertion, the needle was visualized under ultrasound being inserted into the radial artery, followed by ultrasound confirmation of wire and catheter placement. There were no abnormalities seen on ultrasound; an image was taken; and the patient tolerated the procedure with no complications.   Number of attempts: 2  Successful placement: yes  Post Assessment   Dressing Type: wrist guard applied, secured with tape and occlusive dressing applied.   Complications no  Circ/Move/Sens Assessment: normal and unchanged.   Patient Tolerance: patient tolerated the procedure well with no apparent complications

## 2022-07-29 NOTE — ANESTHESIA POSTPROCEDURE EVALUATION
Patient: Sohan De La Torre    Procedure Summary     Date: 07/29/22 Room / Location: [facility] OR [address] [place] [address] MAIN OR    Anesthesia Start: 0737 Anesthesia Stop: 1302    Procedure: LEFT FEM PERONEAL TIBIAL BYPASS WITH INSITU GREATER SAPHENPOUS VEIN (Left Leg Lower) Diagnosis:     Surgeons: Sohan Burger MD Provider: Morgan Butler MD    Anesthesia Type: general, Kayli ASA Status: 3          Anesthesia Type: general, Kayli    Vitals  Vitals Value Taken Time   /61 07/29/22 1456   Temp 36.4 °C (97.5 °F) 07/29/22 1255   Pulse 51 07/29/22 1459   Resp 16 07/29/22 1455   SpO2 92 % 07/29/22 1459   Vitals shown include unvalidated device data.        Post Anesthesia Care and Evaluation    Patient location during evaluation: PACU  Patient participation: complete - patient participated  Level of consciousness: awake  Pain management: satisfactory to patient    Airway patency: patent  Anesthetic complications: No anesthetic complications  PONV Status: controlled  Cardiovascular status: acceptable  Respiratory status: acceptable  Hydration status: acceptable

## 2022-07-29 NOTE — ANESTHESIA PROCEDURE NOTES
Airway  Urgency: elective    Date/Time: 7/29/2022 7:53 AM  Airway not difficult    General Information and Staff    Patient location during procedure: OR  Anesthesiologist: Morgan Butler MD  CRNA/CAA: Mirtha Oropeza CRNA    Indications and Patient Condition  Indications for airway management: airway protection    Preoxygenated: yes  MILS maintained throughout  Mask difficulty assessment: 2 - vent by mask + OA or adjuvant +/- NMBA    Final Airway Details  Final airway type: endotracheal airway      Successful airway: ETT  Cuffed: yes   Successful intubation technique: direct laryngoscopy  Facilitating devices/methods: intubating stylet and cricoid pressure  Endotracheal tube insertion site: oral  Blade: Shepard  Blade size: 2  ETT size (mm): 8.0  Cormack-Lehane Classification: grade I - full view of glottis  Placement verified by: chest auscultation and capnometry   Cuff volume (mL): 10  Measured from: teeth  ETT/EBT  to teeth (cm): 22  Number of attempts at approach: 1  Assessment: lips, teeth, and gum same as pre-op and atraumatic intubation

## 2022-07-30 LAB
ACT BLD: 132 SECONDS (ref 82–152)
ACT BLD: 237 SECONDS (ref 82–152)
ACT BLD: 271 SECONDS (ref 82–152)
ACT BLD: 283 SECONDS (ref 82–152)
ACT BLD: 294 SECONDS (ref 82–152)
ACT BLD: 98 SECONDS (ref 82–152)
ANION GAP SERPL CALCULATED.3IONS-SCNC: 11 MMOL/L (ref 5–15)
BASOPHILS # BLD AUTO: 0.01 10*3/MM3 (ref 0–0.2)
BASOPHILS NFR BLD AUTO: 0.1 % (ref 0–1.5)
BUN SERPL-MCNC: 21 MG/DL (ref 8–23)
BUN/CREAT SERPL: 22.6 (ref 7–25)
CALCIUM SPEC-SCNC: 8 MG/DL (ref 8.6–10.5)
CHLORIDE SERPL-SCNC: 110 MMOL/L (ref 98–107)
CO2 SERPL-SCNC: 19 MMOL/L (ref 22–29)
CREAT SERPL-MCNC: 0.93 MG/DL (ref 0.76–1.27)
DEPRECATED RDW RBC AUTO: 43 FL (ref 37–54)
EGFRCR SERPLBLD CKD-EPI 2021: 83 ML/MIN/1.73
EOSINOPHIL # BLD AUTO: 0 10*3/MM3 (ref 0–0.4)
EOSINOPHIL NFR BLD AUTO: 0 % (ref 0.3–6.2)
ERYTHROCYTE [DISTWIDTH] IN BLOOD BY AUTOMATED COUNT: 13.5 % (ref 12.3–15.4)
GLUCOSE SERPL-MCNC: 164 MG/DL (ref 65–99)
HCT VFR BLD AUTO: 34.4 % (ref 37.5–51)
HGB BLD-MCNC: 11.3 G/DL (ref 13–17.7)
IMM GRANULOCYTES # BLD AUTO: 0.06 10*3/MM3 (ref 0–0.05)
IMM GRANULOCYTES NFR BLD AUTO: 0.4 % (ref 0–0.5)
LYMPHOCYTES # BLD AUTO: 0.69 10*3/MM3 (ref 0.7–3.1)
LYMPHOCYTES NFR BLD AUTO: 4.6 % (ref 19.6–45.3)
MCH RBC QN AUTO: 28.9 PG (ref 26.6–33)
MCHC RBC AUTO-ENTMCNC: 32.8 G/DL (ref 31.5–35.7)
MCV RBC AUTO: 88 FL (ref 79–97)
MONOCYTES # BLD AUTO: 1.46 10*3/MM3 (ref 0.1–0.9)
MONOCYTES NFR BLD AUTO: 9.7 % (ref 5–12)
NEUTROPHILS NFR BLD AUTO: 12.89 10*3/MM3 (ref 1.7–7)
NEUTROPHILS NFR BLD AUTO: 85.2 % (ref 42.7–76)
NRBC BLD AUTO-RTO: 0 /100 WBC (ref 0–0.2)
PLATELET # BLD AUTO: 196 10*3/MM3 (ref 140–450)
PMV BLD AUTO: 11.4 FL (ref 6–12)
POTASSIUM SERPL-SCNC: 3.9 MMOL/L (ref 3.5–5.2)
RBC # BLD AUTO: 3.91 10*6/MM3 (ref 4.14–5.8)
SODIUM SERPL-SCNC: 140 MMOL/L (ref 136–145)
WBC NRBC COR # BLD: 15.11 10*3/MM3 (ref 3.4–10.8)

## 2022-07-30 PROCEDURE — 93005 ELECTROCARDIOGRAM TRACING: CPT | Performed by: SURGERY

## 2022-07-30 PROCEDURE — 85025 COMPLETE CBC W/AUTO DIFF WBC: CPT | Performed by: SURGERY

## 2022-07-30 PROCEDURE — 94664 DEMO&/EVAL PT USE INHALER: CPT

## 2022-07-30 PROCEDURE — 97530 THERAPEUTIC ACTIVITIES: CPT

## 2022-07-30 PROCEDURE — 97162 PT EVAL MOD COMPLEX 30 MIN: CPT

## 2022-07-30 PROCEDURE — 80048 BASIC METABOLIC PNL TOTAL CA: CPT | Performed by: SURGERY

## 2022-07-30 PROCEDURE — 25010000002 HEPARIN (PORCINE) PER 1000 UNITS: Performed by: SURGERY

## 2022-07-30 PROCEDURE — 99222 1ST HOSP IP/OBS MODERATE 55: CPT | Performed by: INTERNAL MEDICINE

## 2022-07-30 PROCEDURE — 94799 UNLISTED PULMONARY SVC/PX: CPT

## 2022-07-30 PROCEDURE — 25010000002 CEFAZOLIN IN DEXTROSE 2-4 GM/100ML-% SOLUTION: Performed by: SURGERY

## 2022-07-30 RX ORDER — LORAZEPAM 1 MG/1
2 TABLET ORAL EVERY 6 HOURS PRN
Status: DISCONTINUED | OUTPATIENT
Start: 2022-07-30 | End: 2022-07-31 | Stop reason: HOSPADM

## 2022-07-30 RX ORDER — LORAZEPAM 1 MG/1
1 TABLET ORAL EVERY 6 HOURS PRN
Status: DISCONTINUED | OUTPATIENT
Start: 2022-07-30 | End: 2022-07-31 | Stop reason: HOSPADM

## 2022-07-30 RX ORDER — LISINOPRIL 5 MG/1
5 TABLET ORAL
Status: DISCONTINUED | OUTPATIENT
Start: 2022-07-30 | End: 2022-07-31

## 2022-07-30 RX ORDER — AMLODIPINE BESYLATE 10 MG/1
10 TABLET ORAL
Status: DISCONTINUED | OUTPATIENT
Start: 2022-07-30 | End: 2022-07-31 | Stop reason: HOSPADM

## 2022-07-30 RX ADMIN — HEPARIN SODIUM 5000 UNITS: 5000 INJECTION INTRAVENOUS; SUBCUTANEOUS at 08:22

## 2022-07-30 RX ADMIN — AMLODIPINE BESYLATE 10 MG: 10 TABLET ORAL at 11:49

## 2022-07-30 RX ADMIN — IPRATROPIUM BROMIDE AND ALBUTEROL SULFATE 3 ML: .5; 3 SOLUTION RESPIRATORY (INHALATION) at 08:07

## 2022-07-30 RX ADMIN — HYDROCHLOROTHIAZIDE 25 MG: 25 TABLET ORAL at 20:06

## 2022-07-30 RX ADMIN — CLOPIDOGREL 75 MG: 75 TABLET, FILM COATED ORAL at 08:22

## 2022-07-30 RX ADMIN — IPRATROPIUM BROMIDE AND ALBUTEROL SULFATE 3 ML: .5; 3 SOLUTION RESPIRATORY (INHALATION) at 23:08

## 2022-07-30 RX ADMIN — HEPARIN SODIUM 5000 UNITS: 5000 INJECTION INTRAVENOUS; SUBCUTANEOUS at 20:06

## 2022-07-30 RX ADMIN — CEFAZOLIN SODIUM 2 G: 2 INJECTION, SOLUTION INTRAVENOUS at 05:34

## 2022-07-30 RX ADMIN — ASPIRIN 81 MG: 81 TABLET, COATED ORAL at 08:22

## 2022-07-30 RX ADMIN — LISINOPRIL 5 MG: 5 TABLET ORAL at 11:49

## 2022-07-30 RX ADMIN — LORAZEPAM 1 MG: 1 TABLET ORAL at 20:06

## 2022-07-30 RX ADMIN — ATORVASTATIN CALCIUM 40 MG: 20 TABLET, FILM COATED ORAL at 20:06

## 2022-07-31 ENCOUNTER — TRANSCRIBE ORDERS (OUTPATIENT)
Dept: HOME HEALTH SERVICES | Facility: HOME HEALTHCARE | Age: 82
End: 2022-07-31

## 2022-07-31 ENCOUNTER — APPOINTMENT (OUTPATIENT)
Dept: CARDIOLOGY | Facility: HOSPITAL | Age: 82
End: 2022-07-31

## 2022-07-31 ENCOUNTER — HOME HEALTH ADMISSION (OUTPATIENT)
Dept: HOME HEALTH SERVICES | Facility: HOME HEALTHCARE | Age: 82
End: 2022-07-31

## 2022-07-31 VITALS
BODY MASS INDEX: 28.63 KG/M2 | HEART RATE: 60 BPM | OXYGEN SATURATION: 100 % | SYSTOLIC BLOOD PRESSURE: 182 MMHG | TEMPERATURE: 97.1 F | HEIGHT: 70 IN | DIASTOLIC BLOOD PRESSURE: 66 MMHG | RESPIRATION RATE: 16 BRPM | WEIGHT: 199.96 LBS

## 2022-07-31 DIAGNOSIS — I44.1 HEART BLOCK AV SECOND DEGREE: ICD-10-CM

## 2022-07-31 DIAGNOSIS — I99.8 ISCHEMIA OF LEFT LOWER EXTREMITY: Primary | ICD-10-CM

## 2022-07-31 LAB
QT INTERVAL: 546 MS
QT INTERVAL: 568 MS
QT INTERVAL: 615 MS

## 2022-07-31 PROCEDURE — 94799 UNLISTED PULMONARY SVC/PX: CPT

## 2022-07-31 PROCEDURE — 25010000002 HEPARIN (PORCINE) PER 1000 UNITS: Performed by: SURGERY

## 2022-07-31 PROCEDURE — 94664 DEMO&/EVAL PT USE INHALER: CPT

## 2022-07-31 PROCEDURE — 93226 XTRNL ECG REC<48 HR SCAN A/R: CPT

## 2022-07-31 PROCEDURE — 99232 SBSQ HOSP IP/OBS MODERATE 35: CPT | Performed by: INTERNAL MEDICINE

## 2022-07-31 PROCEDURE — 93225 XTRNL ECG REC<48 HRS REC: CPT

## 2022-07-31 PROCEDURE — 93005 ELECTROCARDIOGRAM TRACING: CPT | Performed by: INTERNAL MEDICINE

## 2022-07-31 PROCEDURE — 94761 N-INVAS EAR/PLS OXIMETRY MLT: CPT

## 2022-07-31 RX ORDER — AMLODIPINE BESYLATE 10 MG/1
10 TABLET ORAL
Qty: 30 TABLET | Refills: 6 | Status: SHIPPED | OUTPATIENT
Start: 2022-08-01

## 2022-07-31 RX ORDER — HYDROCODONE BITARTRATE AND ACETAMINOPHEN 7.5; 325 MG/1; MG/1
1 TABLET ORAL EVERY 4 HOURS PRN
Qty: 30 TABLET | Refills: 0 | Status: SHIPPED | OUTPATIENT
Start: 2022-07-31 | End: 2022-08-07

## 2022-07-31 RX ORDER — LISINOPRIL 10 MG/1
10 TABLET ORAL
Qty: 30 TABLET | Refills: 6 | Status: SHIPPED | OUTPATIENT
Start: 2022-08-01

## 2022-07-31 RX ORDER — LISINOPRIL 5 MG/1
5 TABLET ORAL ONCE
Status: COMPLETED | OUTPATIENT
Start: 2022-07-31 | End: 2022-07-31

## 2022-07-31 RX ORDER — HYDROCODONE BITARTRATE AND ACETAMINOPHEN 7.5; 325 MG/1; MG/1
1 TABLET ORAL EVERY 6 HOURS PRN
Status: DISCONTINUED | OUTPATIENT
Start: 2022-07-31 | End: 2022-07-31 | Stop reason: HOSPADM

## 2022-07-31 RX ORDER — LORAZEPAM 1 MG/1
1 TABLET ORAL EVERY 6 HOURS PRN
Qty: 10 TABLET | Refills: 0 | Status: SHIPPED | OUTPATIENT
Start: 2022-07-31 | End: 2022-08-06

## 2022-07-31 RX ORDER — LISINOPRIL 10 MG/1
10 TABLET ORAL
Status: DISCONTINUED | OUTPATIENT
Start: 2022-08-01 | End: 2022-07-31 | Stop reason: HOSPADM

## 2022-07-31 RX ORDER — NICOTINE 21 MG/24HR
1 PATCH, TRANSDERMAL 24 HOURS TRANSDERMAL
Qty: 30 PATCH | Refills: 0 | Status: SHIPPED | OUTPATIENT
Start: 2022-07-31 | End: 2022-11-04 | Stop reason: HOSPADM

## 2022-07-31 RX ORDER — NICOTINE 21 MG/24HR
1 PATCH, TRANSDERMAL 24 HOURS TRANSDERMAL
Status: DISCONTINUED | OUTPATIENT
Start: 2022-07-31 | End: 2022-07-31 | Stop reason: HOSPADM

## 2022-07-31 RX ADMIN — LISINOPRIL 5 MG: 5 TABLET ORAL at 11:35

## 2022-07-31 RX ADMIN — HEPARIN SODIUM 5000 UNITS: 5000 INJECTION INTRAVENOUS; SUBCUTANEOUS at 08:25

## 2022-07-31 RX ADMIN — IPRATROPIUM BROMIDE AND ALBUTEROL SULFATE 3 ML: .5; 3 SOLUTION RESPIRATORY (INHALATION) at 08:11

## 2022-07-31 RX ADMIN — NICOTINE 1 PATCH: 21 PATCH, EXTENDED RELEASE TRANSDERMAL at 13:56

## 2022-07-31 RX ADMIN — ASPIRIN 81 MG: 81 TABLET, COATED ORAL at 08:25

## 2022-07-31 RX ADMIN — CLOPIDOGREL 75 MG: 75 TABLET, FILM COATED ORAL at 08:25

## 2022-07-31 RX ADMIN — AMLODIPINE BESYLATE 10 MG: 10 TABLET ORAL at 08:25

## 2022-07-31 RX ADMIN — LORAZEPAM 2 MG: 1 TABLET ORAL at 01:31

## 2022-07-31 RX ADMIN — LISINOPRIL 5 MG: 5 TABLET ORAL at 08:25

## 2022-08-01 ENCOUNTER — HOME HEALTH ADMISSION (OUTPATIENT)
Dept: HOME HEALTH SERVICES | Facility: HOME HEALTHCARE | Age: 82
End: 2022-08-01

## 2022-08-01 ENCOUNTER — HOME CARE VISIT (OUTPATIENT)
Dept: HOME HEALTH SERVICES | Facility: HOME HEALTHCARE | Age: 82
End: 2022-08-01

## 2022-08-01 ENCOUNTER — TRANSCRIBE ORDERS (OUTPATIENT)
Dept: HOME HEALTH SERVICES | Facility: HOME HEALTHCARE | Age: 82
End: 2022-08-01

## 2022-08-01 DIAGNOSIS — I99.8 ISCHEMIA OF LEFT LOWER EXTREMITY: Primary | ICD-10-CM

## 2022-08-01 DIAGNOSIS — R53.1 WEAKNESS: ICD-10-CM

## 2022-08-01 NOTE — PROGRESS NOTES
[facility] attempted to reach out for home health and spoke with Dtr Ny who states her father will be heading home to [place] today or tomorrow.  Jocy office will ashleigh as non-admit and new referral will be transcribed for [facility].  Patient will be at home address at [address] [place] [address] [place] [address].  Phoned Dr Burger's office and per MAGDY Rodriguez'd the home health for [facility] [place] office.

## 2022-08-01 NOTE — CASE MANAGEMENT/SOCIAL WORK
Case Management Discharge Note      Final Note: Pt discharged home with [facility]         Selected Continued Care - Discharged on 7/31/2022 Admission date: 7/29/2022 - Discharge disposition: Home-Health Care Svc    Destination    No services have been selected for the patient.              Durable Medical Equipment    No services have been selected for the patient.              Dialysis/Infusion    No services have been selected for the patient.              Home Medical Care     Service Provider Selected Services Address Phone Fax Patient Preferred    Critical access hospital Home [facility]  [facility] [address] [place] [id number] [phone] --          Therapy    No services have been selected for the patient.              Community Resources    No services have been selected for the patient.              Community & DME    No services have been selected for the patient.                  Transportation Services  Private: Car    Final Discharge Disposition Code: 06 - home with home health care

## 2022-08-02 LAB
BH BB BLOOD EXPIRATION DATE: NORMAL
BH BB BLOOD EXPIRATION DATE: NORMAL
BH BB BLOOD TYPE BARCODE: 5100
BH BB BLOOD TYPE BARCODE: 5100
BH BB DISPENSE STATUS: NORMAL
BH BB DISPENSE STATUS: NORMAL
BH BB PRODUCT CODE: NORMAL
BH BB PRODUCT CODE: NORMAL
BH BB UNIT NUMBER: NORMAL
BH BB UNIT NUMBER: NORMAL
CROSSMATCH INTERPRETATION: NORMAL
CROSSMATCH INTERPRETATION: NORMAL
UNIT  ABO: NORMAL
UNIT  ABO: NORMAL
UNIT  RH: NORMAL
UNIT  RH: NORMAL

## 2022-08-03 ENCOUNTER — HOME CARE VISIT (OUTPATIENT)
Dept: HOME HEALTH SERVICES | Facility: HOME HEALTHCARE | Age: 82
End: 2022-08-03

## 2022-08-03 VITALS
TEMPERATURE: 97.7 F | DIASTOLIC BLOOD PRESSURE: 78 MMHG | RESPIRATION RATE: 18 BRPM | SYSTOLIC BLOOD PRESSURE: 140 MMHG | OXYGEN SATURATION: 99 % | HEART RATE: 72 BPM

## 2022-08-03 LAB
MAXIMAL PREDICTED HEART RATE: 140 BPM
STRESS TARGET HR: 119 BPM

## 2022-08-03 PROCEDURE — G0151 HHCP-SERV OF PT,EA 15 MIN: HCPCS

## 2022-08-03 PROCEDURE — 93227 XTRNL ECG REC<48 HR R&I: CPT | Performed by: INTERNAL MEDICINE

## 2022-08-03 NOTE — HOME HEALTH
Pt referred from home health PT and SN sp L fem pop bypass. Skilled PT for strengthening, transfer training, balance and gait training with focus of care for L fem pop bypass graft.     Environment Lives alone in a bilevel house with 6 steps with rail to get in and out. Daughter assisting as needed. House is clutter free. Pt's bedroom and bathrrom on upper floor.     Plan for next visit: strengthening, transfer training, balance and gait training.

## 2022-08-04 ENCOUNTER — HOME CARE VISIT (OUTPATIENT)
Dept: HOME HEALTH SERVICES | Facility: HOME HEALTHCARE | Age: 82
End: 2022-08-04

## 2022-08-04 VITALS
OXYGEN SATURATION: 100 % | RESPIRATION RATE: 19 BRPM | HEART RATE: 57 BPM | DIASTOLIC BLOOD PRESSURE: 67 MMHG | TEMPERATURE: 97 F | SYSTOLIC BLOOD PRESSURE: 132 MMHG

## 2022-08-04 PROCEDURE — G0299 HHS/HOSPICE OF RN EA 15 MIN: HCPCS

## 2022-08-04 NOTE — HOME HEALTH
pt. with PVD surgery to left lower extremity.  Pt. has hx. of PVD, HTN, anxiety.  Pt. lives alone in multi story home with several steps.  Pt. ambulates with cane.  Pt. will need education on medication and usage, as well as wound assess.  SN set up Medi-planner for the week.  Medications were reviewed.  SN to follow once weekly.            Next SN visit: CP asssess, wound assess, pain assess, falls assess, medication review and mediplanner compliance      Dr. Tee Trejo- PCP

## 2022-08-10 ENCOUNTER — HOME CARE VISIT (OUTPATIENT)
Dept: HOME HEALTH SERVICES | Facility: HOME HEALTHCARE | Age: 82
End: 2022-08-10

## 2022-08-11 ENCOUNTER — HOME CARE VISIT (OUTPATIENT)
Dept: HOME HEALTH SERVICES | Facility: HOME HEALTHCARE | Age: 82
End: 2022-08-11

## 2022-08-15 DIAGNOSIS — I70.229 REST PAIN OF LOWER EXTREMITY DUE TO ATHEROSCLEROSIS: Primary | ICD-10-CM

## 2022-08-15 RX ORDER — HYDROCODONE BITARTRATE AND ACETAMINOPHEN 7.5; 325 MG/1; MG/1
1 TABLET ORAL EVERY 6 HOURS PRN
Qty: 40 TABLET | Refills: 0 | Status: SHIPPED | OUTPATIENT
Start: 2022-08-15 | End: 2022-11-04 | Stop reason: HOSPADM

## 2022-08-15 RX ORDER — LORAZEPAM 1 MG/1
1 TABLET ORAL EVERY 8 HOURS PRN
Qty: 30 TABLET | Refills: 0 | Status: SHIPPED | OUTPATIENT
Start: 2022-08-15

## 2022-08-17 ENCOUNTER — HOME CARE VISIT (OUTPATIENT)
Dept: HOME HEALTH SERVICES | Facility: HOME HEALTHCARE | Age: 82
End: 2022-08-17

## 2022-08-18 ENCOUNTER — HOME CARE VISIT (OUTPATIENT)
Dept: HOME HEALTH SERVICES | Facility: HOME HEALTHCARE | Age: 82
End: 2022-08-18

## 2022-08-19 ENCOUNTER — HOME CARE VISIT (OUTPATIENT)
Dept: HOME HEALTH SERVICES | Facility: HOME HEALTHCARE | Age: 82
End: 2022-08-19

## 2022-08-19 NOTE — CASE COMMUNICATION
Patient cancelld PT visit sheduled for this day (8/19/2022).    The patient additionally has requested discharge from home health services and further states that he has been seen for OP PT in [facility] recently.

## 2022-08-22 DIAGNOSIS — G62.9 NEUROPATHY: Primary | ICD-10-CM

## 2022-08-22 RX ORDER — GABAPENTIN 300 MG/1
300 CAPSULE ORAL 2 TIMES DAILY
Qty: 90 CAPSULE | Refills: 2 | Status: SHIPPED | OUTPATIENT
Start: 2022-08-22 | End: 2023-08-22

## 2022-08-24 ENCOUNTER — HOME CARE VISIT (OUTPATIENT)
Dept: HOME HEALTH SERVICES | Facility: HOME HEALTHCARE | Age: 82
End: 2022-08-24

## 2022-11-03 RX ORDER — HYDROCHLOROTHIAZIDE 25 MG/1
1 TABLET ORAL DAILY
COMMUNITY
Start: 2022-09-28

## 2022-11-04 ENCOUNTER — ANESTHESIA EVENT (OUTPATIENT)
Dept: PERIOP | Facility: HOSPITAL | Age: 82
End: 2022-11-04

## 2022-11-04 ENCOUNTER — APPOINTMENT (OUTPATIENT)
Dept: GENERAL RADIOLOGY | Facility: HOSPITAL | Age: 82
End: 2022-11-04

## 2022-11-04 ENCOUNTER — HOSPITAL ENCOUNTER (OUTPATIENT)
Facility: HOSPITAL | Age: 82
Setting detail: HOSPITAL OUTPATIENT SURGERY
Discharge: HOME | End: 2022-11-04
Attending: SURGERY | Admitting: SURGERY

## 2022-11-04 ENCOUNTER — ANESTHESIA (OUTPATIENT)
Dept: PERIOP | Facility: HOSPITAL | Age: 82
End: 2022-11-04

## 2022-11-04 VITALS
BODY MASS INDEX: 27.65 KG/M2 | RESPIRATION RATE: 18 BRPM | TEMPERATURE: 98.6 F | OXYGEN SATURATION: 98 % | WEIGHT: 193.12 LBS | SYSTOLIC BLOOD PRESSURE: 174 MMHG | HEART RATE: 60 BPM | HEIGHT: 70 IN | DIASTOLIC BLOOD PRESSURE: 74 MMHG

## 2022-11-04 DIAGNOSIS — I99.8 ISCHEMIA OF LEFT LOWER EXTREMITY: Primary | ICD-10-CM

## 2022-11-04 PROBLEM — T82.858A STENOSIS OF OTHER VASCULAR PROSTHETIC DEVICES, IMPLANTS AND GRAFTS, INITIAL ENCOUNTER: Status: ACTIVE | Noted: 2022-11-04

## 2022-11-04 PROBLEM — T82.599A FAILING VASCULAR BYPASS GRAFT: Status: ACTIVE | Noted: 2022-11-04

## 2022-11-04 LAB
ALBUMIN SERPL-MCNC: 4.3 G/DL (ref 3.5–5.2)
ALBUMIN/GLOB SERPL: 1.5 G/DL
ALP SERPL-CCNC: 125 U/L (ref 39–117)
ALT SERPL W P-5'-P-CCNC: 19 U/L (ref 1–41)
ANION GAP SERPL CALCULATED.3IONS-SCNC: 13.3 MMOL/L (ref 5–15)
APTT PPP: 34.3 SECONDS (ref 22.7–35.4)
AST SERPL-CCNC: 17 U/L (ref 1–40)
BASOPHILS # BLD AUTO: 0.04 10*3/MM3 (ref 0–0.2)
BASOPHILS NFR BLD AUTO: 0.4 % (ref 0–1.5)
BILIRUB SERPL-MCNC: 0.5 MG/DL (ref 0–1.2)
BUN SERPL-MCNC: 13 MG/DL (ref 8–23)
BUN/CREAT SERPL: 12.3 (ref 7–25)
CALCIUM SPEC-SCNC: 9.4 MG/DL (ref 8.6–10.5)
CHLORIDE SERPL-SCNC: 103 MMOL/L (ref 98–107)
CO2 SERPL-SCNC: 23.7 MMOL/L (ref 22–29)
CREAT SERPL-MCNC: 1.06 MG/DL (ref 0.76–1.27)
DEPRECATED RDW RBC AUTO: 42.6 FL (ref 37–54)
EGFRCR SERPLBLD CKD-EPI 2021: 70.9 ML/MIN/1.73
EOSINOPHIL # BLD AUTO: 0.09 10*3/MM3 (ref 0–0.4)
EOSINOPHIL NFR BLD AUTO: 0.9 % (ref 0.3–6.2)
ERYTHROCYTE [DISTWIDTH] IN BLOOD BY AUTOMATED COUNT: 13.3 % (ref 12.3–15.4)
GLOBULIN UR ELPH-MCNC: 2.9 GM/DL
GLUCOSE SERPL-MCNC: 134 MG/DL (ref 65–99)
HCT VFR BLD AUTO: 46.6 % (ref 37.5–51)
HGB BLD-MCNC: 15.6 G/DL (ref 13–17.7)
IMM GRANULOCYTES # BLD AUTO: 0.03 10*3/MM3 (ref 0–0.05)
IMM GRANULOCYTES NFR BLD AUTO: 0.3 % (ref 0–0.5)
INR PPP: 0.95 (ref 0.9–1.1)
LYMPHOCYTES # BLD AUTO: 1.46 10*3/MM3 (ref 0.7–3.1)
LYMPHOCYTES NFR BLD AUTO: 14.4 % (ref 19.6–45.3)
MCH RBC QN AUTO: 29.3 PG (ref 26.6–33)
MCHC RBC AUTO-ENTMCNC: 33.5 G/DL (ref 31.5–35.7)
MCV RBC AUTO: 87.6 FL (ref 79–97)
MONOCYTES # BLD AUTO: 0.89 10*3/MM3 (ref 0.1–0.9)
MONOCYTES NFR BLD AUTO: 8.8 % (ref 5–12)
NEUTROPHILS NFR BLD AUTO: 7.65 10*3/MM3 (ref 1.7–7)
NEUTROPHILS NFR BLD AUTO: 75.2 % (ref 42.7–76)
NRBC BLD AUTO-RTO: 0 /100 WBC (ref 0–0.2)
PLATELET # BLD AUTO: 262 10*3/MM3 (ref 140–450)
PMV BLD AUTO: 10.9 FL (ref 6–12)
POTASSIUM SERPL-SCNC: 3.6 MMOL/L (ref 3.5–5.2)
PROT SERPL-MCNC: 7.2 G/DL (ref 6–8.5)
PROTHROMBIN TIME: 12.7 SECONDS (ref 11.7–14.2)
RBC # BLD AUTO: 5.32 10*6/MM3 (ref 4.14–5.8)
SODIUM SERPL-SCNC: 140 MMOL/L (ref 136–145)
WBC NRBC COR # BLD: 10.16 10*3/MM3 (ref 3.4–10.8)

## 2022-11-04 PROCEDURE — C1894 INTRO/SHEATH, NON-LASER: HCPCS | Performed by: SURGERY

## 2022-11-04 PROCEDURE — 25010000002 PROPOFOL 10 MG/ML EMULSION: Performed by: NURSE ANESTHETIST, CERTIFIED REGISTERED

## 2022-11-04 PROCEDURE — C1760 CLOSURE DEV, VASC: HCPCS | Performed by: SURGERY

## 2022-11-04 PROCEDURE — 25010000002 PROTAMINE SULFATE PER 10 MG: Performed by: NURSE ANESTHETIST, CERTIFIED REGISTERED

## 2022-11-04 PROCEDURE — 25010000002 MIDAZOLAM PER 1 MG: Performed by: ANESTHESIOLOGY

## 2022-11-04 PROCEDURE — C1724 CATH, TRANS ATHEREC,ROTATION: HCPCS | Performed by: SURGERY

## 2022-11-04 PROCEDURE — C1887 CATHETER, GUIDING: HCPCS | Performed by: SURGERY

## 2022-11-04 PROCEDURE — 0 IOPAMIDOL PER 1 ML: Performed by: SURGERY

## 2022-11-04 PROCEDURE — 85025 COMPLETE CBC W/AUTO DIFF WBC: CPT | Performed by: SURGERY

## 2022-11-04 PROCEDURE — C2623 CATH, TRANSLUMIN, DRUG-COAT: HCPCS | Performed by: SURGERY

## 2022-11-04 PROCEDURE — 25010000002 CEFAZOLIN IN DEXTROSE 2-4 GM/100ML-% SOLUTION: Performed by: SURGERY

## 2022-11-04 PROCEDURE — C1889 IMPLANT/INSERT DEVICE, NOC: HCPCS | Performed by: SURGERY

## 2022-11-04 PROCEDURE — 85610 PROTHROMBIN TIME: CPT | Performed by: SURGERY

## 2022-11-04 PROCEDURE — 25010000002 ONDANSETRON PER 1 MG: Performed by: NURSE ANESTHETIST, CERTIFIED REGISTERED

## 2022-11-04 PROCEDURE — 85730 THROMBOPLASTIN TIME PARTIAL: CPT | Performed by: SURGERY

## 2022-11-04 PROCEDURE — C1769 GUIDE WIRE: HCPCS | Performed by: SURGERY

## 2022-11-04 PROCEDURE — 25010000002 HEPARIN (PORCINE) PER 1000 UNITS: Performed by: SURGERY

## 2022-11-04 PROCEDURE — 80053 COMPREHEN METABOLIC PANEL: CPT | Performed by: SURGERY

## 2022-11-04 PROCEDURE — 25010000002 HEPARIN (PORCINE) PER 1000 UNITS: Performed by: NURSE ANESTHETIST, CERTIFIED REGISTERED

## 2022-11-04 PROCEDURE — 0 LIDOCAINE 1 % SOLUTION 20 ML VIAL: Performed by: SURGERY

## 2022-11-04 DEVICE — IMPLANTABLE DEVICE: Type: IMPLANTABLE DEVICE | Site: LEG | Status: FUNCTIONAL

## 2022-11-04 RX ORDER — DIPHENHYDRAMINE HYDROCHLORIDE 50 MG/ML
12.5 INJECTION INTRAMUSCULAR; INTRAVENOUS
Status: DISCONTINUED | OUTPATIENT
Start: 2022-11-04 | End: 2022-11-04 | Stop reason: HOSPADM

## 2022-11-04 RX ORDER — OXYCODONE AND ACETAMINOPHEN 7.5; 325 MG/1; MG/1
1 TABLET ORAL EVERY 4 HOURS PRN
Status: DISCONTINUED | OUTPATIENT
Start: 2022-11-04 | End: 2022-11-04 | Stop reason: HOSPADM

## 2022-11-04 RX ORDER — ONDANSETRON 2 MG/ML
INJECTION INTRAMUSCULAR; INTRAVENOUS AS NEEDED
Status: DISCONTINUED | OUTPATIENT
Start: 2022-11-04 | End: 2022-11-04 | Stop reason: SURG

## 2022-11-04 RX ORDER — HEPARIN SODIUM 1000 [USP'U]/ML
INJECTION, SOLUTION INTRAVENOUS; SUBCUTANEOUS AS NEEDED
Status: DISCONTINUED | OUTPATIENT
Start: 2022-11-04 | End: 2022-11-04 | Stop reason: SURG

## 2022-11-04 RX ORDER — PROTAMINE SULFATE 10 MG/ML
INJECTION, SOLUTION INTRAVENOUS AS NEEDED
Status: DISCONTINUED | OUTPATIENT
Start: 2022-11-04 | End: 2022-11-04 | Stop reason: SURG

## 2022-11-04 RX ORDER — DIPHENHYDRAMINE HCL 25 MG
25 CAPSULE ORAL
Status: DISCONTINUED | OUTPATIENT
Start: 2022-11-04 | End: 2022-11-04 | Stop reason: HOSPADM

## 2022-11-04 RX ORDER — LIDOCAINE HYDROCHLORIDE 20 MG/ML
INJECTION, SOLUTION INFILTRATION; PERINEURAL AS NEEDED
Status: DISCONTINUED | OUTPATIENT
Start: 2022-11-04 | End: 2022-11-04 | Stop reason: SURG

## 2022-11-04 RX ORDER — FENTANYL CITRATE 50 UG/ML
50 INJECTION, SOLUTION INTRAMUSCULAR; INTRAVENOUS
Status: DISCONTINUED | OUTPATIENT
Start: 2022-11-04 | End: 2022-11-04 | Stop reason: HOSPADM

## 2022-11-04 RX ORDER — HYDROMORPHONE HYDROCHLORIDE 1 MG/ML
0.5 INJECTION, SOLUTION INTRAMUSCULAR; INTRAVENOUS; SUBCUTANEOUS
Status: DISCONTINUED | OUTPATIENT
Start: 2022-11-04 | End: 2022-11-04 | Stop reason: HOSPADM

## 2022-11-04 RX ORDER — SODIUM CHLORIDE 0.9 % (FLUSH) 0.9 %
10 SYRINGE (ML) INJECTION EVERY 12 HOURS SCHEDULED
Status: DISCONTINUED | OUTPATIENT
Start: 2022-11-04 | End: 2022-11-04 | Stop reason: HOSPADM

## 2022-11-04 RX ORDER — FAMOTIDINE 10 MG/ML
20 INJECTION, SOLUTION INTRAVENOUS ONCE
Status: COMPLETED | OUTPATIENT
Start: 2022-11-04 | End: 2022-11-04

## 2022-11-04 RX ORDER — CEFAZOLIN SODIUM 2 G/100ML
2 INJECTION, SOLUTION INTRAVENOUS ONCE
Status: COMPLETED | OUTPATIENT
Start: 2022-11-04 | End: 2022-11-04

## 2022-11-04 RX ORDER — HYDRALAZINE HYDROCHLORIDE 20 MG/ML
5 INJECTION INTRAMUSCULAR; INTRAVENOUS
Status: DISCONTINUED | OUTPATIENT
Start: 2022-11-04 | End: 2022-11-04 | Stop reason: HOSPADM

## 2022-11-04 RX ORDER — IBUPROFEN 600 MG/1
600 TABLET ORAL ONCE AS NEEDED
Status: DISCONTINUED | OUTPATIENT
Start: 2022-11-04 | End: 2022-11-04 | Stop reason: HOSPADM

## 2022-11-04 RX ORDER — EPHEDRINE SULFATE 50 MG/ML
5 INJECTION, SOLUTION INTRAVENOUS ONCE AS NEEDED
Status: DISCONTINUED | OUTPATIENT
Start: 2022-11-04 | End: 2022-11-04 | Stop reason: HOSPADM

## 2022-11-04 RX ORDER — PROMETHAZINE HYDROCHLORIDE 25 MG/1
25 TABLET ORAL ONCE AS NEEDED
Status: DISCONTINUED | OUTPATIENT
Start: 2022-11-04 | End: 2022-11-04 | Stop reason: HOSPADM

## 2022-11-04 RX ORDER — SODIUM CHLORIDE 0.9 % (FLUSH) 0.9 %
10 SYRINGE (ML) INJECTION AS NEEDED
Status: DISCONTINUED | OUTPATIENT
Start: 2022-11-04 | End: 2022-11-04 | Stop reason: HOSPADM

## 2022-11-04 RX ORDER — MIDAZOLAM HYDROCHLORIDE 1 MG/ML
0.5 INJECTION INTRAMUSCULAR; INTRAVENOUS
Status: COMPLETED | OUTPATIENT
Start: 2022-11-04 | End: 2022-11-04

## 2022-11-04 RX ORDER — PROPOFOL 10 MG/ML
VIAL (ML) INTRAVENOUS AS NEEDED
Status: DISCONTINUED | OUTPATIENT
Start: 2022-11-04 | End: 2022-11-04 | Stop reason: SURG

## 2022-11-04 RX ORDER — SODIUM CHLORIDE, SODIUM LACTATE, POTASSIUM CHLORIDE, CALCIUM CHLORIDE 600; 310; 30; 20 MG/100ML; MG/100ML; MG/100ML; MG/100ML
9 INJECTION, SOLUTION INTRAVENOUS CONTINUOUS
Status: DISCONTINUED | OUTPATIENT
Start: 2022-11-04 | End: 2022-11-04 | Stop reason: HOSPADM

## 2022-11-04 RX ORDER — HYDROCODONE BITARTRATE AND ACETAMINOPHEN 7.5; 325 MG/1; MG/1
1 TABLET ORAL ONCE AS NEEDED
Status: DISCONTINUED | OUTPATIENT
Start: 2022-11-04 | End: 2022-11-04 | Stop reason: HOSPADM

## 2022-11-04 RX ORDER — HYDROCODONE BITARTRATE AND ACETAMINOPHEN 5; 325 MG/1; MG/1
1 TABLET ORAL EVERY 4 HOURS PRN
Qty: 20 TABLET | Refills: 0 | Status: SHIPPED | OUTPATIENT
Start: 2022-11-04

## 2022-11-04 RX ORDER — LABETALOL HYDROCHLORIDE 5 MG/ML
5 INJECTION, SOLUTION INTRAVENOUS
Status: DISCONTINUED | OUTPATIENT
Start: 2022-11-04 | End: 2022-11-04 | Stop reason: HOSPADM

## 2022-11-04 RX ORDER — ONDANSETRON 2 MG/ML
4 INJECTION INTRAMUSCULAR; INTRAVENOUS ONCE AS NEEDED
Status: DISCONTINUED | OUTPATIENT
Start: 2022-11-04 | End: 2022-11-04 | Stop reason: HOSPADM

## 2022-11-04 RX ORDER — FLUMAZENIL 0.1 MG/ML
0.2 INJECTION INTRAVENOUS AS NEEDED
Status: DISCONTINUED | OUTPATIENT
Start: 2022-11-04 | End: 2022-11-04 | Stop reason: HOSPADM

## 2022-11-04 RX ORDER — NALOXONE HCL 0.4 MG/ML
0.2 VIAL (ML) INJECTION AS NEEDED
Status: DISCONTINUED | OUTPATIENT
Start: 2022-11-04 | End: 2022-11-04 | Stop reason: HOSPADM

## 2022-11-04 RX ORDER — PROMETHAZINE HYDROCHLORIDE 25 MG/1
25 SUPPOSITORY RECTAL ONCE AS NEEDED
Status: DISCONTINUED | OUTPATIENT
Start: 2022-11-04 | End: 2022-11-04 | Stop reason: HOSPADM

## 2022-11-04 RX ADMIN — IOPAMIDOL 176 ML: 510 INJECTION, SOLUTION INTRAVASCULAR at 13:02

## 2022-11-04 RX ADMIN — SODIUM CHLORIDE, POTASSIUM CHLORIDE, SODIUM LACTATE AND CALCIUM CHLORIDE 9 ML/HR: 600; 310; 30; 20 INJECTION, SOLUTION INTRAVENOUS at 11:16

## 2022-11-04 RX ADMIN — ONDANSETRON 4 MG: 2 INJECTION INTRAMUSCULAR; INTRAVENOUS at 13:15

## 2022-11-04 RX ADMIN — FAMOTIDINE 20 MG: 10 INJECTION INTRAVENOUS at 11:17

## 2022-11-04 RX ADMIN — LIDOCAINE HYDROCHLORIDE 50 MG: 20 INJECTION, SOLUTION INFILTRATION; PERINEURAL at 12:39

## 2022-11-04 RX ADMIN — MIDAZOLAM 0.5 MG: 1 INJECTION, SOLUTION INTRAMUSCULAR; INTRAVENOUS at 11:17

## 2022-11-04 RX ADMIN — PROPOFOL 50 MG: 10 INJECTION, EMULSION INTRAVENOUS at 12:39

## 2022-11-04 RX ADMIN — CEFAZOLIN SODIUM 2 G: 2 INJECTION, SOLUTION INTRAVENOUS at 12:31

## 2022-11-04 RX ADMIN — HEPARIN SODIUM 5000 UNITS: 1000 INJECTION INTRAVENOUS; SUBCUTANEOUS at 13:05

## 2022-11-04 RX ADMIN — PROPOFOL 180 MCG/KG/MIN: 10 INJECTION, EMULSION INTRAVENOUS at 12:41

## 2022-11-04 RX ADMIN — HEPARIN SODIUM 2500 UNITS: 1000 INJECTION INTRAVENOUS; SUBCUTANEOUS at 13:16

## 2022-11-04 RX ADMIN — PROTAMINE SULFATE 30 MG: 10 INJECTION, SOLUTION INTRAVENOUS at 13:55

## 2022-11-04 RX ADMIN — HEPARIN SODIUM 2500 UNITS: 1000 INJECTION INTRAVENOUS; SUBCUTANEOUS at 13:09

## 2022-11-04 RX ADMIN — MIDAZOLAM 0.5 MG: 1 INJECTION, SOLUTION INTRAMUSCULAR; INTRAVENOUS at 11:27

## 2022-11-04 NOTE — PERIOPERATIVE NURSING NOTE
Call placed to Dr. Burger to clarify medications. OK to continue ASA and Plavix starting today 11/4/22.

## 2022-11-04 NOTE — OP NOTE
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    Anesthesia: Monitored Anesthesia Care    Staff:   Circulator: Alyse Boyer RN; Odilia Alvarez RN  Laser Staff: Avelino Mccormick  Scrub Person: Lance Prieto  Assistant: Kari Luo  Vascular Radiology Technician: Tiffany De La Rosa Joseph    Estimated Blood Loss: 50 mL    Specimens:   Order Name Source Comment Collection Info Order Time   COMPREHENSIVE METABOLIC PANEL   Collected By: Marycruz Gauthier RN 11/4/2022 10:16 AM   PROTIME-INR   Collected By: Marycruz Gauthier RN 11/4/2022 10:16 AM   APTT   Collected By: Marycruz Gauthier RN 11/4/2022 10:16 AM        Complications: None    Findings: See dictated    Indications:    The patient is an 81 y.o. male seen for evaluation failing left femoral to peroneal bypass graft with in situ greater saphenous vein.  Patient understands risk benefits complications of this intervention and agrees to procedure.     Procedure:    Patient was prepped and draped sterilely.  Using duplex direction we accessed the right common femoral artery and passed a wire centrally.  Aortogram was performed and then catheter was pulled down to bifurcation and then aortoiliofemoral arteriogram was performed.  RM catheter was then used to direct the wire and catheter over the morning  left leg runoff was performed.  On a high-grade distal stenosis as well as a stealing branch proximally we placed a 6 Algerian by 65 cm sheath into the bypass graft and then cross the high-grade stenoses with angled Glidewire and quick cross catheter.  We exchanged for an 014 wire and passed a 1.4 mm Mindie laser atherectomy catheter over the horn and laser atherectomized proximal valve site at the knee.  Distally there is still high-grade disease was unclear if this was underfilling of the graft or scar and for the feeling graft because of poor vein conduit.  It was decided not to laser atherectomized the segment for fear of injuring the vein.  Instead we passed a 4 mm x 150 mm impact Admiral drug-eluting balloon and angioplasty the segment of vein for the entire length.  There is approximately 1-1/2 cm of balloon on each side of the total lesion length.  Patient had excellent response to the treatment with 3-minute balloon angioplasty at 10 samuel.  No dissection or complicating features are seen and there is wide patency to the graft.  Attention was then turned in the thigh to the stealing branch which was selected with a 035 wire and a Mckeon cross catheter was placed with angiogram confirming position.  I then placed an 8 0 35 detachable 4 mm x 5 cm helical hypercoiled coil embolization device into the stealing branch occluding his outflow.  Following this catheters were removed and the attempts at placement of Perclose device x2 in the right groin were unsuccessful due to calcification of the vessel.  Mynx closure device was then used with a 6 Algerian site on the right groin.  Reversal of the heparin effect was performed with 30 mg of protamine reversed and the 10,000 units of heparin was given.  Patient tolerated the procedure well.  And was sent to the recovery room stable    Radiographic Findings:  Angiographic findings show wide patency to the aorta with some ectasia versus early aneurysm changes.  Both renal  arteries are widely patent as are the common iliacs hypogastrics and external iliac arteries.  The right common femoral artery is mildly dilated large amount of plaquing good puncture site for closure device used.  The SFA is occluded on the right with a dominant profundus flow noted as visualized.  On the left side the patient has widely patent aortoiliac section down through the common femoral where there is wide patency in the good profunda femoris artery but the SFA is occluded just beyond its origin.  There is a widely patent femoral to peroneal bypass origin the flows down and is without disease to the level of the knee.  There is a large stealing branch medially at the high thigh.  From the knee down there is multiple segmental severe stenoses with what appears to be a valve cusp residually at the level just below the knee and the long segment stenoses not involving the anastomosis which is widely patent to the peroneal artery with the peroneal outflow down to the ankle reconstituting both the medial tarsal and the dorsalis pedis and anterior tibial artery collaterals.  Crossing the high-grade stenosis throughout the femoral peroneal bypass graft is performed and confirmed with angiogram the patient has an 014 wire placed with laser atherectomy of the proximal lesion performed at 30 x 30 and 40 x 40 power.  Moderate improvement is seen.  Subsequent balloon angioplasty of the entire segment stenosis vein from the knee down but not involving the anastomosis is performed with 4 mm x 150 mm drug-eluting balloon with excellent result and no residual stenosis or dissection seen.  Subsequent selective catheterization of the steely branch the mid thigh is performed and after confirming with angiogram coil embolization with the 4 mm x 5 cm Azur coil was performed.  At completion there is no complicating features seen patient has wide patency of flow down to the leg.    Active Hospital Problems    Diagnosis  POA   •  Stenosis of other vascular prosthetic devices, implants and grafts, initial encounter ([facility]) [T82.874C]  Unknown   • Failing vascular bypass graft [T82.725D]  Unknown      Resolved Hospital Problems   No resolved problems to display.      Sohan Burger MD     Date: 11/4/2022  Time: 16:59 EDT

## 2022-11-04 NOTE — ANESTHESIA POSTPROCEDURE EVALUATION
"Patient: Sohan De La Torre    Procedure Summary     Date: 11/04/22 Room / Location:  [facility] OR 18 INV /  [facility] OR 18/19    Anesthesia Start: 1233 Anesthesia Stop: 1425    Procedure: AORTO ILIAC FEMORAL LEFT LEG DRUG COATED BALLOON ANGIOPLASTY, LASER ATHERECTOMY, COIL EMBOLIZATION (Left) Diagnosis:     Surgeons: Sohan Burger MD Provider: Morgan Butler MD    Anesthesia Type: MAC ASA Status: 3          Anesthesia Type: MAC    Vitals  Vitals Value Taken Time   /74 11/04/22 1731   Temp     Pulse 60 11/04/22 1734   Resp 18 11/04/22 1730   SpO2 99 % 11/04/22 1734   Vitals shown include unvalidated device data.        Post Anesthesia Care and Evaluation    Patient location during evaluation: PACU  Patient participation: complete - patient cannot participate  Pain management: adequate    Airway patency: patent  Anesthetic complications: No anesthetic complications    Cardiovascular status: acceptable  Respiratory status: acceptable  Hydration status: acceptable    Comments: /74   Pulse 60   Temp 37 °C (98.6 °F) (Oral)   Resp 18   Ht 177.8 cm (70\")   Wt 87.6 kg (193 lb 2 oz)   SpO2 98%   BMI 27.71 kg/m²     Patient discharged before being seen by an Anesthesiologist.  No anesthetic complications noted per record.      "

## 2022-11-04 NOTE — BRIEF OP NOTE
ATHRECTOMY ILIAC, FEMORAL, TIBIAL ARTERY WITH POSSIBLE STENT  Progress Note    Sohan RAY Wil  11/4/2022    Pre-op Diagnosis:   Failing bypass graft       Post-Op Diagnosis Codes:  Same    Procedure/CPT® Codes:        Procedure(s):  AORTO ILIAC FEMORAL LEFT LEG DRUG COATED BALLOON ANGIOPLASTY, LASER ATHERECTOMY, COIL EMBOLIZATION        Surgeon(s):  Sohan Burger MD    Anesthesia: Monitored Anesthesia Care    Staff:   Circulator: Alyse Boyer RN; Odilia Alvarez RN  Laser Staff: Avelino Mccormick  Scrub Person: Lance Prieto  Assistant: Kari Luo  Vascular Radiology Technician: Tiffany De La Rosa Joseph  Assistant: Kari Luo      Estimated Blood Loss: 50 mL    Urine Voided: * No values recorded between 11/4/2022 12:33 PM and 11/4/2022  2:01 PM *    Specimens:                None          Drains:   [REMOVED] Urethral Catheter Silicone 16 Fr. (Removed)       Findings: See dictation        Complications: None    Assistant: Kari Luo  was responsible for performing the following activities: Wire and catheter management and their skilled assistance was necessary for the success of this case.    Sohan Burger MD     Date: 11/4/2022  Time: 14:03 EDT

## 2023-02-09 NOTE — ANESTHESIA PREPROCEDURE EVALUATION
Anesthesia Evaluation     Patient summary reviewed and Nursing notes reviewed   history of anesthetic complications: PONV  NPO Solid Status: > 6 hours  NPO Liquid Status: > 6 hours           Airway   Mallampati: II  TM distance: >3 FB  Neck ROM: full  no difficulty expected and No difficulty expected  Dental - normal exam     Pulmonary - normal exam    breath sounds clear to auscultation  (+) a smoker Current, sleep apnea,   (-) rhonchi, decreased breath sounds, wheezes, rales, stridor  Cardiovascular     NYHA Classification: I  ECG reviewed  Rhythm: irregular  Rate: abnormal    (+) hypertension, valvular problems/murmurs AS, PVD, hyperlipidemia,   (-) murmur, weak pulses, friction rub, systolic click, carotid bruits, JVD, peripheral edema      Neuro/Psych- negative ROS  GI/Hepatic/Renal/Endo - negative ROS     Musculoskeletal     (+) back pain,   Abdominal  - normal exam    Abdomen: soft.   Substance History - negative use     OB/GYN negative ob/gyn ROS         Other   arthritis,                      Anesthesia Plan    ASA 3     MAC     intravenous induction     Anesthetic plan, risks, benefits, and alternatives have been provided, discussed and informed consent has been obtained with: patient.        CODE STATUS:       
.

## 2023-06-15 ENCOUNTER — TRANSCRIBE ORDERS (OUTPATIENT)
Dept: ADMINISTRATIVE | Facility: HOSPITAL | Age: 83
End: 2023-06-15
Payer: MEDICARE

## 2023-06-15 DIAGNOSIS — H34.11 CENTRAL RETINAL ARTERY OCCLUSION OF RIGHT EYE: Primary | ICD-10-CM

## 2023-06-16 ENCOUNTER — PATIENT OUTREACH (OUTPATIENT)
Dept: OTHER | Facility: HOSPITAL | Age: 83
End: 2023-06-16
Payer: MEDICARE

## 2023-06-16 ENCOUNTER — HOSPITAL ENCOUNTER (OUTPATIENT)
Dept: CT IMAGING | Facility: HOSPITAL | Age: 83
Discharge: HOME | End: 2023-06-16
Payer: MEDICARE

## 2023-06-16 ENCOUNTER — TELEPHONE (OUTPATIENT)
Dept: SURGERY | Facility: CLINIC | Age: 83
End: 2023-06-16
Payer: MEDICARE

## 2023-06-16 DIAGNOSIS — H34.11 CENTRAL RETINAL ARTERY OCCLUSION OF RIGHT EYE: ICD-10-CM

## 2023-06-16 LAB — CREAT BLDA-MCNC: 1 MG/DL (ref 0.6–1.3)

## 2023-06-16 PROCEDURE — 70498 CT ANGIOGRAPHY NECK: CPT

## 2023-06-16 PROCEDURE — 25510000001 IOPAMIDOL PER 1 ML: Performed by: SURGERY

## 2023-06-16 PROCEDURE — 82565 ASSAY OF CREATININE: CPT

## 2023-06-16 PROCEDURE — 70496 CT ANGIOGRAPHY HEAD: CPT

## 2023-06-16 RX ADMIN — IOPAMIDOL 90 ML: 755 INJECTION, SOLUTION INTRAVENOUS at 09:09

## 2023-06-16 NOTE — TELEPHONE ENCOUNTER
Called patient regarding new patient appointment on 6/21 patient confirmed and stated understanding

## 2023-06-16 NOTE — PROGRESS NOTES
Call from Liza @ Dr. Burger's office [phone] with urgent referral to lung clinic. Patient had CTA today and found 3 cm solid right upper lobe lung mass.  He is having a procedure on Tuesday and can't be seen in the lung clinic. The next clinic day is Thursday afternoon.  Explained Dr. White has office hours Wednesday-they will fax the referral there instead.    Message to Dr. White and Chiara in his office about urgent referral. Needs appt Wednesday with Dr White if possible.   
Statement Selected

## 2023-06-16 NOTE — NURSING NOTE
0918: arrived in triage for stat hold & call post CTA head/neck.    1028: received call from Dr. Burger stating that patient can go home. Dr. Burger also stated he will call patient on phone today when he gets a chance.    1032: IV removed and patient ambulated independently to discharge.

## 2023-06-20 PROBLEM — M31.6 TEMPORAL ARTERITIS: Status: ACTIVE | Noted: 2023-06-20

## 2023-06-21 PROBLEM — R91.8 LUNG MASS: Status: ACTIVE | Noted: 2023-06-21

## 2023-07-19 NOTE — H&P (VIEW-ONLY)
"Chief Complaint  Right upper lobe pulmonary mass.    Subjective        Sohan De La Torre presents to [facility] THORACIC SURGERY  History of Present Illness  Mr. De La Torre is a pleasant 82-year-old gentleman who presents today in regards to a right upper lobe pulmonary mass.  He most recently underwent an ion bronchoscopy with biopsy of this lesion.  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  At the same time he underwent mediastinal staging and his mediastinal lymph node that was noted was negative for malignancy.  He is undergone an extensive work-up due to the size of his right upper lobe lesion which included an MRI of the brain which also was negative for signs of malignancy.  His PET scan also did not show any distant areas of metastatic disease.  He has undergone pulmonary function test.  His pulmonary function test are adequate for primary resection  FEV1: 58% predicted  DLCO: 69% predicted  He denies any symptoms.  He denies any fever, chills and or cough.  He denies any hemoptysis.  Denies any unintentional weight loss.  Objective   Vital Signs:  BP (!) 158/102 (BP Location: Left arm, Patient Position: Sitting, Cuff Size: Adult)   Pulse 110   Ht 177.8 cm (70\")   Wt 86.2 kg (190 lb)   SpO2 99%   BMI 27.26 kg/mý   Estimated body mass index is 27.26 kg/mý as calculated from the following:    Height as of this encounter: 177.8 cm (70\").    Weight as of this encounter: 86.2 kg (190 lb).           Physical Exam  Vitals reviewed.   Constitutional:       Appearance: Normal appearance.   HENT:      Head: Normocephalic and atraumatic.   Cardiovascular:      Rate and Rhythm: Normal rate and regular rhythm.      Pulses: Normal pulses.      Heart sounds: Normal heart sounds.   Pulmonary:      Effort: Pulmonary effort is normal.      Breath sounds: Normal breath sounds.   Musculoskeletal:         General: Normal range of motion.   Skin:     General: Skin is " warm.      Capillary Refill: Capillary refill takes less than 2 seconds.   Neurological:      General: No focal deficit present.      Mental Status: He is alert and oriented to person, place, and time.   Psychiatric:         Mood and Affect: Mood normal.         Behavior: Behavior normal.         Thought Content: Thought content normal.         Judgment: Judgment normal.      Result Review :  Pathology was reviewed by myself.  Consistent with primary adenocarcinoma of the right upper lobe           Assessment and Plan   Diagnoses and all orders for this visit:    1. Lung mass (Primary)    Mr. De La Torre is a pleasant 82-year-old gentleman who presents today after undergoing an ion bronchoscopy with biopsies of a right upper lobe pulmonary mass.  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  He has adequate pulmonary reserve for primary resection.  He is undergone mediastinal staging and MRI of the brain due to the size of his primary mass.  All of these things have been negative for metastatic disease.    After long discussion, risk benefits and alternatives were discussed and he would like to proceed with a right upper lobectomy with mediastinal lymph node dissection.  We will schedule this in the next coming weeks.       I spent 36 minutes caring for Sohan on this date of service. This time includes time spent by me in the following activities:preparing for the visit, reviewing tests, obtaining and/or reviewing a separately obtained history, performing a medically appropriate examination and/or evaluation , counseling and educating the patient/family/caregiver, ordering medications, tests, or procedures, referring and communicating with other health care professionals , documenting information in the medical record, independently interpreting results and communicating that information with the patient/family/caregiver, and care coordination  Follow Up   No follow-ups on file.  Patient was  given instructions and counseling regarding his condition or for health maintenance advice. Please see specific information pulled into the AVS if appropriate.

## 2023-07-28 ENCOUNTER — PATIENT OUTREACH (OUTPATIENT)
Dept: OTHER | Facility: HOSPITAL | Age: 83
End: 2023-07-28
Payer: MEDICARE

## 2023-07-28 NOTE — PROGRESS NOTES
Reviewed pt chart prior to call    Called patient. Left message that I was just touching base to see how he was doing. Wanted to know if he had any questions/concerns prior to his surgery on Monday or resource/supportive care needs; requested cb.  If we don't connect, I will try to attend his post-op appt with Dr. White

## 2023-07-31 ENCOUNTER — HOSPITAL ENCOUNTER (INPATIENT)
Facility: HOSPITAL | Age: 83
LOS: 7 days | Discharge: HOME | DRG: 164 | End: 2023-08-07
Attending: STUDENT IN AN ORGANIZED HEALTH CARE EDUCATION/TRAINING PROGRAM | Admitting: STUDENT IN AN ORGANIZED HEALTH CARE EDUCATION/TRAINING PROGRAM
Payer: MEDICARE

## 2023-07-31 ENCOUNTER — ANESTHESIA EVENT (OUTPATIENT)
Dept: PERIOP | Facility: HOSPITAL | Age: 83
DRG: 164 | End: 2023-07-31
Payer: MEDICARE

## 2023-07-31 ENCOUNTER — APPOINTMENT (OUTPATIENT)
Dept: GENERAL RADIOLOGY | Facility: HOSPITAL | Age: 83
DRG: 164 | End: 2023-07-31
Payer: MEDICARE

## 2023-07-31 ENCOUNTER — ANESTHESIA (OUTPATIENT)
Dept: PERIOP | Facility: HOSPITAL | Age: 83
DRG: 164 | End: 2023-07-31
Payer: MEDICARE

## 2023-07-31 DIAGNOSIS — C34.91 NON-SMALL CELL CANCER OF RIGHT LUNG: Primary | ICD-10-CM

## 2023-07-31 DIAGNOSIS — R91.8 LUNG MASS: ICD-10-CM

## 2023-07-31 PROBLEM — C34.90 NON-SMALL CELL LUNG CANCER: Status: ACTIVE | Noted: 2023-07-31

## 2023-07-31 LAB
ABO GROUP BLD: NORMAL
ANION GAP SERPL CALCULATED.3IONS-SCNC: 11 MMOL/L (ref 5–15)
BLD GP AB SCN SERPL QL: NEGATIVE
BUN SERPL-MCNC: 18 MG/DL (ref 8–23)
BUN/CREAT SERPL: 15 (ref 7–25)
CALCIUM SPEC-SCNC: 8.4 MG/DL (ref 8.6–10.5)
CHLORIDE SERPL-SCNC: 103 MMOL/L (ref 98–107)
CO2 SERPL-SCNC: 23 MMOL/L (ref 22–29)
CREAT SERPL-MCNC: 1.2 MG/DL (ref 0.76–1.27)
DEPRECATED RDW RBC AUTO: 46.1 FL (ref 37–54)
EGFRCR SERPLBLD CKD-EPI 2021: 60.8 ML/MIN/1.73
ERYTHROCYTE [DISTWIDTH] IN BLOOD BY AUTOMATED COUNT: 14 % (ref 12.3–15.4)
GLUCOSE BLDC GLUCOMTR-MCNC: 181 MG/DL (ref 70–130)
GLUCOSE BLDC GLUCOMTR-MCNC: 196 MG/DL (ref 70–130)
GLUCOSE SERPL-MCNC: 165 MG/DL (ref 65–99)
HCT VFR BLD AUTO: 38.7 % (ref 37.5–51)
HGB BLD-MCNC: 13.2 G/DL (ref 13–17.7)
MCH RBC QN AUTO: 30.6 PG (ref 26.6–33)
MCHC RBC AUTO-ENTMCNC: 34.1 G/DL (ref 31.5–35.7)
MCV RBC AUTO: 89.8 FL (ref 79–97)
PLATELET # BLD AUTO: 216 10*3/MM3 (ref 140–450)
PMV BLD AUTO: 10.9 FL (ref 6–12)
POTASSIUM SERPL-SCNC: 5.1 MMOL/L (ref 3.5–5.2)
RBC # BLD AUTO: 4.31 10*6/MM3 (ref 4.14–5.8)
RH BLD: POSITIVE
SODIUM SERPL-SCNC: 137 MMOL/L (ref 136–145)
T&S EXPIRATION DATE: NORMAL
WBC NRBC COR # BLD: 15.98 10*3/MM3 (ref 3.4–10.8)

## 2023-07-31 PROCEDURE — 25010000002 ONDANSETRON PER 1 MG: Performed by: REGISTERED NURSE

## 2023-07-31 PROCEDURE — 80048 BASIC METABOLIC PNL TOTAL CA: CPT | Performed by: STUDENT IN AN ORGANIZED HEALTH CARE EDUCATION/TRAINING PROGRAM

## 2023-07-31 PROCEDURE — 88342 IMHCHEM/IMCYTCHM 1ST ANTB: CPT | Performed by: STUDENT IN AN ORGANIZED HEALTH CARE EDUCATION/TRAINING PROGRAM

## 2023-07-31 PROCEDURE — C9290 INJ, BUPIVACAINE LIPOSOME: HCPCS | Performed by: STUDENT IN AN ORGANIZED HEALTH CARE EDUCATION/TRAINING PROGRAM

## 2023-07-31 PROCEDURE — 86850 RBC ANTIBODY SCREEN: CPT | Performed by: ANESTHESIOLOGY

## 2023-07-31 PROCEDURE — 25010000002 PHENYLEPHRINE 10 MG/ML SOLUTION 5 ML VIAL: Performed by: REGISTERED NURSE

## 2023-07-31 PROCEDURE — 25010000002 MAGNESIUM SULFATE PER 500 MG OF MAGNESIUM: Performed by: REGISTERED NURSE

## 2023-07-31 PROCEDURE — 0BTC4ZZ RESECTION OF RIGHT UPPER LUNG LOBE, PERCUTANEOUS ENDOSCOPIC APPROACH: ICD-10-PCS | Performed by: STUDENT IN AN ORGANIZED HEALTH CARE EDUCATION/TRAINING PROGRAM

## 2023-07-31 PROCEDURE — 25010000002 HYDROMORPHONE 1 MG/ML SOLUTION: Performed by: REGISTERED NURSE

## 2023-07-31 PROCEDURE — C1751 CATH, INF, PER/CENT/MIDLINE: HCPCS | Performed by: ANESTHESIOLOGY

## 2023-07-31 PROCEDURE — 88305 TISSUE EXAM BY PATHOLOGIST: CPT | Performed by: STUDENT IN AN ORGANIZED HEALTH CARE EDUCATION/TRAINING PROGRAM

## 2023-07-31 PROCEDURE — 25010000002 PROPOFOL 10 MG/ML EMULSION: Performed by: REGISTERED NURSE

## 2023-07-31 PROCEDURE — 25010000002 FENTANYL CITRATE (PF) 50 MCG/ML SOLUTION: Performed by: ANESTHESIOLOGY

## 2023-07-31 PROCEDURE — 86900 BLOOD TYPING SEROLOGIC ABO: CPT | Performed by: ANESTHESIOLOGY

## 2023-07-31 PROCEDURE — 25010000002 BUPIVACAINE (PF) 0.25 % SOLUTION: Performed by: ANESTHESIOLOGY

## 2023-07-31 PROCEDURE — 32674 THORACOSCOPY LYMPH NODE EXC: CPT | Performed by: STUDENT IN AN ORGANIZED HEALTH CARE EDUCATION/TRAINING PROGRAM

## 2023-07-31 PROCEDURE — 32663 THORACOSCOPY W/LOBECTOMY: CPT | Performed by: SPECIALIST/TECHNOLOGIST, OTHER

## 2023-07-31 PROCEDURE — 07B74ZX EXCISION OF THORAX LYMPHATIC, PERCUTANEOUS ENDOSCOPIC APPROACH, DIAGNOSTIC: ICD-10-PCS | Performed by: STUDENT IN AN ORGANIZED HEALTH CARE EDUCATION/TRAINING PROGRAM

## 2023-07-31 PROCEDURE — 0 BUPIVACAINE LIPOSOME 1.3 % SUSPENSION: Performed by: STUDENT IN AN ORGANIZED HEALTH CARE EDUCATION/TRAINING PROGRAM

## 2023-07-31 PROCEDURE — 88309 TISSUE EXAM BY PATHOLOGIST: CPT | Performed by: STUDENT IN AN ORGANIZED HEALTH CARE EDUCATION/TRAINING PROGRAM

## 2023-07-31 PROCEDURE — 93010 ELECTROCARDIOGRAM REPORT: CPT | Performed by: INTERNAL MEDICINE

## 2023-07-31 PROCEDURE — 86901 BLOOD TYPING SEROLOGIC RH(D): CPT | Performed by: ANESTHESIOLOGY

## 2023-07-31 PROCEDURE — 25010000002 PHENYLEPHRINE 10 MG/ML SOLUTION: Performed by: REGISTERED NURSE

## 2023-07-31 PROCEDURE — C9290 INJ, BUPIVACAINE LIPOSOME: HCPCS | Performed by: ANESTHESIOLOGY

## 2023-07-31 PROCEDURE — 93005 ELECTROCARDIOGRAM TRACING: CPT | Performed by: STUDENT IN AN ORGANIZED HEALTH CARE EDUCATION/TRAINING PROGRAM

## 2023-07-31 PROCEDURE — 71045 X-RAY EXAM CHEST 1 VIEW: CPT

## 2023-07-31 PROCEDURE — 82948 REAGENT STRIP/BLOOD GLUCOSE: CPT

## 2023-07-31 PROCEDURE — 25010000002 CEFOXITIN PER 1 G: Performed by: REGISTERED NURSE

## 2023-07-31 PROCEDURE — 88341 IMHCHEM/IMCYTCHM EA ADD ANTB: CPT | Performed by: STUDENT IN AN ORGANIZED HEALTH CARE EDUCATION/TRAINING PROGRAM

## 2023-07-31 PROCEDURE — 25010000002 SUGAMMADEX 200 MG/2ML SOLUTION: Performed by: REGISTERED NURSE

## 2023-07-31 PROCEDURE — 0BJ08ZZ INSPECTION OF TRACHEOBRONCHIAL TREE, VIA NATURAL OR ARTIFICIAL OPENING ENDOSCOPIC: ICD-10-PCS | Performed by: STUDENT IN AN ORGANIZED HEALTH CARE EDUCATION/TRAINING PROGRAM

## 2023-07-31 PROCEDURE — 25010000002 FENTANYL CITRATE (PF) 50 MCG/ML SOLUTION: Performed by: REGISTERED NURSE

## 2023-07-31 PROCEDURE — 25010000002 LABETALOL 5 MG/ML SOLUTION: Performed by: ANESTHESIOLOGY

## 2023-07-31 PROCEDURE — 25010000002 HEPARIN (PORCINE) PER 1000 UNITS: Performed by: STUDENT IN AN ORGANIZED HEALTH CARE EDUCATION/TRAINING PROGRAM

## 2023-07-31 PROCEDURE — 25010000002 DEXAMETHASONE SODIUM PHOSPHATE 20 MG/5ML SOLUTION: Performed by: REGISTERED NURSE

## 2023-07-31 PROCEDURE — 25010000002 HYDROMORPHONE PER 4 MG: Performed by: REGISTERED NURSE

## 2023-07-31 PROCEDURE — 0 BUPIVACAINE LIPOSOME 1.3 % SUSPENSION: Performed by: ANESTHESIOLOGY

## 2023-07-31 PROCEDURE — 32674 THORACOSCOPY LYMPH NODE EXC: CPT | Performed by: SPECIALIST/TECHNOLOGIST, OTHER

## 2023-07-31 PROCEDURE — 25010000002 CEFOXITIN PER 1 G: Performed by: STUDENT IN AN ORGANIZED HEALTH CARE EDUCATION/TRAINING PROGRAM

## 2023-07-31 PROCEDURE — 3E0T3BZ INTRODUCTION OF ANESTHETIC AGENT INTO PERIPHERAL NERVES AND PLEXI, PERCUTANEOUS APPROACH: ICD-10-PCS | Performed by: STUDENT IN AN ORGANIZED HEALTH CARE EDUCATION/TRAINING PROGRAM

## 2023-07-31 PROCEDURE — 8E0W4CZ ROBOTIC ASSISTED PROCEDURE OF TRUNK REGION, PERCUTANEOUS ENDOSCOPIC APPROACH: ICD-10-PCS | Performed by: STUDENT IN AN ORGANIZED HEALTH CARE EDUCATION/TRAINING PROGRAM

## 2023-07-31 PROCEDURE — 85027 COMPLETE CBC AUTOMATED: CPT | Performed by: STUDENT IN AN ORGANIZED HEALTH CARE EDUCATION/TRAINING PROGRAM

## 2023-07-31 PROCEDURE — 32663 THORACOSCOPY W/LOBECTOMY: CPT | Performed by: STUDENT IN AN ORGANIZED HEALTH CARE EDUCATION/TRAINING PROGRAM

## 2023-07-31 PROCEDURE — 25010000002 HYDROMORPHONE PER 4 MG: Performed by: ANESTHESIOLOGY

## 2023-07-31 DEVICE — ABSORBABLE HEMOSTAT (OXIDIZED REGENERATED CELLULOSE, U.S.P.)
Type: IMPLANTABLE DEVICE | Site: LUNG | Status: FUNCTIONAL
Brand: SURGICEL

## 2023-07-31 DEVICE — KNOTLESS TISSUE CONTROL DEVICE, VIOLET UNIDIRECTIONAL (ANTIBACTERIAL) SYNTHETIC ABSORBABLE DEVICE
Type: IMPLANTABLE DEVICE | Site: LUNG | Status: FUNCTIONAL
Brand: STRATAFIX

## 2023-07-31 DEVICE — SUREFORM 45 RELOAD GREEN
Type: IMPLANTABLE DEVICE | Site: LUNG | Status: FUNCTIONAL
Brand: SUREFORM

## 2023-07-31 DEVICE — STAPLER 30 RELOAD WHITE
Type: IMPLANTABLE DEVICE | Site: LUNG | Status: FUNCTIONAL
Brand: ENDOWRIST

## 2023-07-31 DEVICE — STAPLER 30 RELOAD GREEN
Type: IMPLANTABLE DEVICE | Site: LUNG | Status: FUNCTIONAL
Brand: ENDOWRIST

## 2023-07-31 RX ORDER — CELECOXIB 200 MG/1
200 CAPSULE ORAL ONCE
Status: COMPLETED | OUTPATIENT
Start: 2023-07-31 | End: 2023-07-31

## 2023-07-31 RX ORDER — HYDROMORPHONE HYDROCHLORIDE 1 MG/ML
0.25 INJECTION, SOLUTION INTRAMUSCULAR; INTRAVENOUS; SUBCUTANEOUS
Status: DISCONTINUED | OUTPATIENT
Start: 2023-07-31 | End: 2023-07-31 | Stop reason: HOSPADM

## 2023-07-31 RX ORDER — KETAMINE HCL IN NACL, ISO-OSM 100MG/10ML
10 SYRINGE (ML) INJECTION
Status: DISCONTINUED | OUTPATIENT
Start: 2023-07-31 | End: 2023-07-31 | Stop reason: HOSPADM

## 2023-07-31 RX ORDER — ACETAMINOPHEN 500 MG
1000 TABLET ORAL ONCE
Status: DISCONTINUED | OUTPATIENT
Start: 2023-07-31 | End: 2023-07-31 | Stop reason: SDUPTHER

## 2023-07-31 RX ORDER — ACETAMINOPHEN 500 MG
1000 TABLET ORAL 3 TIMES DAILY
Status: DISCONTINUED | OUTPATIENT
Start: 2023-07-31 | End: 2023-08-07 | Stop reason: HOSPADM

## 2023-07-31 RX ORDER — ATORVASTATIN CALCIUM 20 MG/1
40 TABLET, FILM COATED ORAL NIGHTLY
Status: DISCONTINUED | OUTPATIENT
Start: 2023-07-31 | End: 2023-08-07 | Stop reason: HOSPADM

## 2023-07-31 RX ORDER — NITROGLYCERIN 0.4 MG/1
0.4 TABLET SUBLINGUAL
Status: DISCONTINUED | OUTPATIENT
Start: 2023-07-31 | End: 2023-08-07 | Stop reason: HOSPADM

## 2023-07-31 RX ORDER — GABAPENTIN 300 MG/1
300 CAPSULE ORAL ONCE
Status: COMPLETED | OUTPATIENT
Start: 2023-07-31 | End: 2023-07-31

## 2023-07-31 RX ORDER — DROPERIDOL 2.5 MG/ML
0.62 INJECTION, SOLUTION INTRAMUSCULAR; INTRAVENOUS
Status: DISCONTINUED | OUTPATIENT
Start: 2023-07-31 | End: 2023-07-31 | Stop reason: HOSPADM

## 2023-07-31 RX ORDER — PROPOFOL 10 MG/ML
VIAL (ML) INTRAVENOUS AS NEEDED
Status: DISCONTINUED | OUTPATIENT
Start: 2023-07-31 | End: 2023-07-31 | Stop reason: SURG

## 2023-07-31 RX ORDER — BUPIVACAINE HYDROCHLORIDE 2.5 MG/ML
INJECTION, SOLUTION EPIDURAL; INFILTRATION; INTRACAUDAL
Status: COMPLETED
Start: 2023-07-31 | End: 2023-07-31

## 2023-07-31 RX ORDER — HEPARIN SODIUM 5000 [USP'U]/ML
5000 INJECTION, SOLUTION INTRAVENOUS; SUBCUTANEOUS
Status: DISCONTINUED | OUTPATIENT
Start: 2023-08-01 | End: 2023-07-31

## 2023-07-31 RX ORDER — FENTANYL CITRATE 50 UG/ML
50 INJECTION, SOLUTION INTRAMUSCULAR; INTRAVENOUS
Status: DISCONTINUED | OUTPATIENT
Start: 2023-07-31 | End: 2023-07-31 | Stop reason: HOSPADM

## 2023-07-31 RX ORDER — SODIUM CHLORIDE 0.9 % (FLUSH) 0.9 %
3-10 SYRINGE (ML) INJECTION AS NEEDED
Status: DISCONTINUED | OUTPATIENT
Start: 2023-07-31 | End: 2023-07-31 | Stop reason: HOSPADM

## 2023-07-31 RX ORDER — SODIUM CHLORIDE, SODIUM LACTATE, POTASSIUM CHLORIDE, CALCIUM CHLORIDE 600; 310; 30; 20 MG/100ML; MG/100ML; MG/100ML; MG/100ML
9 INJECTION, SOLUTION INTRAVENOUS CONTINUOUS
Status: DISCONTINUED | OUTPATIENT
Start: 2023-07-31 | End: 2023-07-31

## 2023-07-31 RX ORDER — ACETAMINOPHEN 500 MG
1000 TABLET ORAL ONCE
Status: COMPLETED | OUTPATIENT
Start: 2023-07-31 | End: 2023-07-31

## 2023-07-31 RX ORDER — ONDANSETRON 4 MG/1
4 TABLET, FILM COATED ORAL EVERY 6 HOURS PRN
Status: DISCONTINUED | OUTPATIENT
Start: 2023-07-31 | End: 2023-08-07 | Stop reason: HOSPADM

## 2023-07-31 RX ORDER — PHENYLEPHRINE HYDROCHLORIDE 10 MG/ML
INJECTION INTRAVENOUS AS NEEDED
Status: DISCONTINUED | OUTPATIENT
Start: 2023-07-31 | End: 2023-07-31 | Stop reason: SURG

## 2023-07-31 RX ORDER — OXYCODONE HYDROCHLORIDE 5 MG/1
5 TABLET ORAL EVERY 4 HOURS PRN
Status: DISPENSED | OUTPATIENT
Start: 2023-07-31 | End: 2023-08-07

## 2023-07-31 RX ORDER — DEXAMETHASONE SODIUM PHOSPHATE 4 MG/ML
INJECTION, SOLUTION INTRA-ARTICULAR; INTRALESIONAL; INTRAMUSCULAR; INTRAVENOUS; SOFT TISSUE AS NEEDED
Status: DISCONTINUED | OUTPATIENT
Start: 2023-07-31 | End: 2023-07-31 | Stop reason: SURG

## 2023-07-31 RX ORDER — FENTANYL CITRATE 50 UG/ML
INJECTION, SOLUTION INTRAMUSCULAR; INTRAVENOUS AS NEEDED
Status: DISCONTINUED | OUTPATIENT
Start: 2023-07-31 | End: 2023-07-31 | Stop reason: SURG

## 2023-07-31 RX ORDER — HEPARIN SODIUM 5000 [USP'U]/ML
5000 INJECTION, SOLUTION INTRAVENOUS; SUBCUTANEOUS ONCE
Status: COMPLETED | OUTPATIENT
Start: 2023-07-31 | End: 2023-07-31

## 2023-07-31 RX ORDER — LIDOCAINE HYDROCHLORIDE 20 MG/ML
INJECTION, SOLUTION INFILTRATION; PERINEURAL AS NEEDED
Status: DISCONTINUED | OUTPATIENT
Start: 2023-07-31 | End: 2023-07-31 | Stop reason: SURG

## 2023-07-31 RX ORDER — FENTANYL CITRATE 50 UG/ML
25 INJECTION, SOLUTION INTRAMUSCULAR; INTRAVENOUS
Status: DISCONTINUED | OUTPATIENT
Start: 2023-07-31 | End: 2023-07-31 | Stop reason: HOSPADM

## 2023-07-31 RX ORDER — CEFOXITIN 2 G/1
INJECTION, POWDER, FOR SOLUTION INTRAVENOUS AS NEEDED
Status: DISCONTINUED | OUTPATIENT
Start: 2023-07-31 | End: 2023-07-31 | Stop reason: SURG

## 2023-07-31 RX ORDER — HYDROMORPHONE HYDROCHLORIDE 1 MG/ML
0.5 INJECTION, SOLUTION INTRAMUSCULAR; INTRAVENOUS; SUBCUTANEOUS
Status: ACTIVE | OUTPATIENT
Start: 2023-07-31 | End: 2023-08-07

## 2023-07-31 RX ORDER — NITROGLYCERIN 0.4 MG/1
0.4 TABLET SUBLINGUAL
Status: DISCONTINUED | OUTPATIENT
Start: 2023-07-31 | End: 2023-07-31

## 2023-07-31 RX ORDER — KETAMINE HCL IN NACL, ISO-OSM 100MG/10ML
SYRINGE (ML) INJECTION AS NEEDED
Status: DISCONTINUED | OUTPATIENT
Start: 2023-07-31 | End: 2023-07-31 | Stop reason: SURG

## 2023-07-31 RX ORDER — NALOXONE HCL 0.4 MG/ML
0.2 VIAL (ML) INJECTION AS NEEDED
Status: DISCONTINUED | OUTPATIENT
Start: 2023-07-31 | End: 2023-07-31 | Stop reason: HOSPADM

## 2023-07-31 RX ORDER — BUPIVACAINE HYDROCHLORIDE 2.5 MG/ML
INJECTION, SOLUTION EPIDURAL; INFILTRATION; INTRACAUDAL
Status: COMPLETED | OUTPATIENT
Start: 2023-07-31 | End: 2023-07-31

## 2023-07-31 RX ORDER — VENLAFAXINE HYDROCHLORIDE 150 MG/1
150 CAPSULE, EXTENDED RELEASE ORAL NIGHTLY
Status: DISCONTINUED | OUTPATIENT
Start: 2023-07-31 | End: 2023-08-07 | Stop reason: HOSPADM

## 2023-07-31 RX ORDER — PROMETHAZINE HYDROCHLORIDE 25 MG/1
25 TABLET ORAL ONCE AS NEEDED
Status: DISCONTINUED | OUTPATIENT
Start: 2023-07-31 | End: 2023-07-31 | Stop reason: HOSPADM

## 2023-07-31 RX ORDER — GABAPENTIN 300 MG/1
300 CAPSULE ORAL EVERY 8 HOURS SCHEDULED
Status: DISCONTINUED | OUTPATIENT
Start: 2023-07-31 | End: 2023-08-07 | Stop reason: HOSPADM

## 2023-07-31 RX ORDER — ONDANSETRON 2 MG/ML
4 INJECTION INTRAMUSCULAR; INTRAVENOUS EVERY 6 HOURS PRN
Status: DISCONTINUED | OUTPATIENT
Start: 2023-07-31 | End: 2023-08-07 | Stop reason: HOSPADM

## 2023-07-31 RX ORDER — HYDRALAZINE HYDROCHLORIDE 20 MG/ML
5 INJECTION INTRAMUSCULAR; INTRAVENOUS
Status: DISCONTINUED | OUTPATIENT
Start: 2023-07-31 | End: 2023-07-31 | Stop reason: HOSPADM

## 2023-07-31 RX ORDER — ASPIRIN 81 MG/1
81 TABLET ORAL NIGHTLY
Status: DISCONTINUED | OUTPATIENT
Start: 2023-07-31 | End: 2023-08-07 | Stop reason: HOSPADM

## 2023-07-31 RX ORDER — ONDANSETRON 2 MG/ML
4 INJECTION INTRAMUSCULAR; INTRAVENOUS ONCE AS NEEDED
Status: DISCONTINUED | OUTPATIENT
Start: 2023-07-31 | End: 2023-07-31 | Stop reason: HOSPADM

## 2023-07-31 RX ORDER — HYDROCODONE BITARTRATE AND ACETAMINOPHEN 5; 325 MG/1; MG/1
1 TABLET ORAL ONCE AS NEEDED
Status: DISCONTINUED | OUTPATIENT
Start: 2023-07-31 | End: 2023-07-31 | Stop reason: HOSPADM

## 2023-07-31 RX ORDER — ROCURONIUM BROMIDE 10 MG/ML
INJECTION, SOLUTION INTRAVENOUS AS NEEDED
Status: DISCONTINUED | OUTPATIENT
Start: 2023-07-31 | End: 2023-07-31 | Stop reason: SURG

## 2023-07-31 RX ORDER — ALPRAZOLAM 0.25 MG/1
0.25 TABLET ORAL 3 TIMES DAILY PRN
Status: DISCONTINUED | OUTPATIENT
Start: 2023-07-31 | End: 2023-08-03

## 2023-07-31 RX ORDER — FAMOTIDINE 10 MG/ML
20 INJECTION, SOLUTION INTRAVENOUS ONCE
Status: COMPLETED | OUTPATIENT
Start: 2023-07-31 | End: 2023-07-31

## 2023-07-31 RX ORDER — DEXTROSE MONOHYDRATE, SODIUM CHLORIDE, AND POTASSIUM CHLORIDE 50; 1.49; 4.5 G/1000ML; G/1000ML; G/1000ML
75 INJECTION, SOLUTION INTRAVENOUS CONTINUOUS
Status: DISCONTINUED | OUTPATIENT
Start: 2023-07-31 | End: 2023-08-01

## 2023-07-31 RX ORDER — CELECOXIB 100 MG/1
100 CAPSULE ORAL EVERY 12 HOURS SCHEDULED
Status: DISCONTINUED | OUTPATIENT
Start: 2023-07-31 | End: 2023-08-07 | Stop reason: HOSPADM

## 2023-07-31 RX ORDER — MAGNESIUM SULFATE HEPTAHYDRATE 500 MG/ML
INJECTION, SOLUTION INTRAMUSCULAR; INTRAVENOUS AS NEEDED
Status: DISCONTINUED | OUTPATIENT
Start: 2023-07-31 | End: 2023-07-31 | Stop reason: SURG

## 2023-07-31 RX ORDER — LABETALOL HYDROCHLORIDE 5 MG/ML
5 INJECTION, SOLUTION INTRAVENOUS
Status: DISCONTINUED | OUTPATIENT
Start: 2023-07-31 | End: 2023-07-31 | Stop reason: HOSPADM

## 2023-07-31 RX ORDER — HYDROMORPHONE HYDROCHLORIDE 1 MG/ML
0.5 INJECTION, SOLUTION INTRAMUSCULAR; INTRAVENOUS; SUBCUTANEOUS
Status: COMPLETED | OUTPATIENT
Start: 2023-07-31 | End: 2023-07-31

## 2023-07-31 RX ORDER — PROMETHAZINE HYDROCHLORIDE 25 MG/1
25 SUPPOSITORY RECTAL ONCE AS NEEDED
Status: DISCONTINUED | OUTPATIENT
Start: 2023-07-31 | End: 2023-07-31 | Stop reason: HOSPADM

## 2023-07-31 RX ORDER — EPHEDRINE SULFATE 50 MG/ML
5 INJECTION, SOLUTION INTRAVENOUS ONCE AS NEEDED
Status: DISCONTINUED | OUTPATIENT
Start: 2023-07-31 | End: 2023-07-31 | Stop reason: HOSPADM

## 2023-07-31 RX ORDER — HYDROCHLOROTHIAZIDE 25 MG/1
25 TABLET ORAL NIGHTLY
Status: DISCONTINUED | OUTPATIENT
Start: 2023-07-31 | End: 2023-08-07 | Stop reason: HOSPADM

## 2023-07-31 RX ORDER — ONDANSETRON 2 MG/ML
INJECTION INTRAMUSCULAR; INTRAVENOUS AS NEEDED
Status: DISCONTINUED | OUTPATIENT
Start: 2023-07-31 | End: 2023-07-31 | Stop reason: SURG

## 2023-07-31 RX ORDER — ENOXAPARIN SODIUM 100 MG/ML
40 INJECTION SUBCUTANEOUS DAILY
Status: DISCONTINUED | OUTPATIENT
Start: 2023-08-01 | End: 2023-08-07 | Stop reason: HOSPADM

## 2023-07-31 RX ORDER — IPRATROPIUM BROMIDE AND ALBUTEROL SULFATE 2.5; .5 MG/3ML; MG/3ML
3 SOLUTION RESPIRATORY (INHALATION) ONCE AS NEEDED
Status: DISCONTINUED | OUTPATIENT
Start: 2023-07-31 | End: 2023-07-31 | Stop reason: HOSPADM

## 2023-07-31 RX ORDER — SODIUM CHLORIDE 9 MG/ML
INJECTION, SOLUTION INTRAVENOUS AS NEEDED
Status: DISCONTINUED | OUTPATIENT
Start: 2023-07-31 | End: 2023-07-31 | Stop reason: HOSPADM

## 2023-07-31 RX ORDER — SODIUM CHLORIDE 0.9 % (FLUSH) 0.9 %
3 SYRINGE (ML) INJECTION EVERY 12 HOURS SCHEDULED
Status: DISCONTINUED | OUTPATIENT
Start: 2023-07-31 | End: 2023-07-31 | Stop reason: HOSPADM

## 2023-07-31 RX ORDER — FLUMAZENIL 0.1 MG/ML
0.2 INJECTION INTRAVENOUS AS NEEDED
Status: DISCONTINUED | OUTPATIENT
Start: 2023-07-31 | End: 2023-07-31 | Stop reason: HOSPADM

## 2023-07-31 RX ORDER — SODIUM CHLORIDE 9 MG/ML
40 INJECTION, SOLUTION INTRAVENOUS AS NEEDED
Status: DISCONTINUED | OUTPATIENT
Start: 2023-07-31 | End: 2023-07-31 | Stop reason: HOSPADM

## 2023-07-31 RX ORDER — HYDROCODONE BITARTRATE AND ACETAMINOPHEN 7.5; 325 MG/1; MG/1
1 TABLET ORAL EVERY 4 HOURS PRN
Status: DISCONTINUED | OUTPATIENT
Start: 2023-07-31 | End: 2023-07-31 | Stop reason: HOSPADM

## 2023-07-31 RX ORDER — DIPHENHYDRAMINE HYDROCHLORIDE 50 MG/ML
12.5 INJECTION INTRAMUSCULAR; INTRAVENOUS
Status: DISCONTINUED | OUTPATIENT
Start: 2023-07-31 | End: 2023-07-31 | Stop reason: HOSPADM

## 2023-07-31 RX ORDER — LABETALOL HYDROCHLORIDE 5 MG/ML
10 INJECTION, SOLUTION INTRAVENOUS ONCE
Status: COMPLETED | OUTPATIENT
Start: 2023-07-31 | End: 2023-07-31

## 2023-07-31 RX ORDER — FENTANYL CITRATE 50 UG/ML
INJECTION, SOLUTION INTRAMUSCULAR; INTRAVENOUS
Status: DISCONTINUED | OUTPATIENT
Start: 2023-07-31 | End: 2023-08-07 | Stop reason: HOSPADM

## 2023-07-31 RX ADMIN — HYDROMORPHONE HYDROCHLORIDE 0.25 MG: 1 INJECTION, SOLUTION INTRAMUSCULAR; INTRAVENOUS; SUBCUTANEOUS at 15:48

## 2023-07-31 RX ADMIN — ASPIRIN 81 MG: 81 TABLET, COATED ORAL at 21:05

## 2023-07-31 RX ADMIN — MAGNESIUM SULFATE HEPTAHYDRATE 2 G: 500 INJECTION, SOLUTION INTRAMUSCULAR; INTRAVENOUS at 08:44

## 2023-07-31 RX ADMIN — HYDROMORPHONE HYDROCHLORIDE 0.5 MG: 1 INJECTION, SOLUTION INTRAMUSCULAR; INTRAVENOUS; SUBCUTANEOUS at 16:34

## 2023-07-31 RX ADMIN — Medication 10 MG: at 15:58

## 2023-07-31 RX ADMIN — Medication 10 MG: at 15:29

## 2023-07-31 RX ADMIN — FENTANYL CITRATE 50 MCG: 50 INJECTION, SOLUTION INTRAMUSCULAR; INTRAVENOUS at 16:32

## 2023-07-31 RX ADMIN — FENTANYL CITRATE 50 MCG: 50 INJECTION, SOLUTION INTRAMUSCULAR; INTRAVENOUS at 06:49

## 2023-07-31 RX ADMIN — LIDOCAINE HYDROCHLORIDE 100 MG: 20 INJECTION, SOLUTION INFILTRATION; PERINEURAL at 07:49

## 2023-07-31 RX ADMIN — DEXAMETHASONE SODIUM PHOSPHATE 10 MG: 4 INJECTION, SOLUTION INTRAMUSCULAR; INTRAVENOUS at 08:44

## 2023-07-31 RX ADMIN — ACETAMINOPHEN 1000 MG: 500 TABLET ORAL at 06:44

## 2023-07-31 RX ADMIN — PROPOFOL 180 MG: 10 INJECTION, EMULSION INTRAVENOUS at 07:49

## 2023-07-31 RX ADMIN — CEFOXITIN SODIUM 2 G: 2 POWDER, FOR SOLUTION INTRAVENOUS at 09:26

## 2023-07-31 RX ADMIN — PROPOFOL 25 MCG/KG/MIN: 10 INJECTION, EMULSION INTRAVENOUS at 08:00

## 2023-07-31 RX ADMIN — Medication 25 MG: at 08:00

## 2023-07-31 RX ADMIN — Medication 10 MG: at 09:30

## 2023-07-31 RX ADMIN — ROCURONIUM BROMIDE 50 MG: 10 INJECTION, SOLUTION INTRAVENOUS at 07:49

## 2023-07-31 RX ADMIN — CELECOXIB 200 MG: 200 CAPSULE ORAL at 06:44

## 2023-07-31 RX ADMIN — Medication 10 MG: at 09:00

## 2023-07-31 RX ADMIN — VENLAFAXINE HYDROCHLORIDE 150 MG: 150 CAPSULE, EXTENDED RELEASE ORAL at 21:05

## 2023-07-31 RX ADMIN — Medication 10 MG: at 13:47

## 2023-07-31 RX ADMIN — HYDROMORPHONE HYDROCHLORIDE 0.5 MG: 1 INJECTION, SOLUTION INTRAMUSCULAR; INTRAVENOUS; SUBCUTANEOUS at 12:03

## 2023-07-31 RX ADMIN — Medication 5 MG: at 10:30

## 2023-07-31 RX ADMIN — PHENYLEPHRINE HYDROCHLORIDE 0.5 MCG/KG/MIN: 10 INJECTION, SOLUTION INTRAVENOUS at 09:13

## 2023-07-31 RX ADMIN — FENTANYL CITRATE 50 MCG: 50 INJECTION, SOLUTION INTRAMUSCULAR; INTRAVENOUS at 07:49

## 2023-07-31 RX ADMIN — HEPARIN SODIUM 5000 UNITS: 5000 INJECTION INTRAVENOUS; SUBCUTANEOUS at 07:28

## 2023-07-31 RX ADMIN — PHENYLEPHRINE HYDROCHLORIDE 200 MCG: 10 INJECTION INTRAVENOUS at 07:50

## 2023-07-31 RX ADMIN — ATORVASTATIN CALCIUM 40 MG: 20 TABLET, FILM COATED ORAL at 21:05

## 2023-07-31 RX ADMIN — SODIUM CHLORIDE, POTASSIUM CHLORIDE, SODIUM LACTATE AND CALCIUM CHLORIDE 9 ML/HR: 600; 310; 30; 20 INJECTION, SOLUTION INTRAVENOUS at 06:43

## 2023-07-31 RX ADMIN — HYDROMORPHONE HYDROCHLORIDE 0.25 MG: 1 INJECTION, SOLUTION INTRAMUSCULAR; INTRAVENOUS; SUBCUTANEOUS at 13:36

## 2023-07-31 RX ADMIN — ONDANSETRON 8 MG: 2 INJECTION INTRAMUSCULAR; INTRAVENOUS at 08:44

## 2023-07-31 RX ADMIN — SUGAMMADEX 400 MG: 100 INJECTION, SOLUTION INTRAVENOUS at 12:03

## 2023-07-31 RX ADMIN — HYDROMORPHONE HYDROCHLORIDE 0.5 MG: 1 INJECTION, SOLUTION INTRAMUSCULAR; INTRAVENOUS; SUBCUTANEOUS at 17:12

## 2023-07-31 RX ADMIN — CEFOXITIN SODIUM 2 G: 2 POWDER, FOR SOLUTION INTRAVENOUS at 11:17

## 2023-07-31 RX ADMIN — GABAPENTIN 300 MG: 300 CAPSULE ORAL at 06:44

## 2023-07-31 RX ADMIN — LABETALOL HYDROCHLORIDE 10 MG: 5 INJECTION, SOLUTION INTRAVENOUS at 07:22

## 2023-07-31 RX ADMIN — FAMOTIDINE 20 MG: 10 INJECTION INTRAVENOUS at 06:44

## 2023-07-31 RX ADMIN — ACETAMINOPHEN 1000 MG: 500 TABLET, FILM COATED ORAL at 21:05

## 2023-07-31 RX ADMIN — CELECOXIB 100 MG: 100 CAPSULE ORAL at 21:05

## 2023-07-31 RX ADMIN — DILTIAZEM HYDROCHLORIDE 30 MG: 30 TABLET, FILM COATED ORAL at 21:05

## 2023-07-31 RX ADMIN — GABAPENTIN 300 MG: 300 CAPSULE ORAL at 21:05

## 2023-07-31 RX ADMIN — HYDROCHLOROTHIAZIDE 25 MG: 25 TABLET ORAL at 22:25

## 2023-07-31 RX ADMIN — BUPIVACAINE HYDROCHLORIDE 20 ML: 2.5 INJECTION, SOLUTION EPIDURAL; INFILTRATION; INTRACAUDAL; PERINEURAL at 07:00

## 2023-07-31 RX ADMIN — POTASSIUM CHLORIDE, DEXTROSE MONOHYDRATE AND SODIUM CHLORIDE 75 ML/HR: 150; 5; 450 INJECTION, SOLUTION INTRAVENOUS at 21:04

## 2023-07-31 RX ADMIN — CELECOXIB 100 MG: 100 CAPSULE ORAL at 15:48

## 2023-07-31 RX ADMIN — CEFOXITIN SODIUM 2000 MG: 2 POWDER, FOR SOLUTION INTRAVENOUS at 07:26

## 2023-07-31 RX ADMIN — BUPIVACAINE 10 ML: 13.3 INJECTION, SUSPENSION, LIPOSOMAL INFILTRATION at 07:00

## 2023-07-31 NOTE — PLAN OF CARE
Goal Outcome Evaluation:      VSS. A&Ox4. 2L NC. R Chest tube, on -20 suction, positive airleak, no SQ air. SR. L a-line, R triple IJ, moody in place. Family at bedside, call light within reach.

## 2023-07-31 NOTE — OP NOTE
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   Robot assisted Thoracoscopic right upper lobectomy.   Systematic Mediastinal Lymph node dissection.   Intercostal Nerve Block.     Synoptic portion:  Intent: curative  Surgical procedure performed:  Resection of at least one lobe or bilobectomy - Lobectomy WITH mediastinal lymph node dissection  Mediastinal lymph node stations resected:  2R Upper Paratracheal (right), 4R Lower Paratracheal (right), 7 Subcarinal, and 8 Paraesophageal (below bruno)  Hilar lymph node stations resected:  10 Hilar, 11 Interlobar, and 12 Lobar    Indications:   Mr. De La Torre is a pleasant 82-year-old gentleman who had been worked up for a greater than 3 cm right upper lobe pulmonary mass.  This was biopsied via Ion bronchoscopy and he underwent mediastinal lymph node sampling at the same time.  Biopsy of the primary lesion showed invasive moderately differentiated pulmonary adenocarcinoma and he had no mediastinal lymph nodes  positive for cancer.  He had sufficient pulmonary function for anatomic resection.  He presents today for elective scheduled right upper lobectomy.    After discussing the risks and benefits of the procedure, the patient provided informed consent for a Flexible Bronchoscopy, Robotic assisted right upper lobectomy, and mediastinal lymph node dissection.    Findings:  No evidence of pleural implants or effusion at the end of the procedure.   Successful removal of the right upper lobe.  No metastatic disease appreciated intraoperatively.  Successful reexpansion of middle and lower lobe.  No signs of torsion noted during reexpansion.    Surgeon: Zuhair White MD, PhD    Assistants:Assistant: Ariel Jamil CSA was responsible for performing the following activities: Retraction, Suction, Irrigation, Suturing, Closing, Placing Dressing, and Held/Positioned Camera and their skilled assistance was necessary for the success of this case.    Anesthesia: General endotracheal - Double lumen tube administered by Anesthesiologist: Trevor Mccauley MD  CRNA: Kelsy Titus CRNA        Procedure Details   On 7/31/2023, the patient was brought to the operating room and placed supine on the operating table.  Following an uneventful induction of general anesthesia, she was intubated with a double-lumen endotracheal tube without incident.  Appropriate placement was confirmed with the pediatric bronchoscope.  We performed a complete bronchoscopic exam.  He had a copious amount of mucus production, specifically on the right side.  This was evacuated as much as possible.  He had normal right upper lobe, right middle lobe and right lower lobe endobronchial anatomy without signs of endobronchial lesions.  In addition he had a normal left upper and left lower lobe.  A radial arterial line and additional peripheral IVs were placed by the Anesthesia team. Elizondo catheter was inserted.  Pneumatic compression devices placed to the  lower extremities.  Patient was repositioned in the left lateral decubitus position.  The table was jackknifed. All bony prominences were well padded.  The placement of endotracheal tube was confirmed again after positioning with the pediatric bronchoscope. The patient received Ancef for intravenous antibiotic prophylaxis and 5000 units subcutaneous heparin for DVT prophylaxis.  The right chest was prepped and draped in usual sterile fashion.  Prior to beginning the operation, a time-out was conducted with all members of the surgical team present.     I had my first incision in auscultatory triangle.  An 8 mm trocar was inserted in the seventh intercostal space.  The 30 degree 10 mm scope was then inserted into this trocar.  Visualization of the pleural space occurred and CO2 insufflation was initiated without issue.  He did have some issues desufflated his lung, I asked anesthesia to apply suction via the endotracheal tube and this helped isolate his right lung.  This maneuver then allowed me to count the interspaces and I placed my second robotic port in the most anterior aspect of the seventh intercostal space.  He had a small chest wall habitus, so I placed my other 2 remaining robotic ports 1 interspace down in the eighth intercostal space equally spaced anterior to posterior.  Finally, a working assistant port was placed in the ninth intercostal space between robotics port 2 and 3.  I then utilized a tip up, force bipolar, 10 mm 30 degree scope, and a long bipolar grasper as my working instruments.    The lung then was grasped and retracted superiorly, this allowed exposure to the inferior pulmonary ligament, this was taken all the way to the border of the inferior pulmonary vein with a long bipolar grasper.  A station 8R lymph node was encountered this was circumferentially dissected and ultimately sent off for pathology.  I did not appreciate a station 9R lymph node during the inferior pulmonary ligament  dissection.  The lung was then retracted anteriorly and the posterior pleural reflection was dissected free.  I then entered the subcarinal space dissected a station 7 lymph node and sent this off for permanent pathology.  I then identified the posterior aspect of the right mainstem the right upper lobe and the bronchus intermedius, the interlobar space between the right upper lobe and bronchus intermedius was dissected free Station 10 R lymph node was sent off for pathology.  The lung was then retracted inferiorly and the superior aspect of the pleural reflection was dissected free.  There was a station 10 R lymph node directly on top of the pulmonary artery and this was also sent off for pathology.  After this, the lung was retracted posteriorly and the anterior pleural reflection was dissected free.  He had variable pulmonary vein anatomy, and I took time to identify the 2 branches going to the upper lobe, the middle lobe vein and finally the lower lobe vein.  After this, the lung was allowed to fall in its anatomic position which opened up the major fissure.  I then dissected down onto the major fissure and got onto the plane of Leriche directly on top of the ongoing pulmonary artery.  This dissection was carried posteriorly and exited the interlobar space which would previously been dissected.  I placed a vessel loop around the posterior fissure, and then with 2 parenchymal staple fires I completed the posterior fissure without issue.  He did not have a posterior ascending branch of the pulmonary artery.  I then took the lung and retracted this inferiorly, this allowed me to expose the truncal branch of the pulmonary artery.  This was then encircled with a vessel loop and ultimately taken with a white vascular staple fire.  After this, I then allowed the lung to once again fall in its normal anatomic position, I encircled the right upper lobe bronchus and passed the stapler across this.  I then clamped down and  asked anesthesia to perform a test inflation.  There was good expansion of the middle and lower lobe while this stapler was clamped and I took the right upper lobe bronchus without issue.  I then position the lung posteriorly encircled the upper lobe vein and took this with a vascular staple fire.  Finally, I completed the anterior minor fissure between the upper and middle lobe with multiple parenchymal staple fires.  This finally detached the upper lobe completely.  After this, the pleural space overlying station 2R and 4R was opened.  I then dissected out station 2R lymph node packet and sent this off for pathology and in a similar fashion dissected out station 4R lymph node packet without issue.  This pleura was then reapproximated with a running 2 OV lock suture.  All areas, were then inspected for appropriate hemostasis and all areas were hemostatic.  I then placed the lobe in a specimen retrieval bag and removed this from the most anterior port.  All robotic ports were then undocked.  All trocar sites were then visualized and had appropriate hemostasis.  Finally, a 24 Jamaican Linwood drain was placed in the second most anterior incision and sutured to the skin with 0 Ethibond suture.  And then under direct vision, the middle and lower lobe were reinflated specifically paying attention to make sure the middle lobe did not have an aspect of torsion.  All reinflation occurred appropriate and without issue.  After this, all incisions were closed in multiple layers first with a 0 Vicryl, then a 2-0 Vicryl and finally with a 4-0 Monocryl.  And we placed glue on the skin incisions.     The sponge, needle, and instrument counts were correct at the end of the operation.  The patient was awakened from anesthesia, was extubated without incident, and was transported to the Post Anesthesia Care Unit in stable condition.    Estimated Blood Loss:   73cc           Drains: 24 Fr chest tube on - 20 suction                  Specimens:   ID Type Source Tests Collected by Time   A : 8R Tissue Lymph Node TISSUE PATHOLOGY EXAM Zuhair White MD PhD 7/31/2023 0938   B : STATION 7 Tissue Lymph Node TISSUE PATHOLOGY EXAM Zuhair White MD PhD 7/31/2023 0949   C : 10 R Tissue Lymph Node TISSUE PATHOLOGY EXAM Zuhair White MD PhD 7/31/2023 0954   D : 11 R Tissue Lymph Node TISSUE PATHOLOGY EXAM Zuhair White MD PhD 7/31/2023 1024   E : 12 R Tissue Lymph Node TISSUE PATHOLOGY EXAM Zuhair White MD PhD 7/31/2023 1121   F : 2 R Tissue Lymph Node TISSUE PATHOLOGY EXAM Zuhair White MD PhD 7/31/2023 1124   G : 4 R Tissue Lymph Node TISSUE PATHOLOGY EXAM Zuhair White MD PhD 7/31/2023 1125   H : RIGHT UPPER LOBE Tissue Lung, Right Upper Lobe TISSUE PATHOLOGY EXAM Zuhair White MD PhD 7/31/2023 1139   I : 10 R #2 Tissue Lymph Node TISSUE PATHOLOGY EXAM Zuhair White MD PhD 7/31/2023 1154              Implants:   Implant Name Type Inv. Item Serial No.  Lot No. LRB No. Used Action   HEMOST ABS SURGICEL 2X14 - [id number] Implant HEMOST ABS SURGICEL 2X14  [employer]  DIV [employer] J [employer] J [id number] Right 1 Implanted   DEV WND/CLS CONTRL TISS STRATAFIX SPIRAL PD SH 2-0 1/2 15CM - [id number] Implant DEV WND/CLS CONTRL TISS STRATAFIX SPIRAL PD SH 2-0 1/2 15CM  [employer] ENDO [employer]  DIV OF J AND J [id number] Right 1 Implanted   RELOAD STPLR ENDOWRIST 30 DAVINCI/X/XI 6ROW 4.3 GRN 1P/U - [id number] Implant RELOAD STPLR ENDOWRIST 30 DAVINCI/X/XI 6ROW 4.3 GRN 1P/U  INTUITIVE SURGICAL [id number] Right 1 Implanted   RELOAD STPLR SUREFORM 45 DAVINCI/X/XI 6ROW 4.3 GRN 1P/U - [id number] Implant RELOAD STPLR SUREFORM 45 DAVINCI/X/XI 6ROW 4.3 GRN 1P/U  INTUITIVE SURGICAL [id number] Right 1 Implanted   RELOAD STPLR SUREFORM 45 DAVINCI/X/XI 6ROW 4.3 GRN 1P/U - [id number] Implant RELOAD STPLR SUREFORM 45 DAVINCI/X/XI 6ROW 4.3 GRN 1P/U  INTUITIVE SURGICAL [id number] Right 1 Implanted   RELOAD STPLR SUREFORM 45 DAVINCI/X/XI 6ROW 4.3 GRN 1P/U -  [id number] Implant RELOAD STPLR SUREFORM 45 DAVINCI/X/XI 6ROW 4.3 GRN 1P/U  INTUITIVE SURGICAL [id number] Right 1 Implanted   RELOAD STPLR SUREFORM 45 DAVINCI/X/XI 6ROW 4.3 GRN 1P/U - [id number] Implant RELOAD STPLR SUREFORM 45 DAVINCI/X/XI 6ROW 4.3 GRN 1P/U  INTUITIVE SURGICAL [id number] Right 1 Implanted   RELOAD STPLR SUREFORM 45 DAVINCI/X/XI 6ROW 4.3 GRN 1P/U - [id number] Implant RELOAD STPLR SUREFORM 45 DAVINCI/X/XI 6ROW 4.3 GRN 1P/U  INTUITIVE SURGICAL [id number] Right 1 Implanted   RELOAD STPLR SUREFORM 45 DAVINCI/X/XI 6ROW 4.3 GRN 1P/U - [id number] Implant RELOAD STPLR SUREFORM 45 DAVINCI/X/XI 6ROW 4.3 GRN 1P/U  INTUITIVE SURGICAL [id number] Right 1 Implanted   RELOAD STPLR ENDOWRIST 30 DAVINCI/X/XI 6ROW 2.5 WHT 1P/U - [id number] Implant RELOAD STPLR ENDOWRIST 30 DAVINCI/X/XI 6ROW 2.5 WHT 1P/U  INTUITIVE SURGICAL [id number] Right 1 Implanted   RELOAD STPLR ENDOWRIST 30 DAVINCI/X/XI 6ROW 2.5 WHT 1P/U - [id number] Implant RELOAD STPLR ENDOWRIST 30 DAVINCI/X/XI 6ROW 2.5 WHT 1P/U  INTUITIVE SURGICAL [id number] Right 1 Implanted   RELOAD STPLR ENDOWRIST 30 DAVINCI/X/XI 6ROW 2.5 WHT 1P/U - [id number] Implant RELOAD STPLR ENDOWRIST 30 DAVINCI/X/XI 6ROW 2.5 WHT 1P/U  INTUITIVE SURGICAL [id number] Right 1 Implanted   RELOAD STPLR ENDOWRIST 30 DAVINCI/X/XI 6ROW 2.5 WHT 1P/U - [id number] Implant RELOAD STPLR ENDOWRIST 30 DAVINCI/X/XI 6ROW 2.5 WHT 1P/U  INTUITIVE SURGICAL [id number] Right 1 Implanted   RELOAD STPLR SUREFORM 45 DAVINCI/X/XI 6ROW 4.3 GRN 1P/U - [id number] Implant RELOAD STPLR SUREFORM 45 DAVINCI/X/XI 6ROW 4.3 GRN 1P/U  INTUITIVE SURGICAL [id number] Right 1 Implanted   RELOAD STPLR SUREFORM 45 DAVINCI/X/XI 6ROW 4.3 GRN 1P/U - [id number] Implant RELOAD STPLR SUREFORM 45 DAVINCI/X/XI 6ROW 4.3 GRN 1P/U  INTUITIVE SURGICAL [id number] Right 1 Implanted   RELOAD STPLR ENDOWRIST 30 DAVINCI/X/XI 6ROW 2.5 WHT 1P/U - [id number] Implant RELOAD STPLR ENDOWRIST 30 DAVINCI/X/XI 6ROW 2.5 WHT 1P/U  INTUITIVE SURGICAL [id number]  Right 1 Implanted              Complications: None           Disposition: PACU - hemodynamically stable.           Condition: Stable     Zuhair White MD, PhD

## 2023-08-01 ENCOUNTER — APPOINTMENT (OUTPATIENT)
Dept: GENERAL RADIOLOGY | Facility: HOSPITAL | Age: 83
DRG: 164 | End: 2023-08-01
Payer: MEDICARE

## 2023-08-01 LAB
ANION GAP SERPL CALCULATED.3IONS-SCNC: 10.2 MMOL/L (ref 5–15)
BASOPHILS # BLD AUTO: 0.02 10*3/MM3 (ref 0–0.2)
BASOPHILS NFR BLD AUTO: 0.1 % (ref 0–1.5)
BUN SERPL-MCNC: 25 MG/DL (ref 8–23)
BUN/CREAT SERPL: 25.5 (ref 7–25)
CALCIUM SPEC-SCNC: 8.1 MG/DL (ref 8.6–10.5)
CHLORIDE SERPL-SCNC: 102 MMOL/L (ref 98–107)
CO2 SERPL-SCNC: 23.8 MMOL/L (ref 22–29)
CREAT SERPL-MCNC: 0.98 MG/DL (ref 0.76–1.27)
DEPRECATED RDW RBC AUTO: 44.2 FL (ref 37–54)
EGFRCR SERPLBLD CKD-EPI 2021: 77.5 ML/MIN/1.73
EOSINOPHIL # BLD AUTO: 0 10*3/MM3 (ref 0–0.4)
EOSINOPHIL NFR BLD AUTO: 0 % (ref 0.3–6.2)
ERYTHROCYTE [DISTWIDTH] IN BLOOD BY AUTOMATED COUNT: 13.6 % (ref 12.3–15.4)
GLUCOSE SERPL-MCNC: 158 MG/DL (ref 65–99)
HCT VFR BLD AUTO: 36.4 % (ref 37.5–51)
HGB BLD-MCNC: 12.4 G/DL (ref 13–17.7)
IMM GRANULOCYTES # BLD AUTO: 0.1 10*3/MM3 (ref 0–0.05)
IMM GRANULOCYTES NFR BLD AUTO: 0.6 % (ref 0–0.5)
LYMPHOCYTES # BLD AUTO: 0.73 10*3/MM3 (ref 0.7–3.1)
LYMPHOCYTES NFR BLD AUTO: 4.1 % (ref 19.6–45.3)
MCH RBC QN AUTO: 30.4 PG (ref 26.6–33)
MCHC RBC AUTO-ENTMCNC: 34.1 G/DL (ref 31.5–35.7)
MCV RBC AUTO: 89.2 FL (ref 79–97)
MONOCYTES # BLD AUTO: 1.89 10*3/MM3 (ref 0.1–0.9)
MONOCYTES NFR BLD AUTO: 10.6 % (ref 5–12)
NEUTROPHILS NFR BLD AUTO: 15.07 10*3/MM3 (ref 1.7–7)
NEUTROPHILS NFR BLD AUTO: 84.6 % (ref 42.7–76)
NRBC BLD AUTO-RTO: 0 /100 WBC (ref 0–0.2)
PLATELET # BLD AUTO: 182 10*3/MM3 (ref 140–450)
PMV BLD AUTO: 11.1 FL (ref 6–12)
POTASSIUM SERPL-SCNC: 4.5 MMOL/L (ref 3.5–5.2)
QT INTERVAL: 377 MS
RBC # BLD AUTO: 4.08 10*6/MM3 (ref 4.14–5.8)
SODIUM SERPL-SCNC: 136 MMOL/L (ref 136–145)
WBC NRBC COR # BLD: 17.81 10*3/MM3 (ref 3.4–10.8)

## 2023-08-01 PROCEDURE — 94640 AIRWAY INHALATION TREATMENT: CPT

## 2023-08-01 PROCEDURE — 80048 BASIC METABOLIC PNL TOTAL CA: CPT | Performed by: STUDENT IN AN ORGANIZED HEALTH CARE EDUCATION/TRAINING PROGRAM

## 2023-08-01 PROCEDURE — 94799 UNLISTED PULMONARY SVC/PX: CPT

## 2023-08-01 PROCEDURE — 99024 POSTOP FOLLOW-UP VISIT: CPT | Performed by: NURSE PRACTITIONER

## 2023-08-01 PROCEDURE — 94761 N-INVAS EAR/PLS OXIMETRY MLT: CPT

## 2023-08-01 PROCEDURE — 85025 COMPLETE CBC W/AUTO DIFF WBC: CPT | Performed by: STUDENT IN AN ORGANIZED HEALTH CARE EDUCATION/TRAINING PROGRAM

## 2023-08-01 PROCEDURE — 25010000002 ENOXAPARIN PER 10 MG: Performed by: STUDENT IN AN ORGANIZED HEALTH CARE EDUCATION/TRAINING PROGRAM

## 2023-08-01 PROCEDURE — 94760 N-INVAS EAR/PLS OXIMETRY 1: CPT

## 2023-08-01 PROCEDURE — 71045 X-RAY EXAM CHEST 1 VIEW: CPT

## 2023-08-01 RX ORDER — IPRATROPIUM BROMIDE AND ALBUTEROL SULFATE 2.5; .5 MG/3ML; MG/3ML
3 SOLUTION RESPIRATORY (INHALATION)
Status: DISCONTINUED | OUTPATIENT
Start: 2023-08-01 | End: 2023-08-07 | Stop reason: HOSPADM

## 2023-08-01 RX ADMIN — VENLAFAXINE HYDROCHLORIDE 150 MG: 150 CAPSULE, EXTENDED RELEASE ORAL at 21:32

## 2023-08-01 RX ADMIN — ASPIRIN 81 MG: 81 TABLET, COATED ORAL at 21:33

## 2023-08-01 RX ADMIN — ENOXAPARIN SODIUM 40 MG: 100 INJECTION SUBCUTANEOUS at 08:21

## 2023-08-01 RX ADMIN — ACETAMINOPHEN 1000 MG: 500 TABLET, FILM COATED ORAL at 21:32

## 2023-08-01 RX ADMIN — CELECOXIB 100 MG: 100 CAPSULE ORAL at 21:33

## 2023-08-01 RX ADMIN — DILTIAZEM HYDROCHLORIDE 30 MG: 30 TABLET, FILM COATED ORAL at 05:30

## 2023-08-01 RX ADMIN — GABAPENTIN 300 MG: 300 CAPSULE ORAL at 14:10

## 2023-08-01 RX ADMIN — OXYCODONE HYDROCHLORIDE 5 MG: 5 TABLET ORAL at 21:32

## 2023-08-01 RX ADMIN — DILTIAZEM HYDROCHLORIDE 30 MG: 30 TABLET, FILM COATED ORAL at 23:59

## 2023-08-01 RX ADMIN — ACETAMINOPHEN 1000 MG: 500 TABLET, FILM COATED ORAL at 08:21

## 2023-08-01 RX ADMIN — IPRATROPIUM BROMIDE AND ALBUTEROL SULFATE 3 ML: 2.5; .5 SOLUTION RESPIRATORY (INHALATION) at 11:26

## 2023-08-01 RX ADMIN — ALPRAZOLAM 0.25 MG: 0.25 TABLET ORAL at 23:59

## 2023-08-01 RX ADMIN — DILTIAZEM HYDROCHLORIDE 30 MG: 30 TABLET, FILM COATED ORAL at 17:00

## 2023-08-01 RX ADMIN — DILTIAZEM HYDROCHLORIDE 30 MG: 30 TABLET, FILM COATED ORAL at 11:32

## 2023-08-01 RX ADMIN — GABAPENTIN 300 MG: 300 CAPSULE ORAL at 21:33

## 2023-08-01 RX ADMIN — IPRATROPIUM BROMIDE AND ALBUTEROL SULFATE 3 ML: 2.5; .5 SOLUTION RESPIRATORY (INHALATION) at 20:53

## 2023-08-01 RX ADMIN — ACETAMINOPHEN 1000 MG: 500 TABLET, FILM COATED ORAL at 17:00

## 2023-08-01 RX ADMIN — CELECOXIB 100 MG: 100 CAPSULE ORAL at 08:21

## 2023-08-01 RX ADMIN — GABAPENTIN 300 MG: 300 CAPSULE ORAL at 05:30

## 2023-08-01 RX ADMIN — IPRATROPIUM BROMIDE AND ALBUTEROL SULFATE 3 ML: 2.5; .5 SOLUTION RESPIRATORY (INHALATION) at 15:57

## 2023-08-01 RX ADMIN — ATORVASTATIN CALCIUM 40 MG: 20 TABLET, FILM COATED ORAL at 21:32

## 2023-08-01 RX ADMIN — HYDROCHLOROTHIAZIDE 25 MG: 25 TABLET ORAL at 21:33

## 2023-08-01 NOTE — PLAN OF CARE
Goal Outcome Evaluation:  Plan of Care Reviewed With: patient        Progress: no change  Outcome Evaluation: VSS, increased pneumo on chest xray, chest tube placed back to suction. Up x1 assist.

## 2023-08-01 NOTE — PROGRESS NOTES
"  POST-OPERATIVE NOTE     Chief Complaint: Adenocarcinoma of the right upper lobe of the lung, postoperative care  S/P: Bronchoscopy, right video-assisted thoracoscopy with da Rosalie robot assisted right upper lobectomy, mediastinal lymph node dissection, intercostal nerve block  POD # 1    Subjective:  Symptoms:  Stable.  No shortness of breath.    Diet:  Adequate intake.  No nausea or vomiting.    Activity level: Returning to normal.      Mr. De La Torre is doing very well today.  He is up to the chair and denies significant pain.  He is eager to ambulate.    Objective:  General Appearance:  Comfortable and in no acute distress.    Vital signs: (most recent): Blood pressure 142/57, pulse 97, temperature 99.5 øF (37.5 øC), temperature source Oral, resp. rate 18, height 177.8 cm (70\"), weight 89.9 kg (198 lb 3.1 oz), SpO2 97 %.  Vital signs are normal.  No fever.    Lungs:  Normal effort and normal respiratory rate.  No rales, wheezes or rhonchi.    Heart: Regular rhythm.    Chest: Chest wall tenderness present.    Pulses: Distal pulses are intact.    Neurological: Patient is alert and oriented to person, place and time.    Skin:  Warm and dry.              Chest tube:   Site: Right, Clean, Dry, Intact, and Securement device intact  Suction: -20 cm  Air Leak: positive  24 Hour Total: 10mL    Results Review:     I reviewed the patient's new clinical results.  I reviewed the patient's new imaging results and agree with the interpretation.  I reviewed the patient's other test results and agree with the interpretation  Discussed with patient, family at bedside, RN, Dr. White.    Assessment & Plan     Mr. De La Torre is doing very well today.  He is eager to ambulate and promote recovery.  He denies significant chest wall pain today.  Chest x-ray this morning demonstrated increase in pleural separation and the patient does have an air leak on assessment today so we will leave his chest tube to -20 cm suction today.  Repeat chest " x-ray in AM.  Stop IV fluids.  Diet as tolerated.  DC Elizondo.  Continue to encourage good pulmonary hygiene, incentive spirometry and ambulation as tolerated.  Final surgical pathology is pending.    MAREN Eisenberg  [facility]  08/01/23  15:13 EDT    Patient was seen and assessed while wearing personal protective equipment including facemask, protective eyewear and gloves.  Hand hygiene performed prior to entering the room and upon exiting with doffing of gloves.

## 2023-08-01 NOTE — PLAN OF CARE
Problem: Adult Inpatient Plan of Care  Goal: Plan of Care Review  Flowsheets (Taken 8/1/2023 0445)  Progress: improving  Plan of Care Reviewed With: patient  Outcome Evaluation: vital signs stable chest tube to waterseal at 0300 air leak noted drainage is serosanguineous sat in chair denies pain only has had the eras meds urine output is marginal encouraged po fluids   Goal Outcome Evaluation:  Plan of Care Reviewed With: patient        Progress: improving  Outcome Evaluation: vital signs stable chest tube to waterseal at 0300 air leak noted drainage is serosanguineous sat in chair denies pain only has had the eras meds urine output is marginal encouraged po fluids

## 2023-08-02 ENCOUNTER — APPOINTMENT (OUTPATIENT)
Dept: GENERAL RADIOLOGY | Facility: HOSPITAL | Age: 83
DRG: 164 | End: 2023-08-02
Payer: MEDICARE

## 2023-08-02 LAB
LAB AP CASE REPORT: NORMAL
LAB AP DIAGNOSIS COMMENT: NORMAL
LAB AP SYNOPTIC CHECKLIST: NORMAL
PATH REPORT.FINAL DX SPEC: NORMAL
PATH REPORT.GROSS SPEC: NORMAL

## 2023-08-02 PROCEDURE — 71045 X-RAY EXAM CHEST 1 VIEW: CPT

## 2023-08-02 PROCEDURE — 25010000002 ENOXAPARIN PER 10 MG: Performed by: STUDENT IN AN ORGANIZED HEALTH CARE EDUCATION/TRAINING PROGRAM

## 2023-08-02 PROCEDURE — 94799 UNLISTED PULMONARY SVC/PX: CPT

## 2023-08-02 PROCEDURE — 94761 N-INVAS EAR/PLS OXIMETRY MLT: CPT

## 2023-08-02 PROCEDURE — 94760 N-INVAS EAR/PLS OXIMETRY 1: CPT

## 2023-08-02 PROCEDURE — 94664 DEMO&/EVAL PT USE INHALER: CPT

## 2023-08-02 PROCEDURE — 99024 POSTOP FOLLOW-UP VISIT: CPT

## 2023-08-02 RX ORDER — CHOLECALCIFEROL (VITAMIN D3) 125 MCG
5 CAPSULE ORAL NIGHTLY
Status: DISCONTINUED | OUTPATIENT
Start: 2023-08-02 | End: 2023-08-07 | Stop reason: HOSPADM

## 2023-08-02 RX ADMIN — IPRATROPIUM BROMIDE AND ALBUTEROL SULFATE 3 ML: 2.5; .5 SOLUTION RESPIRATORY (INHALATION) at 11:11

## 2023-08-02 RX ADMIN — IPRATROPIUM BROMIDE AND ALBUTEROL SULFATE 3 ML: 2.5; .5 SOLUTION RESPIRATORY (INHALATION) at 08:25

## 2023-08-02 RX ADMIN — DILTIAZEM HYDROCHLORIDE 30 MG: 30 TABLET, FILM COATED ORAL at 07:25

## 2023-08-02 RX ADMIN — ATORVASTATIN CALCIUM 40 MG: 20 TABLET, FILM COATED ORAL at 21:25

## 2023-08-02 RX ADMIN — HYDROCHLOROTHIAZIDE 25 MG: 25 TABLET ORAL at 21:25

## 2023-08-02 RX ADMIN — CELECOXIB 100 MG: 100 CAPSULE ORAL at 08:38

## 2023-08-02 RX ADMIN — CELECOXIB 100 MG: 100 CAPSULE ORAL at 21:25

## 2023-08-02 RX ADMIN — DILTIAZEM HYDROCHLORIDE 30 MG: 30 TABLET, FILM COATED ORAL at 11:50

## 2023-08-02 RX ADMIN — ASPIRIN 81 MG: 81 TABLET, COATED ORAL at 21:25

## 2023-08-02 RX ADMIN — DILTIAZEM HYDROCHLORIDE 30 MG: 30 TABLET, FILM COATED ORAL at 23:30

## 2023-08-02 RX ADMIN — ENOXAPARIN SODIUM 40 MG: 100 INJECTION SUBCUTANEOUS at 08:38

## 2023-08-02 RX ADMIN — ACETAMINOPHEN 1000 MG: 500 TABLET, FILM COATED ORAL at 17:09

## 2023-08-02 RX ADMIN — OXYCODONE HYDROCHLORIDE 5 MG: 5 TABLET ORAL at 04:44

## 2023-08-02 RX ADMIN — ACETAMINOPHEN 1000 MG: 500 TABLET, FILM COATED ORAL at 21:25

## 2023-08-02 RX ADMIN — ALPRAZOLAM 0.25 MG: 0.25 TABLET ORAL at 21:25

## 2023-08-02 RX ADMIN — VENLAFAXINE HYDROCHLORIDE 150 MG: 150 CAPSULE, EXTENDED RELEASE ORAL at 21:25

## 2023-08-02 RX ADMIN — OXYCODONE HYDROCHLORIDE 5 MG: 5 TABLET ORAL at 21:25

## 2023-08-02 RX ADMIN — IPRATROPIUM BROMIDE AND ALBUTEROL SULFATE 3 ML: 2.5; .5 SOLUTION RESPIRATORY (INHALATION) at 19:26

## 2023-08-02 RX ADMIN — ACETAMINOPHEN 1000 MG: 500 TABLET, FILM COATED ORAL at 08:38

## 2023-08-02 RX ADMIN — IPRATROPIUM BROMIDE AND ALBUTEROL SULFATE 3 ML: 2.5; .5 SOLUTION RESPIRATORY (INHALATION) at 15:34

## 2023-08-02 RX ADMIN — DILTIAZEM HYDROCHLORIDE 30 MG: 30 TABLET, FILM COATED ORAL at 17:09

## 2023-08-02 NOTE — PLAN OF CARE
Problem: Adult Inpatient Plan of Care  Goal: Plan of Care Review  Flowsheets (Taken 8/2/2023 0310)  Progress: improving  Plan of Care Reviewed With: patient  Outcome Evaluation: chest tube to -20 of suction air leak noted and serosanguineous drainage noted medicated for pain with good results and for anxiety with good results   Goal Outcome Evaluation:  Plan of Care Reviewed With: patient        Progress: improving  Outcome Evaluation: chest tube to -20 of suction air leak noted and serosanguineous drainage noted medicated for pain with good results and for anxiety with good results

## 2023-08-02 NOTE — PROGRESS NOTES
"  POST-OPERATIVE NOTE     Chief Complaint: Adenocarcinoma of the right upper lobe of the lung, postoperative care  S/P: Bronchoscopy, right video-assisted thoracoscopy with da Rosalie robot assisted right upper lobectomy, mediastinal lymph node dissection, intercostal nerve block  POD # 1    Subjective:  Symptoms:  Improved.  He reports chest pain (Incisional, Well controlled).  No shortness of breath, cough or weakness.    Diet:  Adequate intake.  No nausea or vomiting.    Activity level: Returning to normal.    Pain:  He complains of pain that is mild.  Pain is well controlled.      Mr. De La Torre is doing very well today.  He is up to the chair and denies significant pain.  He is eager to ambulate.    Objective:  General Appearance:  Comfortable, in no acute distress and well-appearing.    Vital signs: (most recent): Blood pressure 150/90, pulse 94, temperature 97.4 øF (36.3 øC), temperature source Oral, resp. rate 18, height 177.8 cm (70\"), weight 89.9 kg (198 lb 3.1 oz), SpO2 99 %.  Vital signs are normal.  No fever.    Lungs:  Normal effort and normal respiratory rate.  No rales, wheezes or rhonchi.    Heart: Regular rhythm.    Chest: Symmetric chest wall expansion. Chest wall tenderness present.    Abdomen: Abdomen is non-distended.    Pulses: Distal pulses are intact.    Neurological: Patient is alert and oriented to person, place and time.    Skin:  Warm and dry.              Chest tube:   Site: Right, Clean, Dry, Intact, and Securement device intact  Suction: -20 cm  Air Leak: positive  24 Hour Total: 170mL    Results Review:     I reviewed the patient's new clinical results.  I reviewed the patient's new imaging results and agree with the interpretation.  I reviewed the patient's other test results and agree with the interpretation  Discussed with patient, family at bedside, RN, Dr. Dumont.    Assessment & Plan     Mr. De La Torre continues to do well postoperatively.  He denies any uncontrolled pain or shortness " of breath.  I have independently reviewed this morning's chest x-ray which demonstrates improving subcutaneous emphysema localized to the right chest wall, not unexpected postoperatively.  There is no pneumothorax or acute airspace disease.  The right-sided chest tube appears in satisfactory position.    Small airleak noted on exam.  We will transition the chest tube to waterseal with a repeat chest x-ray this afternoon.  If without significant change, continue chest tube to waterseal and repeat a chest x-ray in the morning. Encourage good pulmonary hygiene including diligent use of I-S.  Increase activity with scheduled walks around the nurses station 3 times daily.  Awaiting final surgical path.    MAREN Saxena  [facility]  08/02/23  13:29 EDT    Patient was seen and assessed while wearing personal protective equipment including facemask, protective eyewear and gloves.  Hand hygiene performed prior to entering the room and upon exiting with doffing of gloves.

## 2023-08-02 NOTE — CASE MANAGEMENT/SOCIAL WORK
Discharge Planning Assessment  [facility]     Patient Name: Sohan De La Torre  MRN: [id number]  Today's Date: 8/2/2023    Admit Date: 7/31/2023    Plan: Home   Discharge Needs Assessment       Row Name 08/02/23 1328       Living Environment    People in Home alone    Current Living Arrangements home    Potentially Unsafe Housing Conditions none    Primary Care Provided by self    Provides Primary Care For no one    Family Caregiver if Needed child(sammy), adult    Family Caregiver Names Daughter, Ny [phone] and SonMarlo [phone]    Quality of Family Relationships helpful;involved;supportive    Able to Return to Prior Arrangements yes       Resource/Environmental Concerns    Resource/Environmental Concerns none       Transition Planning    Patient/Family Anticipates Transition to home    Patient/Family Anticipated Services at Transition none    Transportation Anticipated family or friend will provide       Discharge Needs Assessment    Equipment Currently Used at Home cane, straight    Concerns to be Addressed discharge planning                   Discharge Plan       Row Name 08/02/23 1324       Plan    Plan Home    Patient/Family in Agreement with Plan yes    Plan Comments CCP spoke with patient at bedside; CCP role explained, face sheet verified, and discharge plan discussed. Patient resides alone in three-level home with one step to enter through the front. Patient states he uses a cane to ambulate sometimes but denies any issues ambulating steps or around home. Patient reports being independent with ADLs. Confirms Meds to Beds. Patient has used [facility] in the past and denies any SNF history. Patient denies any known discharge needs at this time and plans to return home. Family to transport at discharge. CCP to follow should any discharge needs arise. Kayla WU LCSW                  Continued Care and Services - Admitted Since 7/31/2023    Coordination has not been started for this encounter.           Demographic Summary       Row Name 08/02/23 1324       General Information    Admission Type inpatient    Arrived From home    Reason for Consult discharge planning    Preferred Language English                   Functional Status       Row Name 08/02/23 1325       Functional Status    Usual Activity Tolerance good    Current Activity Tolerance good       Functional Status, IADL    Medications independent    Meal Preparation independent    Housekeeping independent    Laundry independent    Shopping independent       Mental Status    General Appearance WDL WDL                   Psychosocial    No documentation.                  Abuse/Neglect    No documentation.                  Legal    No documentation.                  Substance Abuse    No documentation.                  Patient Forms    No documentation.                     Kayla WU, RAZW

## 2023-08-02 NOTE — PLAN OF CARE
Goal Outcome Evaluation:  Plan of Care Reviewed With: patient        Progress: improving  Outcome Evaluation: VSS, chest tube to waterseal, up x1 assist.

## 2023-08-03 ENCOUNTER — APPOINTMENT (OUTPATIENT)
Dept: GENERAL RADIOLOGY | Facility: HOSPITAL | Age: 83
DRG: 164 | End: 2023-08-03
Payer: MEDICARE

## 2023-08-03 PROCEDURE — 97116 GAIT TRAINING THERAPY: CPT

## 2023-08-03 PROCEDURE — 99024 POSTOP FOLLOW-UP VISIT: CPT

## 2023-08-03 PROCEDURE — 94799 UNLISTED PULMONARY SVC/PX: CPT

## 2023-08-03 PROCEDURE — 71045 X-RAY EXAM CHEST 1 VIEW: CPT

## 2023-08-03 PROCEDURE — 97162 PT EVAL MOD COMPLEX 30 MIN: CPT

## 2023-08-03 PROCEDURE — 94761 N-INVAS EAR/PLS OXIMETRY MLT: CPT

## 2023-08-03 PROCEDURE — 25010000002 ENOXAPARIN PER 10 MG: Performed by: STUDENT IN AN ORGANIZED HEALTH CARE EDUCATION/TRAINING PROGRAM

## 2023-08-03 PROCEDURE — 94664 DEMO&/EVAL PT USE INHALER: CPT

## 2023-08-03 PROCEDURE — 97530 THERAPEUTIC ACTIVITIES: CPT

## 2023-08-03 RX ORDER — LORAZEPAM 2 MG/ML
2 INJECTION INTRAMUSCULAR
Status: DISCONTINUED | OUTPATIENT
Start: 2023-08-03 | End: 2023-08-07 | Stop reason: HOSPADM

## 2023-08-03 RX ORDER — LORAZEPAM 2 MG/ML
1 INJECTION INTRAMUSCULAR
Status: DISCONTINUED | OUTPATIENT
Start: 2023-08-03 | End: 2023-08-07 | Stop reason: HOSPADM

## 2023-08-03 RX ORDER — DIAZEPAM 2 MG/1
2 TABLET ORAL EVERY 6 HOURS PRN
Status: DISCONTINUED | OUTPATIENT
Start: 2023-08-03 | End: 2023-08-07 | Stop reason: HOSPADM

## 2023-08-03 RX ORDER — LORAZEPAM 1 MG/1
1 TABLET ORAL
Status: DISCONTINUED | OUTPATIENT
Start: 2023-08-03 | End: 2023-08-07 | Stop reason: HOSPADM

## 2023-08-03 RX ORDER — LORAZEPAM 1 MG/1
2 TABLET ORAL
Status: DISCONTINUED | OUTPATIENT
Start: 2023-08-03 | End: 2023-08-07 | Stop reason: HOSPADM

## 2023-08-03 RX ADMIN — IPRATROPIUM BROMIDE AND ALBUTEROL SULFATE 3 ML: 2.5; .5 SOLUTION RESPIRATORY (INHALATION) at 08:07

## 2023-08-03 RX ADMIN — OXYCODONE HYDROCHLORIDE 5 MG: 5 TABLET ORAL at 21:52

## 2023-08-03 RX ADMIN — IPRATROPIUM BROMIDE AND ALBUTEROL SULFATE 3 ML: 2.5; .5 SOLUTION RESPIRATORY (INHALATION) at 20:34

## 2023-08-03 RX ADMIN — ENOXAPARIN SODIUM 40 MG: 100 INJECTION SUBCUTANEOUS at 08:53

## 2023-08-03 RX ADMIN — ACETAMINOPHEN 1000 MG: 500 TABLET, FILM COATED ORAL at 08:53

## 2023-08-03 RX ADMIN — ATORVASTATIN CALCIUM 40 MG: 20 TABLET, FILM COATED ORAL at 21:52

## 2023-08-03 RX ADMIN — IPRATROPIUM BROMIDE AND ALBUTEROL SULFATE 3 ML: 2.5; .5 SOLUTION RESPIRATORY (INHALATION) at 15:54

## 2023-08-03 RX ADMIN — HYDROCHLOROTHIAZIDE 25 MG: 25 TABLET ORAL at 21:52

## 2023-08-03 RX ADMIN — DILTIAZEM HYDROCHLORIDE 30 MG: 30 TABLET, FILM COATED ORAL at 11:31

## 2023-08-03 RX ADMIN — CELECOXIB 100 MG: 100 CAPSULE ORAL at 21:52

## 2023-08-03 RX ADMIN — DILTIAZEM HYDROCHLORIDE 30 MG: 30 TABLET, FILM COATED ORAL at 23:06

## 2023-08-03 RX ADMIN — VENLAFAXINE HYDROCHLORIDE 150 MG: 150 CAPSULE, EXTENDED RELEASE ORAL at 21:52

## 2023-08-03 RX ADMIN — ACETAMINOPHEN 1000 MG: 500 TABLET, FILM COATED ORAL at 17:31

## 2023-08-03 RX ADMIN — IPRATROPIUM BROMIDE AND ALBUTEROL SULFATE 3 ML: 2.5; .5 SOLUTION RESPIRATORY (INHALATION) at 11:14

## 2023-08-03 RX ADMIN — ASPIRIN 81 MG: 81 TABLET, COATED ORAL at 21:52

## 2023-08-03 RX ADMIN — CELECOXIB 100 MG: 100 CAPSULE ORAL at 08:53

## 2023-08-03 RX ADMIN — DILTIAZEM HYDROCHLORIDE 30 MG: 30 TABLET, FILM COATED ORAL at 07:21

## 2023-08-03 RX ADMIN — DIAZEPAM 2 MG: 2 TABLET ORAL at 21:52

## 2023-08-03 RX ADMIN — DILTIAZEM HYDROCHLORIDE 30 MG: 30 TABLET, FILM COATED ORAL at 17:31

## 2023-08-03 RX ADMIN — ACETAMINOPHEN 1000 MG: 500 TABLET, FILM COATED ORAL at 21:50

## 2023-08-03 NOTE — PLAN OF CARE
Goal Outcome Evaluation:  Plan of Care Reviewed With: patient           Outcome Evaluation: 83yo white male admitted 7/31/23 with lung mass; now s/p BRONCHOSCOPY, THORACOSCOPY WITH RIGHT UPPER LOBECTOMY, MEDIASTINAL LYMPH NODE DISSECTION, INTERCOSTAL NERVE BLOCK 7/31/23. PMH includes congenital spine anomaly, hbp, L LE ischemia and failed vascular bypass. PLOF: Pt lives at home alone in [facility] with 2 ABBY and a flight each between floors. He was independent with ADLs and used a cane for mobility. Today, he was found resting in bed in [place], pleasant and cooperative and eager to move. He was essentially independent with bed mobility and stood from EOB with SBA. Pt amb 200' with SBA/CGA without AD - quick pace, no LOB. He negotiated 9 stairs with handrail and SBA using reciprocal gt pattern. He retuirned supine in bed with SBA. He is safe to return home. Pt was encouraged to amb with nsg staff throughout the day. Will sign off      Anticipated Discharge Disposition (PT): home with home health (if he wants it)

## 2023-08-03 NOTE — PLAN OF CARE
Goal Outcome Evaluation:   VSS. A&OX4. Pain managed with PRN pain medication. Up with assist x1. Right chest tube remains to water seal. Call light in reach.

## 2023-08-03 NOTE — PROGRESS NOTES
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  He reports chest pain (Incisional, Well controlled).  No shortness of breath, cough or weakness.    Diet:  Adequate intake.  No nausea or vomiting.    Activity level: Returning to normal.    Pain:  He complains of pain that is mild.  Pain is well controlled.        Objective:  General Appearance:  Comfortable, in no acute distress and well-appearing.    Vital signs: (most recent): Blood pressure 163/89, pulse 87, temperature 98.5 øF (36.9 øC), temperature source Oral, resp. rate 18, height 177.8 cm (70\"), weight 89.9 kg (198 lb 3.1 oz), SpO2 99 %.  Vital signs are normal.  No fever.    Lungs:  Normal effort and normal respiratory rate.  No rales, wheezes or rhonchi.    Heart: Regular rhythm.    Chest: Symmetric chest wall expansion. Chest wall tenderness present.    Abdomen: Abdomen is non-distended.    Pulses: Distal pulses are intact.    Neurological: Patient is alert and oriented to person, place and time.    Skin:  Warm and dry.              Chest tube:   Site: Right, Clean, Dry, Intact, and Securement device intact  Suction: waterseal  Air Leak: positive  24 Hour Total: 100mL    Results Review:     I reviewed the patient's new clinical results.  I reviewed the patient's new imaging results and agree with the interpretation.  I reviewed the patient's other test results and agree with the interpretation  Discussed with patient, family at bedside, RN, Dr. White.    Assessment & Plan     Mr. De La Torre continues to do well postoperatively. His chest tube was placed to waterseal yesterday.  I have independently  reviewed the chest x-ray performed this morning which demonstrates no significant change " to the right apical pleural separation which is not unexpected postoperatively.  The right-sided chest tube is in satisfactory position. He denies any significant pain or shortness of breath.  Continue multimodal analgesic medications. We will transition Xanax to as needed Valium for chest wall spasms.  CIWA monitoring ongoing.    We will continue chest tube to waterseal as a small leak remains present.  Repeat a chest x-ray in the morning.  Encourage good pulmonary hygiene including diligent use of I-S.  Increase activity including scheduled walks around the nurses station 3 times daily.  PT evaluation pending.  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    MAREN Saxena  [facility]  08/03/23  15:34 EDT    Patient was seen and assessed while wearing personal protective equipment including facemask, protective eyewear and gloves.  Hand hygiene performed prior to entering the room and upon exiting with doffing of gloves.

## 2023-08-03 NOTE — PLAN OF CARE
Goal Outcome Evaluation:  Plan of Care Reviewed With: patient        Progress: improving  Outcome Evaluation: VSS, chest tube still to waterseal.

## 2023-08-03 NOTE — THERAPY DISCHARGE NOTE
Patient Name: Sohan De La Torre  : 1940    MRN: [id number]                              Today's Date: 8/3/2023       Admit Date: 2023    Visit Dx:     ICD-10-CM ICD-9-CM   1. 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 of other part of foot    Atherosclerosis of native artery of left lower extremity with gangrene    Atheroscler of nonbiologic bypass graft of extremity with rest pain    Ischemia of left lower extremity    Stenosis of other vascular prosthetic devices, implants and grafts, initial encounter    Failing vascular bypass graft    Temporal arteritis    Lung mass    Non-small cell lung cancer     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     Depression     Femoral thrombosis     Foot pain, left     R/T PVD    Hyperlipidemia     Hypertension     Impaired proprioception     LEFT FOOT    Ischemia of left lower extremity     Low back pain     Lung mass     UPPER RIGHT LOBE    PONV (postoperative nausea and vomiting)     PVD (peripheral vascular disease)     Spondylosis     L5-S1     Past Surgical History:   Procedure Laterality Date    AMPUTATION DIGIT Left 2022    Procedure: LEFT THIRD TOE AMPUTATION;  Surgeon: Sohan Burger MD;  Location: [facility];  Service: Vascular;  Laterality: Left;    ATHRECTOMY ILIAC, FEMORAL, TIBIAL ARTERY Left 2022    Procedure: AORTO LIAC  FEMORAL LEFT POPLITEAL AND ANTERIOR TIBIAL LASER  ATHERECTOMY AND ANGIOPLASTY. LEFT FEMORAL AND POPLITEAL ARTERY STENT PLACEMENT;  Surgeon: Sohan Burger MD;  Location: [facility] OR ;  Service: Vascular;   Laterality: Left;    ATHRECTOMY ILIAC, FEMORAL, TIBIAL ARTERY Left 06/28/2022    Procedure: AIF, Bilateral Lower Extremity Angiogram;  Surgeon: Sohan Burger MD;  Location: [facility] OR 18/19;  Service: Vascular;  Laterality: Left;    ATHRECTOMY ILIAC, FEMORAL, TIBIAL ARTERY Left 11/04/2022    Procedure: AORTO ILIAC FEMORAL LEFT LEG DRUG COATED BALLOON ANGIOPLASTY, LASER ATHERECTOMY, COIL EMBOLIZATION;  Surgeon: Sohan Burger MD;  Location: [facility] OR 18/19;  Service: Vascular;  Laterality: Left;    BRONCHOSCOPY WITH ION ROBOTIC ASSIST N/A 7/13/2023    Procedure: BRONCHOSCOPY WITH ION ROBOT UNDER FLUOROSCOPY, ENDOBRONCHIAL ULTRASOUND, FINE NEEDLE ASPIRATION, BIOPSIES, AND LAVAGE;  Surgeon: Zuhair White MD PhD;  Location[facility] ENDOSCOPY;  Service: Robotics - Pulmonary;  Laterality: N/A;  pre: lung mass  post: lung mass    CATARACT EXTRACTION, BILATERAL      ENDOSCOPY      FEMORAL TIBIAL BYPASS Left 07/29/2022    Procedure: LEFT FEM PERONEAL TIBIAL BYPASS WITH INSITU GREATER SAPHENPOUS VEIN;  Surgeon: Sohan Burger MD;  Location: [facility] OR;  Service: Vascular;  Laterality: Left;    LOBECTOMY Right 7/31/2023    Procedure: BRONCHOSCOPY, THORACOSCOPY WITH RIGHT UPPER LOBECTOMY WITH [employer] ROBOT, MEDIASTINAL LYMPH NODE DISSECTION, INTERCOSTAL NERVE BLOCK;  Surgeon: Zuhair White MD PhD;  Location: [facility] OR;  Service: Robotics - DaVinci;  Laterality: Right;    TEMPORAL ARTERY BIOPSY Right 06/20/2023    Procedure: RIGHT TEMPORAL ARTERY BIOPSY;  Surgeon: Sohan Burger MD;  Location: [facility] OR;  Service: Vascular;  Laterality: Right;    WISDOM TOOTH EXTRACTION        General Information       Row Name 08/03/23 1525          Physical Therapy Time and Intention    Document Type discharge evaluation/summary  -DJ     Mode of Treatment individual therapy;physical therapy  -DJ       Row Name 08/03/23 1525          General Information    Patient Profile Reviewed yes  -DJ     Prior  Level of Function independent:;ADL's;community mobility;driving  -DJ     Existing Precautions/Restrictions fall  -DJ       Row Name 08/03/23 1525          Living Environment    People in Home alone  -DJ       Row Name 08/03/23 1525          Home Main Entrance    Number of Stairs, Main Entrance two  -DJ       Row Name 08/03/23 1525          Stairs Within Home, Primary    Stairs, Within Home, Primary 6+8  -DJ       Row Name 08/03/23 1525          Cognition    Orientation Status (Cognition) oriented x 3  -DJ       Row Name 08/03/23 1525          Safety Issues, Functional Mobility    Impairments Affecting Function (Mobility) balance  -DJ     Comment, Safety Issues/Impairments (Mobility) gt belt, nonskid socks  -DJ               User Key  (r) = Recorded By, (t) = Taken By, (c) = Cosigned By      Initials Name Provider Type    Kendra Estrada, PT Physical Therapist                   Mobility       Row Name 08/03/23 1526          Bed Mobility    Bed Mobility supine-sit;sit-supine  -DJ     Supine-Sit Reagan (Bed Mobility) modified independence;verbal cues  -DJ     Sit-Supine Reagan (Bed Mobility) modified independence;verbal cues  -DJ     Assistive Device (Bed Mobility) head of bed elevated  -DJ       Row Name 08/03/23 1526          Transfers    Comment, (Transfers) sit/stand from EOB  -DJ       Row Name 08/03/23 1526          Bed-Chair Transfer    Bed-Chair Reagan (Transfers) not tested  -DJ       Row Name 08/03/23 1526          Sit-Stand Transfer    Sit-Stand Reagan (Transfers) standby assist;verbal cues  -DJ       Row Name 08/03/23 1526          Gait/Stairs (Locomotion)    Reagan Level (Gait) standby assist;contact guard;verbal cues  -DJ     Assistive Device (Gait) other (see comments)  0 AD  -DJ     Distance in Feet (Gait) 200'  -DJ     Reagan Level (Stairs) stand by assist;contact guard;verbal cues  -DJ     Handrail Location (Stairs) both sides  -DJ     Number of Steps (Stairs) 9   -DJ     Ascending Technique (Stairs) step-over-step  -DJ     Descending Technique (Stairs) step-over-step  -DJ     Comment, (Gait/Stairs) Pt amb 200' with SBA/CGA without AD - quick pace, no LOB. He negotiated 9 stairs with handrail and SBA using reciprocal gt pattern  -DJ               User Key  (r) = Recorded By, (t) = Taken By, (c) = Cosigned By      Initials Name Provider Type    Kendra Estrada, PT Physical Therapist                   Obj/Interventions       Row Name 08/03/23 1528          Range of Motion Comprehensive    General Range of Motion no range of motion deficits identified  -DJ       Row Name 08/03/23 1528          Strength Comprehensive (MMT)    Comment, General Manual Muscle Testing (MMT) Assessment good LE strength  -DJ       Row Name 08/03/23 1528          Motor Skills    Motor Skills functional endurance  -DJ     Functional Endurance good  -DJ       Row Name 08/03/23 1528          Balance    Balance Assessment standing static balance;standing dynamic balance  -DJ     Static Standing Balance standby assist  -DJ     Dynamic Standing Balance standby assist;contact guard  -DJ     Position/Device Used, Standing Balance other (see comments)  0 AD  -DJ     Balance Interventions sitting;standing;sit to stand;supported;weight shifting activity  -DJ     Comment, Balance no LOB  -DJ       Row Name 08/03/23 1528          Sensory Assessment (Somatosensory)    Sensory Assessment (Somatosensory) not tested  -DJ               User Key  (r) = Recorded By, (t) = Taken By, (c) = Cosigned By      Initials Name Provider Type    Kendra Estrada, PT Physical Therapist                   Goals/Plan    No documentation.                  Clinical Impression       Row Name 08/03/23 1529          Pain    Pre/Posttreatment Pain Comment Vauge c/o discomfort at chest tube site  -DJ     Pain Intervention(s) Repositioned;Rest  -DJ       Row Name 08/03/23 1529          Plan of Care Review    Plan of Care Reviewed With patient   -DJ     Outcome Evaluation 83yo white male admitted 7/31/23 with lung mass; now s/p BRONCHOSCOPY, THORACOSCOPY WITH RIGHT UPPER LOBECTOMY, MEDIASTINAL LYMPH NODE DISSECTION, INTERCOSTAL NERVE BLOCK 7/31/23. PMH includes congenital spine anomaly, hbp, L LE ischemia and failed vascular bypass. PLOF: Pt lives at home alone in [facility] with 2 ABBY and a flight each between floors. He was independent with ADLs and used a cane for mobility. Today, he was found resting in bed in [place], pleasant and cooperative and eager to move. He was essentially independent with bed mobility and stood from EOB with SBA. Pt amb 200' with SBA/CGA without AD - quick pace, no LOB. He negotiated 9 stairs with handrail and SBA using reciprocal gt pattern. He retuirned supine in bed with SBA. He is safe to return home. Pt was encouraged to amb with ns staff throughout the day. Will sign off  -DJ       Row Name 08/03/23 1526          Therapy Assessment/Plan (PT)    Patient/Family Therapy Goals Statement (PT) home  -DJ     Criteria for Skilled Interventions Met (PT) no problems identified which require skilled intervention  -DJ     Therapy Frequency (PT) evaluation only  -DJ       Row Name 08/03/23 1529          Vital Signs    O2 Delivery Pre Treatment room air  -DJ     O2 Delivery Intra Treatment room air  -DJ     O2 Delivery Post Treatment room air  -DJ     Pre Patient Position Side Lying  -DJ     Intra Patient Position Standing  -DJ     Post Patient Position Supine  -DJ       Row Name 08/03/23 1529          Positioning and Restraints    Pre-Treatment Position in bed  -DJ     Post Treatment Position bed  -DJ     In Bed notified nsg;supine;call light within reach;encouraged to call for assist;exit alarm on  -DJ               User Key  (r) = Recorded By, (t) = Taken By, (c) = Cosigned By      Initials Name Provider Type    Kendra Estrada, PT Physical Therapist                   Outcome Measures       Row Name 08/03/23 0468          How much help  from another person do you currently need...    Turning from your back to your side while in flat bed without using bedrails? 4  -DJ     Moving from lying on back to sitting on the side of a flat bed without bedrails? 4  -DJ     Moving to and from a bed to a chair (including a wheelchair)? 3  -DJ     Standing up from a chair using your arms (e.g., wheelchair, bedside chair)? 4  -DJ     Climbing 3-5 steps with a railing? 3  -DJ     To walk in hospital room? 3  -DJ     AM-PAC 6 Clicks Score (PT) 21  -DJ     Highest level of mobility 6 --> Walked 10 steps or more  -DJ       Row Name 08/03/23 1538          Functional Assessment    Outcome Measure Options AM-PAC 6 Clicks Basic Mobility (PT)  -DJ               User Key  (r) = Recorded By, (t) = Taken By, (c) = Cosigned By      Initials Name Provider Type    Kendra Estrada, PT Physical Therapist                  Physical Therapy Education       Title: PT OT SLP Therapies (In Progress)       Topic: Physical Therapy (In Progress)       Point: Mobility training (Done)       Learning Progress Summary             Patient Acceptance, E, VU by DJ at 8/3/2023 1538                         Point: Home exercise program (Not Started)       Learner Progress:  Not documented in this visit.              Point: Body mechanics (Done)       Learning Progress Summary             Patient Acceptance, E, VU by  at 8/3/2023 1538                         Point: Precautions (Done)       Learning Progress Summary             Patient Acceptance, E, VU by DJ at 8/3/2023 1538                                         User Key       Initials Effective Dates Name Provider Type Discipline     10/25/19 -  Kendra Pacheco, AUGUSTUS Physical Therapist PT                  PT Recommendation and Plan     Plan of Care Reviewed With: patient  Outcome Evaluation: 81yo white male admitted 7/31/23 with lung mass; now s/p BRONCHOSCOPY, THORACOSCOPY WITH RIGHT UPPER LOBECTOMY, MEDIASTINAL LYMPH NODE DISSECTION, INTERCOSTAL  NERVE BLOCK 7/31/23. PMH includes congenital spine anomaly, hbp, L LE ischemia and failed vascular bypass. PLOF: [facility] and a flight each between floors. He was independent with ADLs and used a cane for mobility. Today, he was found resting in bed in [place], pleasant and cooperative and eager to move. He was essentially independent with bed mobility and stood from EOB with SBA. Pt amb 200' with SBA/CGA without AD - quick pace, no LOB. He negotiated 9 stairs with handrail and SBA using reciprocal gt pattern. He retuirned supine in bed with SBA. He is safe to return home. Pt was encouraged to amb with ns staff throughout the day. Will sign off     Time Calculation:   PT Evaluation Complexity  History, PT Evaluation Complexity: 3 or more personal factors and/or comorbidities  Examination of Body Systems (PT Eval Complexity): total of 4 or more elements  Clinical Presentation (PT Evaluation Complexity): evolving  Clinical Decision Making (PT Evaluation Complexity): moderate complexity  Overall Complexity (PT Evaluation Complexity): moderate complexity     PT Charges       Row Name 08/03/23 1539             Time Calculation    Start Time 1501  -DJ      Stop Time 1525  -DJ      Time Calculation (min) 24 min  -DJ      PT Non-Billable Time (min) 10 min  -DJ      PT Received On 08/03/23  -DJ                User Key  (r) = Recorded By, (t) = Taken By, (c) = Cosigned By      Initials Name Provider Type    Kendra Estrada, PT Physical Therapist                  Therapy Charges for Today       Code Description Service Date Service Provider Modifiers Qty    33803403733 HC PT EVAL MOD COMPLEXITY 2 8/3/2023 Kendra Pacheco, PT GP 1    71484592352 HC PT THERAPEUTIC ACT EA 15 MIN 8/3/2023 Kendra Pacheco, PT GP 1    67367960166 HC GAIT TRAINING EA 15 MIN 8/3/2023 Kendra Pacheco, PT GP 1            PT G-Codes  Outcome Measure Options: AM-PAC 6 Clicks Basic Mobility (PT)  AM-PAC 6 Clicks Score (PT):  21    PT Discharge Summary  Anticipated Discharge Disposition (PT): home with home health (if he wants it)    Kendra Pacheco, PT  8/3/2023

## 2023-08-04 ENCOUNTER — APPOINTMENT (OUTPATIENT)
Dept: GENERAL RADIOLOGY | Facility: HOSPITAL | Age: 83
DRG: 164 | End: 2023-08-04
Payer: MEDICARE

## 2023-08-04 LAB
ANION GAP SERPL CALCULATED.3IONS-SCNC: 12 MMOL/L (ref 5–15)
CHLORIDE SERPL-SCNC: 105 MMOL/L (ref 98–107)
CO2 SERPL-SCNC: 27 MMOL/L (ref 22–29)
POTASSIUM SERPL-SCNC: 3.4 MMOL/L (ref 3.5–5.2)
SODIUM SERPL-SCNC: 144 MMOL/L (ref 136–145)

## 2023-08-04 PROCEDURE — 99024 POSTOP FOLLOW-UP VISIT: CPT

## 2023-08-04 PROCEDURE — 80051 ELECTROLYTE PANEL: CPT | Performed by: STUDENT IN AN ORGANIZED HEALTH CARE EDUCATION/TRAINING PROGRAM

## 2023-08-04 PROCEDURE — 94664 DEMO&/EVAL PT USE INHALER: CPT

## 2023-08-04 PROCEDURE — 94799 UNLISTED PULMONARY SVC/PX: CPT

## 2023-08-04 PROCEDURE — 71045 X-RAY EXAM CHEST 1 VIEW: CPT

## 2023-08-04 PROCEDURE — 93010 ELECTROCARDIOGRAM REPORT: CPT | Performed by: INTERNAL MEDICINE

## 2023-08-04 PROCEDURE — 25010000002 ENOXAPARIN PER 10 MG: Performed by: STUDENT IN AN ORGANIZED HEALTH CARE EDUCATION/TRAINING PROGRAM

## 2023-08-04 PROCEDURE — 93005 ELECTROCARDIOGRAM TRACING: CPT | Performed by: STUDENT IN AN ORGANIZED HEALTH CARE EDUCATION/TRAINING PROGRAM

## 2023-08-04 RX ADMIN — DIAZEPAM 2 MG: 2 TABLET ORAL at 22:01

## 2023-08-04 RX ADMIN — CELECOXIB 100 MG: 100 CAPSULE ORAL at 08:35

## 2023-08-04 RX ADMIN — ACETAMINOPHEN 1000 MG: 500 TABLET, FILM COATED ORAL at 08:35

## 2023-08-04 RX ADMIN — CELECOXIB 100 MG: 100 CAPSULE ORAL at 22:01

## 2023-08-04 RX ADMIN — ATORVASTATIN CALCIUM 40 MG: 20 TABLET, FILM COATED ORAL at 22:01

## 2023-08-04 RX ADMIN — DILTIAZEM HYDROCHLORIDE 30 MG: 30 TABLET, FILM COATED ORAL at 05:43

## 2023-08-04 RX ADMIN — IPRATROPIUM BROMIDE AND ALBUTEROL SULFATE 3 ML: 2.5; .5 SOLUTION RESPIRATORY (INHALATION) at 11:44

## 2023-08-04 RX ADMIN — DILTIAZEM HYDROCHLORIDE 30 MG: 30 TABLET, FILM COATED ORAL at 22:02

## 2023-08-04 RX ADMIN — ENOXAPARIN SODIUM 40 MG: 100 INJECTION SUBCUTANEOUS at 08:35

## 2023-08-04 RX ADMIN — IPRATROPIUM BROMIDE AND ALBUTEROL SULFATE 3 ML: 2.5; .5 SOLUTION RESPIRATORY (INHALATION) at 15:03

## 2023-08-04 RX ADMIN — ACETAMINOPHEN 1000 MG: 500 TABLET, FILM COATED ORAL at 16:51

## 2023-08-04 RX ADMIN — IPRATROPIUM BROMIDE AND ALBUTEROL SULFATE 3 ML: 2.5; .5 SOLUTION RESPIRATORY (INHALATION) at 21:29

## 2023-08-04 RX ADMIN — ACETAMINOPHEN 1000 MG: 500 TABLET, FILM COATED ORAL at 22:01

## 2023-08-04 RX ADMIN — OXYCODONE HYDROCHLORIDE 5 MG: 5 TABLET ORAL at 22:01

## 2023-08-04 RX ADMIN — IPRATROPIUM BROMIDE AND ALBUTEROL SULFATE 3 ML: 2.5; .5 SOLUTION RESPIRATORY (INHALATION) at 07:22

## 2023-08-04 RX ADMIN — VENLAFAXINE HYDROCHLORIDE 150 MG: 150 CAPSULE, EXTENDED RELEASE ORAL at 22:02

## 2023-08-04 RX ADMIN — DILTIAZEM HYDROCHLORIDE 30 MG: 30 TABLET, FILM COATED ORAL at 12:22

## 2023-08-04 RX ADMIN — DILTIAZEM HYDROCHLORIDE 30 MG: 30 TABLET, FILM COATED ORAL at 18:49

## 2023-08-04 RX ADMIN — ASPIRIN 81 MG: 81 TABLET, COATED ORAL at 22:01

## 2023-08-04 RX ADMIN — HYDROCHLOROTHIAZIDE 25 MG: 25 TABLET ORAL at 22:02

## 2023-08-04 NOTE — PROGRESS NOTES
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  He reports chest pain (Incisional, Well controlled).  No shortness of breath, cough, weakness or chest pressure.    Diet:  Adequate intake.  No nausea or vomiting.    Activity level: Returning to normal.    Pain:  He complains of pain that is mild.  Pain is well controlled.      Objective:  General Appearance:  Comfortable, in no acute distress and well-appearing.    Vital signs: (most recent): Blood pressure 148/78, pulse 91, temperature 98 øF (36.7 øC), temperature source Oral, resp. rate 16, height 177.8 cm (70\"), weight 89.9 kg (198 lb 3.1 oz), SpO2 97 %.  Vital signs are normal.  No fever.    Lungs:  Normal effort and normal respiratory rate.  No rales, wheezes or rhonchi.    Heart: Regular rhythm.    Chest: Symmetric chest wall expansion. Chest wall tenderness present.    Abdomen: Abdomen is non-distended.    Pulses: Distal pulses are intact.    Neurological: Patient is alert and oriented to person, place and time.    Skin:  Warm and dry.            Chest tube:   Site: Right, Clean, Dry, Intact, and Securement device intact  Suction: waterseal  Air Leak: positive, improving.   24 Hour Total: 150mL    Results Review:     I reviewed the patient's new clinical results.  I reviewed the patient's new imaging results and agree with the interpretation.  I reviewed the patient's other test results and agree with the interpretation  Discussed with patient, family at bedside, RN, Dr. White.    Assessment & Plan     Mr. De La Torre continues to improve postoperatively.  His chest tube remains to waterseal and a small airleak is present though improved.  I have independently reviewed the chest x-ray " performed this morning which demonstrates no significant change compared to the previous.  He denies any uncontrolled pain or shortness of breath.  Continue multimodal analgesic regimen.  CIWA monitoring ongoing.    Continue chest tube to waterseal.  Repeat a chest x-ray in the morning.  Encourage good pulmonary hygiene including use of I-S. Increase activity including scheduled walks around nurses station at least 3 times daily. PT evaluation and recommendations noted.    MAREN Saxena  [facility]  08/04/23  14:48 EDT    Patient was seen and assessed while wearing personal protective equipment including facemask, protective eyewear and gloves.  Hand hygiene performed prior to entering the room and upon exiting with doffing of gloves.

## 2023-08-04 NOTE — PLAN OF CARE
Goal Outcome Evaluation:  Plan of Care Reviewed With: (P) patient           Outcome Evaluation: (P) VSS. Alert and oriented X 4. up ad kimberlee. Room air.Right chest tube remain on waterseal.Denies shortness of breath or severe pain. Pain managment with PRN pain medications. Tolerated regular diet well. Last bowel movement was yesterday. Voding per urinal. Will continue to monitor.

## 2023-08-05 ENCOUNTER — APPOINTMENT (OUTPATIENT)
Dept: GENERAL RADIOLOGY | Facility: HOSPITAL | Age: 83
DRG: 164 | End: 2023-08-05
Payer: MEDICARE

## 2023-08-05 PROCEDURE — 71045 X-RAY EXAM CHEST 1 VIEW: CPT

## 2023-08-05 PROCEDURE — 25010000002 ENOXAPARIN PER 10 MG: Performed by: STUDENT IN AN ORGANIZED HEALTH CARE EDUCATION/TRAINING PROGRAM

## 2023-08-05 PROCEDURE — 94799 UNLISTED PULMONARY SVC/PX: CPT

## 2023-08-05 PROCEDURE — 94664 DEMO&/EVAL PT USE INHALER: CPT

## 2023-08-05 PROCEDURE — 94761 N-INVAS EAR/PLS OXIMETRY MLT: CPT

## 2023-08-05 RX ORDER — POTASSIUM CHLORIDE 750 MG/1
40 TABLET, FILM COATED, EXTENDED RELEASE ORAL ONCE
Status: COMPLETED | OUTPATIENT
Start: 2023-08-05 | End: 2023-08-05

## 2023-08-05 RX ADMIN — CELECOXIB 100 MG: 100 CAPSULE ORAL at 09:22

## 2023-08-05 RX ADMIN — ASPIRIN 81 MG: 81 TABLET, COATED ORAL at 21:33

## 2023-08-05 RX ADMIN — ENOXAPARIN SODIUM 40 MG: 100 INJECTION SUBCUTANEOUS at 09:22

## 2023-08-05 RX ADMIN — IPRATROPIUM BROMIDE AND ALBUTEROL SULFATE 3 ML: 2.5; .5 SOLUTION RESPIRATORY (INHALATION) at 21:20

## 2023-08-05 RX ADMIN — ACETAMINOPHEN 1000 MG: 500 TABLET, FILM COATED ORAL at 21:34

## 2023-08-05 RX ADMIN — DILTIAZEM HYDROCHLORIDE 30 MG: 30 TABLET, FILM COATED ORAL at 17:48

## 2023-08-05 RX ADMIN — DILTIAZEM HYDROCHLORIDE 30 MG: 30 TABLET, FILM COATED ORAL at 12:26

## 2023-08-05 RX ADMIN — DIAZEPAM 2 MG: 2 TABLET ORAL at 21:39

## 2023-08-05 RX ADMIN — GABAPENTIN 300 MG: 300 CAPSULE ORAL at 21:35

## 2023-08-05 RX ADMIN — ACETAMINOPHEN 1000 MG: 500 TABLET, FILM COATED ORAL at 16:48

## 2023-08-05 RX ADMIN — ACETAMINOPHEN 1000 MG: 500 TABLET, FILM COATED ORAL at 09:21

## 2023-08-05 RX ADMIN — DILTIAZEM HYDROCHLORIDE 30 MG: 30 TABLET, FILM COATED ORAL at 05:36

## 2023-08-05 RX ADMIN — CELECOXIB 100 MG: 100 CAPSULE ORAL at 21:33

## 2023-08-05 RX ADMIN — IPRATROPIUM BROMIDE AND ALBUTEROL SULFATE 3 ML: 2.5; .5 SOLUTION RESPIRATORY (INHALATION) at 07:59

## 2023-08-05 RX ADMIN — IPRATROPIUM BROMIDE AND ALBUTEROL SULFATE 3 ML: 2.5; .5 SOLUTION RESPIRATORY (INHALATION) at 11:30

## 2023-08-05 RX ADMIN — POTASSIUM CHLORIDE 40 MEQ: 750 TABLET, EXTENDED RELEASE ORAL at 12:25

## 2023-08-05 RX ADMIN — VENLAFAXINE HYDROCHLORIDE 150 MG: 150 CAPSULE, EXTENDED RELEASE ORAL at 21:35

## 2023-08-05 RX ADMIN — OXYCODONE HYDROCHLORIDE 5 MG: 5 TABLET ORAL at 21:39

## 2023-08-05 RX ADMIN — HYDROCHLOROTHIAZIDE 25 MG: 25 TABLET ORAL at 21:35

## 2023-08-05 RX ADMIN — IPRATROPIUM BROMIDE AND ALBUTEROL SULFATE 3 ML: 2.5; .5 SOLUTION RESPIRATORY (INHALATION) at 15:31

## 2023-08-05 RX ADMIN — ATORVASTATIN CALCIUM 40 MG: 20 TABLET, FILM COATED ORAL at 21:34

## 2023-08-05 NOTE — PLAN OF CARE
Goal Outcome Evaluation:  Plan of Care Reviewed With: (P) patient        Progress: (P) improving  Outcome Evaluation: (P) VSS. Alert and oriented X 4. up ad kimberlee. Room air.Right chest tube remain on waterseal.Denies shortness of breath or severe pain. Pain managment with PRN pain medications. Tolerated regular diet well. One bowel movement today. Voding per urinal. Will continue to monitor.

## 2023-08-05 NOTE — PROGRESS NOTES
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  No shortness of breath, cough, chest pain (Incisional, Well controlled), weakness or chest pressure.    Diet:  Adequate intake.  No nausea or vomiting.    Activity level: Returning to normal.    Pain:  He reports no pain.      Objective:  General Appearance:  Comfortable, in no acute distress, well-appearing and not in pain.    Vital signs: (most recent): Blood pressure 163/79, pulse 83, temperature 98.3 øF (36.8 øC), temperature source Oral, resp. rate 18, height 177.8 cm (70\"), weight 89.9 kg (198 lb 3.1 oz), SpO2 99 %.  Vital signs are normal.  No fever.    Lungs:  Normal effort and normal respiratory rate.  No rales, wheezes or rhonchi.    Heart: Regular rhythm.    Chest: Symmetric chest wall expansion.   Abdomen: Abdomen is non-distended.    Pulses: Distal pulses are intact.    Neurological: Patient is alert and oriented to person, place and time.    Skin:  Warm and dry.            Chest tube:   Site: Right, Clean, Dry, Intact, and Securement device intact  Suction: waterseal  Air Leak: positive, improving.   24 Hour Total: 100mL    Results Review:     I reviewed the patient's new clinical results.  I reviewed the patient's new imaging results and agree with the interpretation.  I reviewed the patient's other test results and agree with the interpretation  Discussed with patient and RN    Assessment & Plan     Mr. De La Torre is an 82-year-old gentleman who is postoperative day 5 after undergoing a robotic assisted upper lobectomy.  Continues to have an intermittent airleak with respiratory variation.  This is slightly improved today compared to yesterday.  Due to the fact that he continues to " have an air leak, we will continue to keep his chest tube in and to waterseal.  His chest x-ray appears similar compared to yesterday and he denies any shortness of breath.  Mr. High and I had a long discussion today if he continues to have a small air leak, I would anticipate changing him to a mini 500 on Monday and sending him home with short interval follow-up in clinic early this week.  Continue aggressive pulmonary toilet and regular diet.  I will replace his potassium from his electrolyte panel seen yesterday evening.    Zuhair White MD PhD  [facility]  08/05/23  11:24 EDT    Patient was seen and assessed while wearing personal protective equipment including facemask, protective eyewear and gloves.  Hand hygiene performed prior to entering the room and upon exiting with doffing of gloves.

## 2023-08-05 NOTE — PLAN OF CARE
Goal Outcome Evaluation:           Progress: improving  Outcome Evaluation: vss, pain controlled by prn pain meds. CT on waterseal, w/ intermittent air leak. pt has well during shift. labs in am. will continue to monitor.    CIWA has been zero all shift.

## 2023-08-06 ENCOUNTER — APPOINTMENT (OUTPATIENT)
Dept: GENERAL RADIOLOGY | Facility: HOSPITAL | Age: 83
DRG: 164 | End: 2023-08-06
Payer: MEDICARE

## 2023-08-06 PROCEDURE — 94664 DEMO&/EVAL PT USE INHALER: CPT

## 2023-08-06 PROCEDURE — 94761 N-INVAS EAR/PLS OXIMETRY MLT: CPT

## 2023-08-06 PROCEDURE — 94760 N-INVAS EAR/PLS OXIMETRY 1: CPT

## 2023-08-06 PROCEDURE — 71045 X-RAY EXAM CHEST 1 VIEW: CPT

## 2023-08-06 PROCEDURE — 94799 UNLISTED PULMONARY SVC/PX: CPT

## 2023-08-06 PROCEDURE — 25010000002 ENOXAPARIN PER 10 MG: Performed by: STUDENT IN AN ORGANIZED HEALTH CARE EDUCATION/TRAINING PROGRAM

## 2023-08-06 RX ADMIN — GABAPENTIN 300 MG: 300 CAPSULE ORAL at 15:13

## 2023-08-06 RX ADMIN — HYDROCHLOROTHIAZIDE 25 MG: 25 TABLET ORAL at 20:59

## 2023-08-06 RX ADMIN — ASPIRIN 81 MG: 81 TABLET, COATED ORAL at 20:58

## 2023-08-06 RX ADMIN — CELECOXIB 100 MG: 100 CAPSULE ORAL at 10:06

## 2023-08-06 RX ADMIN — ATORVASTATIN CALCIUM 40 MG: 20 TABLET, FILM COATED ORAL at 20:58

## 2023-08-06 RX ADMIN — ACETAMINOPHEN 1000 MG: 500 TABLET, FILM COATED ORAL at 20:58

## 2023-08-06 RX ADMIN — DILTIAZEM HYDROCHLORIDE 30 MG: 30 TABLET, FILM COATED ORAL at 10:06

## 2023-08-06 RX ADMIN — OXYCODONE HYDROCHLORIDE 5 MG: 5 TABLET ORAL at 20:59

## 2023-08-06 RX ADMIN — IPRATROPIUM BROMIDE AND ALBUTEROL SULFATE 3 ML: 2.5; .5 SOLUTION RESPIRATORY (INHALATION) at 20:43

## 2023-08-06 RX ADMIN — ENOXAPARIN SODIUM 40 MG: 100 INJECTION SUBCUTANEOUS at 10:06

## 2023-08-06 RX ADMIN — ACETAMINOPHEN 1000 MG: 500 TABLET, FILM COATED ORAL at 15:13

## 2023-08-06 RX ADMIN — DILTIAZEM HYDROCHLORIDE 30 MG: 30 TABLET, FILM COATED ORAL at 02:06

## 2023-08-06 RX ADMIN — DIAZEPAM 2 MG: 2 TABLET ORAL at 21:21

## 2023-08-06 RX ADMIN — CELECOXIB 100 MG: 100 CAPSULE ORAL at 20:59

## 2023-08-06 RX ADMIN — IPRATROPIUM BROMIDE AND ALBUTEROL SULFATE 3 ML: 2.5; .5 SOLUTION RESPIRATORY (INHALATION) at 07:51

## 2023-08-06 RX ADMIN — IPRATROPIUM BROMIDE AND ALBUTEROL SULFATE 3 ML: 2.5; .5 SOLUTION RESPIRATORY (INHALATION) at 11:20

## 2023-08-06 RX ADMIN — IPRATROPIUM BROMIDE AND ALBUTEROL SULFATE 3 ML: 2.5; .5 SOLUTION RESPIRATORY (INHALATION) at 15:34

## 2023-08-06 RX ADMIN — VENLAFAXINE HYDROCHLORIDE 150 MG: 150 CAPSULE, EXTENDED RELEASE ORAL at 20:58

## 2023-08-06 RX ADMIN — ACETAMINOPHEN 1000 MG: 500 TABLET, FILM COATED ORAL at 10:06

## 2023-08-06 RX ADMIN — DILTIAZEM HYDROCHLORIDE 30 MG: 30 TABLET, FILM COATED ORAL at 18:50

## 2023-08-06 NOTE — PROGRESS NOTES
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  No shortness of breath, cough, chest pain (Incisional, Well controlled), weakness or chest pressure.    Diet:  Adequate intake.  No nausea or vomiting.    Activity level: Returning to normal.    Pain:  He reports no pain.    Mentally appears better today.  He was slightly down yesterday due to still being in the hospital.  Overall he was in good spirits today.  He denies any shortness of breath.  He does have a small intermittent air leak, this is improved from yesterday.  Objective:  General Appearance:  Comfortable, in no acute distress, well-appearing and not in pain.    Vital signs: (most recent): Blood pressure 172/70, pulse 96, temperature 98.3 øF (36.8 øC), temperature source Oral, resp. rate 18, height 177.8 cm (70\"), weight 89.9 kg (198 lb 3.1 oz), SpO2 96 %.  Vital signs are normal.  No fever.    Lungs:  Normal effort and normal respiratory rate.  No rales, wheezes or rhonchi.    Heart: Regular rhythm.    Chest: Symmetric chest wall expansion.   Abdomen: Abdomen is non-distended.    Pulses: Distal pulses are intact.    Neurological: Patient is alert and oriented to person, place and time.    Skin:  Warm and dry.            Chest tube:   Site: Right, Clean, Dry, Intact, and Securement device intact  Suction: waterseal  Air Leak: positive, improving.   24 Hour Total: 90mL    Results Review:     I reviewed the patient's new clinical results.  I reviewed the patient's new imaging results and agree with the interpretation.  I reviewed the patient's other test results and agree with the interpretation  Discussed with patient and RN    Assessment & Plan     Mr. De La Torre is an 82-year-old " gentleman who is postoperative day 6 after undergoing a robotic assisted upper lobectomy.  Continues to have an intermittent airleak with respiratory variation.    His air leak continues to prove.  Continue aggressive pulmonary toilet at this time.  We will keep his chest tube due to air leak.  If he has a small air leak tomorrow, I would anticipate changing him over to a mini 500 with possible DC home.  If he does not have an air leak, we will clamp his tube tomorrow with possible anticipation with removal.        Zuhair White MD PhD  [facility]  08/06/23  11:08 EDT    Patient was seen and assessed while wearing personal protective equipment including facemask, protective eyewear and gloves.  Hand hygiene performed prior to entering the room and upon exiting with doffing of gloves.

## 2023-08-06 NOTE — PLAN OF CARE
Goal Outcome Evaluation:  Plan of Care Reviewed With: patient        Progress: improving  Outcome Evaluation: VSS, room air, SR/2nd degree block on monitor, Right chest tube to waterseal, intermittent air leak,subQ air in RU Chest. Pain controlled with PRN medication and ERAS. Discharge TBD.

## 2023-08-07 ENCOUNTER — APPOINTMENT (OUTPATIENT)
Dept: GENERAL RADIOLOGY | Facility: HOSPITAL | Age: 83
DRG: 164 | End: 2023-08-07
Payer: MEDICARE

## 2023-08-07 ENCOUNTER — READMISSION MANAGEMENT (OUTPATIENT)
Dept: CALL CENTER | Facility: HOSPITAL | Age: 83
End: 2023-08-07
Payer: MEDICARE

## 2023-08-07 VITALS
HEART RATE: 97 BPM | TEMPERATURE: 98.3 F | DIASTOLIC BLOOD PRESSURE: 58 MMHG | HEIGHT: 70 IN | WEIGHT: 198.19 LBS | OXYGEN SATURATION: 95 % | RESPIRATION RATE: 16 BRPM | SYSTOLIC BLOOD PRESSURE: 139 MMHG | BODY MASS INDEX: 28.37 KG/M2

## 2023-08-07 LAB — QT INTERVAL: 409 MS

## 2023-08-07 PROCEDURE — 94761 N-INVAS EAR/PLS OXIMETRY MLT: CPT

## 2023-08-07 PROCEDURE — 71045 X-RAY EXAM CHEST 1 VIEW: CPT

## 2023-08-07 PROCEDURE — 94664 DEMO&/EVAL PT USE INHALER: CPT

## 2023-08-07 PROCEDURE — 99024 POSTOP FOLLOW-UP VISIT: CPT | Performed by: NURSE PRACTITIONER

## 2023-08-07 PROCEDURE — 25010000002 ENOXAPARIN PER 10 MG: Performed by: STUDENT IN AN ORGANIZED HEALTH CARE EDUCATION/TRAINING PROGRAM

## 2023-08-07 PROCEDURE — 94799 UNLISTED PULMONARY SVC/PX: CPT

## 2023-08-07 PROCEDURE — 94760 N-INVAS EAR/PLS OXIMETRY 1: CPT

## 2023-08-07 RX ORDER — OXYCODONE HYDROCHLORIDE 5 MG/1
5 TABLET ORAL EVERY 4 HOURS PRN
Qty: 18 TABLET | Refills: 0 | Status: SHIPPED | OUTPATIENT
Start: 2023-08-07 | End: 2023-08-10

## 2023-08-07 RX ORDER — GABAPENTIN 300 MG/1
300 CAPSULE ORAL 2 TIMES DAILY
Qty: 60 CAPSULE | Refills: 0 | Status: SHIPPED | OUTPATIENT
Start: 2023-08-07 | End: 2023-08-11

## 2023-08-07 RX ORDER — ACETAMINOPHEN 500 MG
1000 TABLET ORAL 3 TIMES DAILY
Qty: 50 TABLET | Refills: 0 | Status: SHIPPED | OUTPATIENT
Start: 2023-08-07 | End: 2023-08-16

## 2023-08-07 RX ADMIN — IPRATROPIUM BROMIDE AND ALBUTEROL SULFATE 3 ML: 2.5; .5 SOLUTION RESPIRATORY (INHALATION) at 07:40

## 2023-08-07 RX ADMIN — ENOXAPARIN SODIUM 40 MG: 100 INJECTION SUBCUTANEOUS at 08:18

## 2023-08-07 RX ADMIN — DILTIAZEM HYDROCHLORIDE 30 MG: 30 TABLET, FILM COATED ORAL at 08:18

## 2023-08-07 RX ADMIN — IPRATROPIUM BROMIDE AND ALBUTEROL SULFATE 3 ML: 2.5; .5 SOLUTION RESPIRATORY (INHALATION) at 11:17

## 2023-08-07 RX ADMIN — ACETAMINOPHEN 1000 MG: 500 TABLET, FILM COATED ORAL at 08:18

## 2023-08-07 RX ADMIN — IPRATROPIUM BROMIDE AND ALBUTEROL SULFATE 3 ML: 2.5; .5 SOLUTION RESPIRATORY (INHALATION) at 14:19

## 2023-08-07 RX ADMIN — CELECOXIB 100 MG: 100 CAPSULE ORAL at 08:18

## 2023-08-07 RX ADMIN — DILTIAZEM HYDROCHLORIDE 30 MG: 30 TABLET, FILM COATED ORAL at 11:45

## 2023-08-07 NOTE — DISCHARGE PLACEMENT REQUEST
"Manoj De La Torre (82 y.o. Male)       Date of Birth   1940    Social Security Number       Address   [address] [place] [facility]    Home Phone   [phone]    MRN   [id number]       Adventist   [employer]    Marital Status                               Admission Date   7/31/23    Admission Type   Elective    Admitting Provider   Zuhair White MD PhD    Attending Provider   Zuhair White MD PhD    Department, Room/Bed   [address], E565/1       Discharge Date       Discharge Disposition   Home or Self Care    Discharge Destination                                 Attending Provider: Zuhair White MD PhD    Allergies: No Known Allergies    Isolation: None   Infection: None   Code Status: CPR    Ht: 177.8 cm (70\")   Wt: 89.9 kg (198 lb 3.1 oz)    Admission Cmt: None   Principal Problem: Lung mass [R91.8]                   Active Insurance as of 7/31/2023       Primary Coverage       Payor Plan Insurance Group Employer/Plan Group    ANTHEM MEDICARE REPLACEMENT ANTHEM MEDICARE ADVANTAGE [id number]       Payor Plan Address Payor Plan Phone Number Payor Plan Fax Number Effective Dates    [address] [phone]  1/1/2019 - None Entered    [facility] [address]         Subscriber Name Subscriber Birth Date Member ID       MANOJ DE LA TORRE 1940 [id number]                     Emergency Contacts        (Rel.) Home Phone Work Phone Mobile Phone    Ny Shepard (Daughter) [phone] -- [phone]    Manoj De La Torre JR (Son) [phone] -- [phone]    abel kim (Friend) -- -- [phone]              Emergency Contact Information       Name Relation Home Work Mobile    Ny Shepard Daughter [phone]    Manoj De La Torre JR Son [phone]    abel kim Friend   [phone]          "

## 2023-08-07 NOTE — DISCHARGE SUMMARY
Patient Care Team:  Tee Nicole MD as PCP - General (Internal Medicine)  Gabriel Joel MD as Consulting Physician (Cardiology)  Alisha Segura, RN as Nurse Navigator    Date of Admission: 7/31/2023   Date of Discharge:  8/7/2023    Discharge Diagnosis: Lung mass, non-small cell lung cancer, T1c N0 M0 adenocarcinoma of the lung    Presenting Problem  Lung mass [R91.8]  Non-small cell lung cancer [C34.90]     History of Present Illness  Sohan De La Torre is a 82 y.o. male who presented to our office to be seen in consultation by Dr. Zuhair White concerning a right upper lobe mass.  The patient underwent an Ion bronchoscopy with biopsy and unfortunately the pathology was consistent with primary pulmonary adenocarcinoma.  At the same time, he underwent mediastinal staging and his mediastinal lymph node that was noted to be negative for malignancy.  The patient has underwent extensive workup due to the size of the right upper lobe lesion which included an MRI of the brain which was negative for signs of metastatic disease.  His PET scan also did not show any distant areas of metastatic disease.  Pulmonary function tests were adequate for resection.  Christopherexplained his recommendation of a robot-assisted right upper lobectomy with mediastinal lymph node dissection.  After the procedure was explained in detail, answering the patient's questions to his satisfaction, he requested that Dr. White proceed with surgery.    Hospital Course  Sohan De La Torre was admitted to [facility] on 7/31/2023 for a scheduled robot-assisted right upper lobectomy with mediastinal lymph node dissection and intercostal nerve block by Dr. Zuhair White.  The patient tolerated the procedure well, was extubated in the operating room and after patient recovered in the postanesthesia care unit was transferred to [facility] for convalescence.  History was follows had a very satisfactory postoperative course.  He is  hemodynamically stable, his pain is well-managed and he is ambulatory without assistance.  Unfortunately, he does have a persistent air leak from his chest tube.  However, we have placed a chest tube to a mini 500 Pleur-evac to allow the patient to discharge home more easily.  Follow-up chest film is stable.  We have consulted home health to assist the patient with managing his chest tube as an outpatient.  Plans have been made for the patient to follow-up to see Dr. White on Friday, 8/11/2023 at our [place] office.  Prescription medication has been sent to the hospital pharmacy.  Postoperative instructions have been provided to the patient and explained in detail.  He verbalizes understanding and agreement with our plan.    Procedures Performed  Procedure(s):  BRONCHOSCOPY, THORACOSCOPY WITH RIGHT UPPER LOBECTOMY WITH DAVINCI ROBOT, MEDIASTINAL LYMPH NODE DISSECTION, INTERCOSTAL NERVE BLOCK       Consults:   Consults       No orders found from 7/2/2023 to 8/1/2023.            Pertinent Test Results:     Imaging Results (Last 24 Hours)       Procedure Component Value Units Date/Time    XR Chest 1 View [[id number]] Collected: 08/07/23 1538     Updated: 08/07/23 1538    Narrative:      STAT PORTABLE RADIOGRAPHIC VIEW OF THE CHEST     CLINICAL HISTORY: Status post chest tube.      FINDINGS:     There is a right-sided chest tube in place. The chest tube has a similar  trajectory as compared to the prior study from 5:41 AM. The chest tube  loops towards the right lung apex and subsequently descends into the  region of the base of the right hemithorax. The previously identified  small right apical pneumothorax is not definitively seen on this plain  radiograph. The left lung is clear. There is a prominent amount of  subcutaneous emphysema within the right lateral chest wall. The  cardiomediastinal silhouette is unremarkable.       XR Chest 1 View [[id number]] Collected: 08/07/23 0637     Updated: 08/07/23 0642     Narrative:      XR CHEST 1 VW-clinical: Chest tube management     COMPARISON examination 08/06/2023     FINDINGS: Right-sided chest tube in position as before. Subcutaneous  emphysema similar to the previous examination. Right apical pneumothorax  has diminished in size, distance between the pleural reflection and the  apex now 2.5 cm compared to 3.2 cm previously.     The cardiomediastinal silhouette is stable. No pulmonary edema or acute  airspace disease has developed.     CONCLUSION: Right apical pneumothorax diminished in size since  08/06/2023.     This report was finalized on 8/7/2023 6:39 AM by Dr. Reyes Chase M.D.               Lab Results (last 24 hours)       ** No results found for the last 24 hours. **              Condition on Discharge:  good    Vital Signs  Temp:  [97.9 øF (36.6 øC)-98.5 øF (36.9 øC)] 98.3 øF (36.8 øC)  Heart Rate:  [] 97  Resp:  [16-20] 16  BP: (135-169)/(58-89) 139/58    Physical Exam:    General Appearance:    Alert, cooperative, in no acute distress   Head:    Normocephalic, without obvious abnormality, atraumatic   Eyes:            Lids and lashes normal, conjunctivae and sclerae normal, no   icterus, no pallor, corneas clear, PERRLA   Ears:    Ears appear intact with no abnormalities noted   Throat:   No oral lesions, no thrush, oral mucosa moist   Neck:   No adenopathy, supple, trachea midline, no thyromegaly, no   carotid bruit, no JVD   Back:     No kyphosis present, no scoliosis present, no skin lesions,      erythema or scars, no tenderness to percussion or                   palpation,   range of motion normal   Lungs:     Clear to auscultation,respirations regular, even and                  unlabored    Heart:    Regular rhythm and normal rate, normal S1 and S2, no            murmur, no gallop, no rub, no click   Chest Wall:  Surgical incisions are clean, dry and intact with Dermabond.  Chest tube site clean and dry.  There is an air leak from the patient's chest  tube noted in his [id number].   Abdomen:     Normal bowel sounds, no masses, no organomegaly, soft        non-tender, non-distended, no guarding, no rebound                tenderness   Rectal:     Deferred   Extremities:   Moves all extremities well, no edema, no cyanosis, no             redness   Pulses:   Pulses palpable and equal bilaterally   Skin:   No bleeding, bruising or rash   Lymph nodes:   No palpable adenopathy   Neurologic:   Cranial nerves 2 - 12 grossly intact, sensation intact, DTR       present and equal bilaterally       Discharge Disposition  Home today with Home Health    Discharge Medications     Discharge Medications        New Medications        Instructions Start Date   Acetaminophen Extra Strength 500 MG tablet  Generic drug: acetaminophen   1,000 mg, Oral, 3 Times Daily      dilTIAZem 30 MG tablet  Commonly known as: CARDIZEM   30 mg, Oral, Every 6 Hours Scheduled      gabapentin 300 MG capsule  Commonly known as: NEURONTIN   300 mg, Oral, 2 Times Daily      oxyCODONE 5 MG immediate release tablet  Commonly known as: ROXICODONE   5 mg, Oral, Every 4 Hours PRN             Changes to Medications        Instructions Start Date   aspirin 81 MG EC tablet  What changed: additional instructions   81 mg, Oral, Nightly, INSTRUCTED TO CONT UNTIL DOS             Continue These Medications        Instructions Start Date   ALPRAZolam 0.25 MG tablet  Commonly known as: XANAX   0.25 mg, Oral, As Needed      atorvastatin 40 MG tablet  Commonly known as: LIPITOR   40 mg, Oral, Nightly      Chlorhexidine Gluconate Cloth 2 % pads   1 application , Apply externally, Take As Directed      hydroCHLOROthiazide 25 MG tablet  Commonly known as: HYDRODIURIL   1 tablet, Oral, Nightly      ibuprofen 600 MG tablet  Commonly known as: ADVIL,MOTRIN   600 mg, Oral, Nightly, HOLD FOR SURGERY      venlafaxine XR 75 MG 24 hr capsule  Commonly known as: EFFEXOR-XR   150 mg, Oral, Nightly, TAKES TWO PILLS EVERY NIGHT              Stop These Medications      clopidogrel 75 MG tablet  Commonly known as: Plavix              Discharge Instructions:  No heavy lifting, pushing, pulling greater than 10 pounds.  No driving up until 2 weeks after surgery and no longer taking narcotics.  Resume home diet as tolerated.  Continue incentive spirometer at least 4 times per day.  Remove dressing from post chest tube site after 48 hours, may shower and clean surgical sites with antibacterial soap or hydrogen peroxide, and apply gauze dressing or band-aid as needed for any drainage.  No dressing needed once no longer draining.          Follow-up Appointments  Future Appointments   Date Time Provider Department Center   8/11/2023 10:45 AM Matthew Hoffman MD PhD [facility]     Additional Instructions for the Follow-ups that You Need to Schedule       Ambulatory Referral to Home Health (Hospital)   As directed      Please change chest tube dressing with DRY 4x4 and minimal silk tape    Order Comments: Please change chest tube dressing with DRY 4x4 and minimal silk tape    Face to Face Visit Date: 8/7/2023   Follow-up provider for Plan of Care?: I treated the patient in an acute care facility and will not continue treatment after discharge.   Follow-up provider: MATTHEW HOFFMAN [[id number]]   Reason/Clinical Findings: chest tube   Describe mobility limitations that make leaving home difficult: chest tube mgt   Nursing/Therapeutic Services Requested: Skilled Nursing   Skilled nursing orders: Other   Frequency: 1 Week 1        XR Chest 2 View    Aug 09, 2023 (Approximate)      Exam reason: post-op   Release to patient: Routine Release                Test Results Pending at Discharge      For any questions regarding patient's stay, please refer to patient's chart.    MAREN Eisenberg  [facility]  08/07/23  15:42 EDT    Greater than 30 minutes was spent on the unit discharging this patient, with more than 50% of time spent assessing  the patient, counseling the patient on postoperative care and discharge planning.

## 2023-08-07 NOTE — DISCHARGE INSTRUCTIONS
Discharge instructions for [facility] Mini 500    Keep unit upright and below the level of your chest  Do not disconnect unit  Do not kink tubing by sitting or laying on it  Use belt straps provided to be able to move  and walk around with unit attached to you  No tub baths until chest tube is removed. Sponge baths are okay or you may shower on the days that your visiting nurse visits. Shower just before the nurse visit so the nurse can change the dressing.  Per Doctor Directions Only:  If you are to empty the unit, clean access port on lower front of unit with alcohol wipe. Attach syringe and withdraw fluid. Write down how much fluid you withdraw and bring this with you the next time you see your doctor. How often you do this and when you do it depends on what your doctor tells you to do.  Chest tube usually stays in 1-2 weeks until leak is healed  Return to office to have tube removed  Dressing is then removed 48 hours after chest tube is removed.  Call your doctor if:  The tube falls out: cover the site with a dry, sterile dressing right away  You have shortness of breath or chest pain  You have a fever over 101 degrees  You have any foul smelling or cloudy drainage        Post-op VAT / Thoracotomy Discharge Instructions    1. Activity:  ú Climb stairs as tolerated  ú May drive car after 2 weeks or as directed by surgeon  ú Walk at least 3-4 times a day  ú Limit lifting for first 6 weeks. No lifting, pushing, or pulling greater than 20 pounds for at least 2 weeks  ú Continue to use your incentive spirometer 10x/hour    2. Nutrition:  ú Resume previous diet  ú Eat well balanced meals  ú Avoid constipation by eating fresh fruits, vegetables, whole grain foods and increasing fluid intake.    3. Incision (wound) Care:  ú Remove dressing after 48 hours, then leave open to air  ú If continued drainage, change dressing every 24 hours and as needed with dry gauze  ú May shower after discharge.  ú Wash around incision area  with soap and water daily. May also cleanse with hydrogen peroxide and/or betadine  ú No lotions or creams on incision area. It is normal to have some drainage from your chest tube site. Please keep the area clean and dry.    4. When to call for Medical Advice: [phone]  ú Fever greater than 101 degrees  ú Unusual or severe pain  ú Difficulty breathing  ú Incision changes (redness, swelling, or increased purulent drainage)  ú Any questions or problems    5. Medication Instructions:  ú Take Pain medications as directed to stay comfortable     -Tylenol: 1000mg 3x/day, Gabapentin 3x/day (for nerve pain), Celebrex (anti-inflamatory) 2x/day, oxycodone as needed- FOR 30 DAYS  ú No driving or drinking alcohol when taking prescribed pain meds  ú Laxative of choice if needed for constipation.    6. Follow- up appointment:  ú We will arrange a follow up appointment in about 2 weeks   Please go to the main hospital for a chest x-ray prior to your appointment

## 2023-08-07 NOTE — PLAN OF CARE
Goal Outcome Evaluation:  Plan of Care Reviewed With: patient        Progress: improving  Outcome Evaluation: VSS except hypertensive at beginning of shift. SR/2nd degree block on monitor. Right chest tube remains to waterseal - dressing changed, air leak remains. Pain controlled with ERAS & PRN Medication. Possible discharge today.

## 2023-08-07 NOTE — CASE MANAGEMENT/SOCIAL WORK
Continued Stay Note  [facility]     Patient Name: Sohan De La Torre  MRN: [id number]  Today's Date: 8/7/2023    Admit Date: 7/31/2023    Plan: Home   Discharge Plan       Row Name 08/07/23 1410       Plan    Plan Home    Patient/Family in Agreement with Plan yes    Plan Comments Met with pt at bedside who confirms plan to return home at discharge.  Discussed possible need for HH if goes home with [id number].  Pt declines at this time. CCP continues to follow. colton Dempsey RN                   Discharge Codes    No documentation.                       Mary Dempsey, RN

## 2023-08-08 ENCOUNTER — HOME CARE VISIT (OUTPATIENT)
Dept: HOME HEALTH SERVICES | Facility: HOME HEALTHCARE | Age: 83
End: 2023-08-08
Payer: COMMERCIAL

## 2023-08-08 ENCOUNTER — HOME CARE VISIT (OUTPATIENT)
Dept: HOME HEALTH SERVICES | Facility: HOME HEALTHCARE | Age: 83
End: 2023-08-08
Payer: MEDICARE

## 2023-08-08 ENCOUNTER — HOME HEALTH ADMISSION (OUTPATIENT)
Dept: HOME HEALTH SERVICES | Facility: HOME HEALTHCARE | Age: 83
End: 2023-08-08
Payer: COMMERCIAL

## 2023-08-08 VITALS
OXYGEN SATURATION: 99 % | DIASTOLIC BLOOD PRESSURE: 70 MMHG | RESPIRATION RATE: 17 BRPM | TEMPERATURE: 97.8 F | HEART RATE: 97 BPM | SYSTOLIC BLOOD PRESSURE: 154 MMHG

## 2023-08-08 PROCEDURE — G0299 HHS/HOSPICE OF RN EA 15 MIN: HCPCS

## 2023-08-08 NOTE — CASE MANAGEMENT/SOCIAL WORK
Case Management Discharge Note      Final Note: Discharged home with [facility]         Selected Continued Care - Discharged on 8/7/2023 Admission date: 7/31/2023 - Discharge disposition: Home or Self Care      Destination    No services have been selected for the patient.                Durable Medical Equipment    No services have been selected for the patient.                Dialysis/Infusion    No services have been selected for the patient.                Home Medical Care       Service Provider Selected Services Address Phone Fax Patient Preferred    [facility] [address] [facility] [address] [phone] --              Therapy    No services have been selected for the patient.                Community Resources    No services have been selected for the patient.                Community & DME    No services have been selected for the patient.                    Transportation Services  Private: Car    Final Discharge Disposition Code: 06 - home with home health care

## 2023-08-08 NOTE — Clinical Note
Informational Purposes Only:  As per home health protocol MD must be notified of any MAJOR drug interactions found upon medication reivew. Please advise if any changes should be made to pts medications.    Drug-Drug: dilTIAZem and atorvastatin   Plasma concentrations and pharmacologic effects of atorvastatin may be increased by coadministration of diltiazem. The risk of myopathy and rhabdomyolysis may be increased.    Thank you,   [facility]   phone [phone]  fax [phone]

## 2023-08-08 NOTE — OUTREACH NOTE
Prep Survey      Flowsheet Row Responses   [facility] patient discharged from? [facility]   Is LACE score < 7 ? No   Eligibility Readm Mgmt   Discharge diagnosis Bronchoscopy thoracoscopy upper lobectomy   Does the patient have one of the following disease processes/diagnoses(primary or secondary)? Cardiothoracic surgery   Does the patient have Home health ordered? Yes   What is the Home health agency?  [facility]   Is there a DME ordered? No   Prep survey completed? Yes            GIDEON A - Registered Nurse

## 2023-08-08 NOTE — HOME HEALTH
"SOC Note: Pt prior to hospitalization reports he was feeling fine, had no issues or symptoms but a mass was found in his upper R lobe of his lungs. Further workup was completed to see if any other cancerous areas found and none noted. Pt elected to have R lobectomy performed to remove mass at this time. Surgery was on 7.31.23 and pt did well post op but needed chest tube placement for air leak. Leak still present several days later so pt was sent home with chest tube in place. About 150cc serousangiounos fluid noted from about 24hrs until now present at this time. Pt does have a moist productive cough with reports of a tan colored sputum. Pt reports he feels like he got hit by a truck since coming home yesterday. All vitals normal at this time. Lung sounds are even throughout with small rhonchi noted in RLL. Pt encouraged to continue coughing and deep breathing to help clear secreations as well as drinking plent of water. Pt verbalizes understanding.     Home Health ordered for: [facility] 3w1, 2w1, 1w7    Reason for Hosp/Primary Dx/Co-morbidities: Lung cancer with R lobectomy, HTN, HLD, depression, anxiety    Focus of Care: Lung cancer with lobectomy post-op care    Patient's goal(s):\"to get back to living life\"    Current Functional status/mobility/DME: ambulates unassisted at this time, should have supervision for safety    HB status/Living Arrangements: lives alone, 7 steps to exit home- split level    Skin Integrity/wound status: incisions to R side of chest, chest tube in place    Code Status: full    Fall Risk/Safety concerns: high    Medication issues/Concerns: none noted    SDOH Barriers (i.e. caregiver concerns, social isolation, transportation, food insecurity, environment, income etc.)/Need for MSW: no    Plan for next visit:   CP assess  incisional assess  chest tube monitoring  assess for s.s of PNA  assess pain and cough  assess need for antibiotics or chest x ray"

## 2023-08-08 NOTE — PROGRESS NOTES
[facility] to provide Home Care services. Patient agreeable and denies other HH at this time. Contact address and information confirmed.

## 2023-08-09 ENCOUNTER — HOME CARE VISIT (OUTPATIENT)
Dept: HOME HEALTH SERVICES | Facility: HOME HEALTHCARE | Age: 83
End: 2023-08-09
Payer: MEDICARE

## 2023-08-09 ENCOUNTER — READMISSION MANAGEMENT (OUTPATIENT)
Dept: CALL CENTER | Facility: HOSPITAL | Age: 83
End: 2023-08-09
Payer: MEDICARE

## 2023-08-09 ENCOUNTER — TELEPHONE (OUTPATIENT)
Dept: SURGERY | Facility: CLINIC | Age: 83
End: 2023-08-09
Payer: MEDICARE

## 2023-08-09 VITALS
HEART RATE: 79 BPM | DIASTOLIC BLOOD PRESSURE: 84 MMHG | OXYGEN SATURATION: 97 % | TEMPERATURE: 97.4 F | RESPIRATION RATE: 18 BRPM | SYSTOLIC BLOOD PRESSURE: 150 MMHG

## 2023-08-09 PROCEDURE — G0299 HHS/HOSPICE OF RN EA 15 MIN: HCPCS

## 2023-08-09 NOTE — TELEPHONE ENCOUNTER
Call placed to patient to remind of need for CXR prior to appt with Dr. White on Friday; phone rang with no answer and no voicemail.     Call placed to emergency contact, [place], advised of need to speak with patient. States patient may be on phone and advised he does have two numbers. Cell number is [phone] however he typically does not use that unless he is in hospital. Advised will reach back out to him to let him know about the CXR.

## 2023-08-09 NOTE — TELEPHONE ENCOUNTER
Call back from patient, advised of need for CXR prior to appt here. Confirmed location of office as well. Patient expressed understanding of all discussed.

## 2023-08-09 NOTE — OUTREACH NOTE
CT Surgery Week 1 Survey      Flowsheet Row Responses   [facility] patient discharged from? [facility]   Does the patient have one of the following disease processes/diagnoses(primary or secondary)? Cardiothoracic surgery   Week 1 attempt successful? Yes   Call start time 1103   Call end time 1105   Discharge diagnosis Bronchoscopy thoracoscopy upper lobectomy   Meds reviewed with patient/caregiver? Yes   Is the patient having any side effects they believe may be caused by any medication additions or changes? No   Does the patient have all medications related to this admission filled (includes all antibiotics, pain medications, cardiac medications, etc.) Yes   Is the patient taking all medications as directed (includes completed medication regime)? Yes   Does the patient have a primary care provider?  Yes   Does the patient have an appointment scheduled with their C/T surgeon? Yes   Has the patient kept scheduled appointments due by today? N/A   Comments surgeon Friday   What is the Home health agency?  [facility]   Has home health visited the patient within 72 hours of discharge? Yes   Psychosocial issues? Yes   Did the patient receive a copy of their discharge instructions? Yes   Nursing interventions Reviewed instructions with patient   What is the patient's perception of their health status since discharge? Improving   Is the patient /caregiver able to teach back basic post-op care? Continue use of incentive spirometry at least 1 week post discharge, Practice cough and deep breath every 4 hours while awake, Hold pillow to support chest when coughing, Drive as instructed by MD in discharge instructions, No tub bath, swimming, or hot tub until instructed by MD, Use a clean wash cloth and antibacterial bar or liquid soap to clean incisions, Lifting as instructed by MD in discharge instructions   Is the patient/caregiver able to teach back signs and symptoms of incisional infection? Fever, Pus or odor from  incision, Incisional warmth, Increased drainage or bleeding   Is the patient/caregiver able to teach back steps to recovery at home? Set small, achievable goals for return to baseline health, Eat a well-balance diet, Rest and rebuild strength, gradually increase activity, Make a list of questions for surgeon's appointment   Additional teach back comments Home health was there during call.  States he is doing well.  Still has chest tube and hoping to get it out when he has follow up with surgeon.   Week 1 call completed? Yes   Graduated Yes   Wrap up additional comments Denies questions or needs at this time.   Call end time 1105              Sandi ZARATE - Licensed Nurse

## 2023-08-09 NOTE — HOME HEALTH
Routine Visit Note: Pt reports he is feeling much better today than he did yesterday. Pts color is much better today and he is not clammy and sweaty. Pt was able to get AC unit in home fixed yesterday and that has improved his living conditions greatly. Pt reports a great amount of pain with coughing and deep breathing still at this time. Pts chest tube has drained about 100cc since yesterday more of a clear yellow drainage at this time. Pt reports his abdominal discomfort has also gone away but he does feel like he might be starting to get constipated. Pt encouraged to drink plenty of water and that he might have to start a stool softener while he is taking this pain medication. Pt verbalizes understanding. Dressing to chest tube site changed at this time, drainage noted coming from around tubing. Some reddness around insertion site as well but not worsening from yesterday. Pt encouraged to continue to cough and deep breath as much as possible to help keep his lungs open. Pt verbalizes understanding.     Skill/education provided: Lung assess, PNA monitoring, incisional monitoring, chest tube education    Patient/caregiver response: Pt reports understanding    Plan for next visit:   CP assess  respiratory assess with education  assess cough and sputum  assess chest tube  assess pain and falls  assess constipation

## 2023-08-10 ENCOUNTER — HOME CARE VISIT (OUTPATIENT)
Dept: HOME HEALTH SERVICES | Facility: HOME HEALTHCARE | Age: 83
End: 2023-08-10
Payer: MEDICARE

## 2023-08-10 VITALS
HEART RATE: 97 BPM | RESPIRATION RATE: 17 BRPM | DIASTOLIC BLOOD PRESSURE: 74 MMHG | TEMPERATURE: 97.4 F | SYSTOLIC BLOOD PRESSURE: 136 MMHG | OXYGEN SATURATION: 98 %

## 2023-08-10 PROCEDURE — G0299 HHS/HOSPICE OF RN EA 15 MIN: HCPCS

## 2023-08-10 NOTE — HOME HEALTH
Routine Visit Note: Pt reports pain is still pretty bad at this time but not worsening. Pt can relax and have no pain but if he coughs or deep breathes or moves in certain ways he states it is a very sharp stabbing pain in his R side. Pt continues to cough up some white colored sputum at this time and have a rattle to his breathing. Pts chest tube has about another 100cc of fluid in container at this time as well. Pt is anxious to see what MD has to say tomorrow and hopes to get tube out but realizes with his amount of drainage it may not happen.     Skill/education provided: respiratory assess and education, chest tube monitoring with dressing change    Patient/caregiver response: Pt tolerates and verbalizes understanding    Plan for next visit:   CP assess  follow up on MD appt  assess chest tube if still present  assess pain and cough  assess sputum

## 2023-08-11 ENCOUNTER — HOSPITAL ENCOUNTER (OUTPATIENT)
Dept: GENERAL RADIOLOGY | Facility: HOSPITAL | Age: 83
Discharge: HOME | End: 2023-08-11
Admitting: NURSE PRACTITIONER
Payer: MEDICARE

## 2023-08-11 ENCOUNTER — OFFICE VISIT (OUTPATIENT)
Dept: SURGERY | Facility: CLINIC | Age: 83
End: 2023-08-11
Payer: MEDICARE

## 2023-08-11 VITALS
DIASTOLIC BLOOD PRESSURE: 88 MMHG | HEART RATE: 93 BPM | SYSTOLIC BLOOD PRESSURE: 159 MMHG | WEIGHT: 194 LBS | OXYGEN SATURATION: 97 % | BODY MASS INDEX: 27.77 KG/M2 | TEMPERATURE: 98.2 F | HEIGHT: 70 IN

## 2023-08-11 DIAGNOSIS — C34.91 NON-SMALL CELL CANCER OF RIGHT LUNG: Primary | ICD-10-CM

## 2023-08-11 DIAGNOSIS — C34.91 NON-SMALL CELL CANCER OF RIGHT LUNG: ICD-10-CM

## 2023-08-11 PROCEDURE — 99024 POSTOP FOLLOW-UP VISIT: CPT | Performed by: STUDENT IN AN ORGANIZED HEALTH CARE EDUCATION/TRAINING PROGRAM

## 2023-08-11 PROCEDURE — 3077F SYST BP >= 140 MM HG: CPT | Performed by: STUDENT IN AN ORGANIZED HEALTH CARE EDUCATION/TRAINING PROGRAM

## 2023-08-11 PROCEDURE — 3079F DIAST BP 80-89 MM HG: CPT | Performed by: STUDENT IN AN ORGANIZED HEALTH CARE EDUCATION/TRAINING PROGRAM

## 2023-08-11 PROCEDURE — 71046 X-RAY EXAM CHEST 2 VIEWS: CPT

## 2023-08-11 RX ORDER — GUAIFENESIN 600 MG/1
1200 TABLET, EXTENDED RELEASE ORAL 2 TIMES DAILY
Qty: 56 TABLET | Refills: 0 | Status: SHIPPED | OUTPATIENT
Start: 2023-08-11 | End: 2023-08-25

## 2023-08-11 NOTE — PROGRESS NOTES
"Chief Complaint  Lung Nodule (2 wk post op follow up s/p BRONCHOSCOPY, THORACOSCOPY WITH RIGHT UPPER LOBECTOMY WITH DAVINCI ROBOT, MEDIASTINAL LYMPH NODE DISSECTION, INTERCOSTAL NERVE BLOCK 7/31/2023)    Subjective        Sohan De La Torre presents to [facility] THORACIC SURGERY  History of Present Illness  Mr. De La Torre is a pleasant 82-year-old gentleman who presents today for his first postoperative follow-up visit after undergoing a robotic assisted right upper lobectomy with mediastinal lymph node dissection for a pT1 cN0 M0 adenocarcinoma, nonmucinous papillary, micropapillary and lipidic primary lung cancer.    His hospital course was prolonged with a prolonged air leak.  He presents today after being discharged with a mini 500 chest tube atrium.    He has had on and off pain complaints throughout the week since he was discharged.  He denies any shortness of breath.  Although he is extremely congested on physical exam today.  Objective   Vital Signs:  /88 (BP Location: Right arm, Patient Position: Sitting, Cuff Size: Large Adult)   Pulse 93   Temp 98.2 øF (36.8 øC) (Infrared)   Ht 177.8 cm (70\")   Wt 88 kg (194 lb)   SpO2 97%   BMI 27.84 kg/mý   Estimated body mass index is 27.84 kg/mý as calculated from the following:    Height as of this encounter: 177.8 cm (70\").    Weight as of this encounter: 88 kg (194 lb).           Physical Exam  Vitals reviewed.   Constitutional:       Appearance: Normal appearance.   Cardiovascular:      Rate and Rhythm: Normal rate and regular rhythm.      Pulses: Normal pulses.      Heart sounds: Normal heart sounds.   Pulmonary:      Effort: Pulmonary effort is normal.      Breath sounds: Wheezing and rales present.   Skin:     Capillary Refill: Capillary refill takes less than 2 seconds.      Comments: All port incisions are clean and dry and intact.  I did not appreciate a air leak on physical exam today.     Neurological:      Mental Status: He is " alert.      Result Review :  Chest x-ray from 8/11/2023 was visualized by myself.  I did not appreciate a pneumothorax.           Assessment and Plan   Diagnoses and all orders for this visit:    1. Non-small cell cancer of right lung (Primary)  -     CT Chest Without Contrast; Future    Other orders  -     guaiFENesin (Mucinex) 600 MG 12 hr tablet; Take 2 tablets by mouth 2 (Two) Times a Day for 14 days.  Dispense: 56 tablet; Refill: 0    Mr. De La Torre is a very pleasant 82-year-old gentleman who underwent a robotic assisted right upper lobectomy for non-small cell lung cancer.  His final stage was pT1 cN0 M0, stage Ia3.   I did not appreciate an air leak on physical exam today.  His chest tube was removed at the bedside without issue.  I am going to follow-up in 1 week's time in clinic for removal of his chest tube suture.  I will also order a 3-month noncontrast CT scan of the chest for continued postoperative surveillance.  In addition to his congestion, I have prescribed him 2 weeks worth of Mucinex to help clear his secretions.  Will see him back in 1 week's time.       I spent 30 minutes caring for Sohan on this date of service. This time includes time spent by me in the following activities:preparing for the visit, reviewing tests, obtaining and/or reviewing a separately obtained history, performing a medically appropriate examination and/or evaluation , counseling and educating the patient/family/caregiver, ordering medications, tests, or procedures, referring and communicating with other health care professionals , documenting information in the medical record, independently interpreting results and communicating that information with the patient/family/caregiver, and care coordination  Follow Up   No follow-ups on file.  Patient was given instructions and counseling regarding his condition or for health maintenance advice. Please see specific information pulled into the AVS if appropriate.

## 2023-08-14 ENCOUNTER — HOME CARE VISIT (OUTPATIENT)
Dept: HOME HEALTH SERVICES | Facility: HOME HEALTHCARE | Age: 83
End: 2023-08-14
Payer: COMMERCIAL

## 2023-08-14 VITALS
RESPIRATION RATE: 17 BRPM | HEART RATE: 98 BPM | SYSTOLIC BLOOD PRESSURE: 128 MMHG | DIASTOLIC BLOOD PRESSURE: 78 MMHG | OXYGEN SATURATION: 100 % | TEMPERATURE: 98.2 F

## 2023-08-14 PROCEDURE — G0299 HHS/HOSPICE OF RN EA 15 MIN: HCPCS

## 2023-08-15 ENCOUNTER — PATIENT OUTREACH (OUTPATIENT)
Dept: OTHER | Facility: HOSPITAL | Age: 83
End: 2023-08-15
Payer: MEDICARE

## 2023-08-15 NOTE — PROGRESS NOTES
Called patient. He complains of pain, which he rates a 5. He is taking pain medication as prescribed, although states gabapentin is not helping at all.  He meets with Dr. White this Friday an  Nnamdi.  Encouraged him to contact Dr. White before then if needed for ongoing pain, etc.  Patient verbalized understanding.    The patient reports some shortness of breath although was not discharged home on oxygen.    The patient denies questions/concerns or ongoing resource needs.    I will call next month; encouraged him to call as needed.

## 2023-08-18 ENCOUNTER — OFFICE VISIT (OUTPATIENT)
Dept: SURGERY | Facility: CLINIC | Age: 83
End: 2023-08-18
Payer: MEDICARE

## 2023-08-18 ENCOUNTER — APPOINTMENT (OUTPATIENT)
Dept: GENERAL RADIOLOGY | Facility: HOSPITAL | Age: 83
DRG: 187 | End: 2023-08-18
Payer: MEDICARE

## 2023-08-18 ENCOUNTER — APPOINTMENT (OUTPATIENT)
Dept: INTERVENTIONAL RADIOLOGY/VASCULAR | Facility: HOSPITAL | Age: 83
DRG: 187 | End: 2023-08-18
Payer: MEDICARE

## 2023-08-18 ENCOUNTER — HOSPITAL ENCOUNTER (INPATIENT)
Facility: HOSPITAL | Age: 83
LOS: 1 days | Discharge: HOME | DRG: 187 | End: 2023-08-19
Attending: EMERGENCY MEDICINE
Payer: MEDICARE

## 2023-08-18 VITALS
SYSTOLIC BLOOD PRESSURE: 117 MMHG | HEIGHT: 70 IN | HEART RATE: 107 BPM | TEMPERATURE: 97.8 F | WEIGHT: 194 LBS | OXYGEN SATURATION: 98 % | BODY MASS INDEX: 27.77 KG/M2 | DIASTOLIC BLOOD PRESSURE: 76 MMHG

## 2023-08-18 DIAGNOSIS — R06.02 SHORTNESS OF BREATH: ICD-10-CM

## 2023-08-18 DIAGNOSIS — J90 PLEURAL EFFUSION: ICD-10-CM

## 2023-08-18 DIAGNOSIS — R06.00 DYSPNEA, UNSPECIFIED TYPE: Primary | ICD-10-CM

## 2023-08-18 DIAGNOSIS — C34.91 NON-SMALL CELL CANCER OF RIGHT LUNG: Primary | ICD-10-CM

## 2023-08-18 LAB
ALBUMIN SERPL-MCNC: 3.6 G/DL (ref 3.5–5.2)
ALBUMIN/GLOB SERPL: 1 G/DL
ALP SERPL-CCNC: 135 U/L (ref 39–117)
ALT SERPL W P-5'-P-CCNC: 33 U/L (ref 1–41)
ANION GAP SERPL CALCULATED.3IONS-SCNC: 12 MMOL/L (ref 5–15)
ANION GAP SERPL CALCULATED.3IONS-SCNC: 14 MMOL/L (ref 5–15)
APPEARANCE FLD: ABNORMAL
APTT PPP: 34.7 SECONDS (ref 24–31)
AST SERPL-CCNC: 19 U/L (ref 1–40)
BASOPHILS # BLD AUTO: 0.1 10*3/MM3 (ref 0–0.2)
BASOPHILS # BLD AUTO: 0.1 10*3/MM3 (ref 0–0.2)
BASOPHILS NFR BLD AUTO: 0.5 % (ref 0–1.5)
BASOPHILS NFR BLD AUTO: 0.9 % (ref 0–1.5)
BILIRUB SERPL-MCNC: 0.6 MG/DL (ref 0–1.2)
BUN SERPL-MCNC: 12 MG/DL (ref 8–23)
BUN SERPL-MCNC: 15 MG/DL (ref 8–23)
BUN/CREAT SERPL: 15.2 (ref 7–25)
BUN/CREAT SERPL: 17 (ref 7–25)
CALCIUM SPEC-SCNC: 8.7 MG/DL (ref 8.6–10.5)
CALCIUM SPEC-SCNC: 9 MG/DL (ref 8.6–10.5)
CHLORIDE SERPL-SCNC: 102 MMOL/L (ref 98–107)
CHLORIDE SERPL-SCNC: 102 MMOL/L (ref 98–107)
CO2 SERPL-SCNC: 23 MMOL/L (ref 22–29)
CO2 SERPL-SCNC: 25 MMOL/L (ref 22–29)
COLOR FLD: ABNORMAL
CREAT SERPL-MCNC: 0.79 MG/DL (ref 0.76–1.27)
CREAT SERPL-MCNC: 0.88 MG/DL (ref 0.76–1.27)
D DIMER PPP FEU-MCNC: 6.82 MG/L (FEU) (ref 0–0.81)
DEPRECATED RDW RBC AUTO: 48.6 FL (ref 37–54)
DEPRECATED RDW RBC AUTO: 49.9 FL (ref 37–54)
EGFRCR SERPLBLD CKD-EPI 2021: 86.4 ML/MIN/1.73
EGFRCR SERPLBLD CKD-EPI 2021: 89.2 ML/MIN/1.73
EOSINOPHIL # BLD AUTO: 0.4 10*3/MM3 (ref 0–0.4)
EOSINOPHIL # BLD AUTO: 0.4 10*3/MM3 (ref 0–0.4)
EOSINOPHIL NFR BLD AUTO: 2.6 % (ref 0.3–6.2)
EOSINOPHIL NFR BLD AUTO: 3 % (ref 0.3–6.2)
EOSINOPHIL NFR FLD MANUAL: 15 %
ERYTHROCYTE [DISTWIDTH] IN BLOOD BY AUTOMATED COUNT: 14.5 % (ref 12.3–15.4)
ERYTHROCYTE [DISTWIDTH] IN BLOOD BY AUTOMATED COUNT: 14.6 % (ref 12.3–15.4)
GEN 5 2HR TROPONIN T REFLEX: 19 NG/L
GLOBULIN UR ELPH-MCNC: 3.6 GM/DL
GLUCOSE SERPL-MCNC: 116 MG/DL (ref 65–99)
GLUCOSE SERPL-MCNC: 120 MG/DL (ref 65–99)
HCT VFR BLD AUTO: 40.2 % (ref 37.5–51)
HCT VFR BLD AUTO: 41.6 % (ref 37.5–51)
HGB BLD-MCNC: 13.3 G/DL (ref 13–17.7)
HGB BLD-MCNC: 14 G/DL (ref 13–17.7)
INR PPP: 1.02 (ref 0.93–1.1)
LYMPHOCYTES # BLD AUTO: 1.2 10*3/MM3 (ref 0.7–3.1)
LYMPHOCYTES # BLD AUTO: 1.2 10*3/MM3 (ref 0.7–3.1)
LYMPHOCYTES NFR BLD AUTO: 8.1 % (ref 19.6–45.3)
LYMPHOCYTES NFR BLD AUTO: 9 % (ref 19.6–45.3)
LYMPHOCYTES NFR FLD MANUAL: 79 %
MCH RBC QN AUTO: 30.4 PG (ref 26.6–33)
MCH RBC QN AUTO: 30.5 PG (ref 26.6–33)
MCHC RBC AUTO-ENTMCNC: 33.1 G/DL (ref 31.5–35.7)
MCHC RBC AUTO-ENTMCNC: 33.7 G/DL (ref 31.5–35.7)
MCV RBC AUTO: 90.4 FL (ref 79–97)
MCV RBC AUTO: 91.7 FL (ref 79–97)
MESOTHL CELL NFR FLD MANUAL: 1 %
METHOD: ABNORMAL
MONOCYTES # BLD AUTO: 1.3 10*3/MM3 (ref 0.1–0.9)
MONOCYTES # BLD AUTO: 1.9 10*3/MM3 (ref 0.1–0.9)
MONOCYTES NFR BLD AUTO: 14.6 % (ref 5–12)
MONOCYTES NFR BLD AUTO: 9.3 % (ref 5–12)
MONOCYTES NFR FLD: 3 %
NEUTROPHILS NFR BLD AUTO: 11.4 10*3/MM3 (ref 1.7–7)
NEUTROPHILS NFR BLD AUTO: 72.5 % (ref 42.7–76)
NEUTROPHILS NFR BLD AUTO: 79.5 % (ref 42.7–76)
NEUTROPHILS NFR BLD AUTO: 9.5 10*3/MM3 (ref 1.7–7)
NEUTROPHILS NFR FLD MANUAL: 2 %
NRBC BLD AUTO-RTO: 0.1 /100 WBC (ref 0–0.2)
NRBC BLD AUTO-RTO: 0.1 /100 WBC (ref 0–0.2)
NT-PROBNP SERPL-MCNC: 1137 PG/ML (ref 0–1800)
NUC CELL # FLD: 1659 /MM3
PH FLD: 8 [PH] (ref 6.5–7.5)
PLATELET # BLD AUTO: 482 10*3/MM3 (ref 140–450)
PLATELET # BLD AUTO: 567 10*3/MM3 (ref 140–450)
PMV BLD AUTO: 8.2 FL (ref 6–12)
PMV BLD AUTO: 8.3 FL (ref 6–12)
POTASSIUM SERPL-SCNC: 3.8 MMOL/L (ref 3.5–5.2)
POTASSIUM SERPL-SCNC: 4 MMOL/L (ref 3.5–5.2)
PROT SERPL-MCNC: 7.2 G/DL (ref 6–8.5)
PROTHROMBIN TIME: 10.9 SECONDS (ref 9.6–11.7)
RBC # BLD AUTO: 4.39 10*6/MM3 (ref 4.14–5.8)
RBC # BLD AUTO: 4.61 10*6/MM3 (ref 4.14–5.8)
SODIUM SERPL-SCNC: 139 MMOL/L (ref 136–145)
SODIUM SERPL-SCNC: 139 MMOL/L (ref 136–145)
TROPONIN T DELTA: 3 NG/L
TROPONIN T SERPL HS-MCNC: 16 NG/L
WBC NRBC COR # BLD: 13.2 10*3/MM3 (ref 3.4–10.8)
WBC NRBC COR # BLD: 14.4 10*3/MM3 (ref 3.4–10.8)

## 2023-08-18 PROCEDURE — 0W993ZZ DRAINAGE OF RIGHT PLEURAL CAVITY, PERCUTANEOUS APPROACH: ICD-10-PCS | Performed by: RADIOLOGY

## 2023-08-18 PROCEDURE — 71046 X-RAY EXAM CHEST 2 VIEWS: CPT

## 2023-08-18 PROCEDURE — 71045 X-RAY EXAM CHEST 1 VIEW: CPT

## 2023-08-18 PROCEDURE — 0 LIDOCAINE 1 % SOLUTION: Performed by: RADIOLOGY

## 2023-08-18 PROCEDURE — 82945 GLUCOSE OTHER FLUID: CPT | Performed by: INTERNAL MEDICINE

## 2023-08-18 PROCEDURE — 93005 ELECTROCARDIOGRAM TRACING: CPT | Performed by: EMERGENCY MEDICINE

## 2023-08-18 PROCEDURE — 36415 COLL VENOUS BLD VENIPUNCTURE: CPT | Performed by: EMERGENCY MEDICINE

## 2023-08-18 PROCEDURE — 99285 EMERGENCY DEPT VISIT HI MDM: CPT

## 2023-08-18 PROCEDURE — 88184 FLOWCYTOMETRY/ TC 1 MARKER: CPT

## 2023-08-18 PROCEDURE — 87070 CULTURE OTHR SPECIMN AEROBIC: CPT | Performed by: INTERNAL MEDICINE

## 2023-08-18 PROCEDURE — 82150 ASSAY OF AMYLASE: CPT | Performed by: INTERNAL MEDICINE

## 2023-08-18 PROCEDURE — 88185 FLOWCYTOMETRY/TC ADD-ON: CPT

## 2023-08-18 PROCEDURE — 3074F SYST BP LT 130 MM HG: CPT | Performed by: STUDENT IN AN ORGANIZED HEALTH CARE EDUCATION/TRAINING PROGRAM

## 2023-08-18 PROCEDURE — C1729 CATH, DRAINAGE: HCPCS

## 2023-08-18 PROCEDURE — 88108 CYTOPATH CONCENTRATE TECH: CPT | Performed by: INTERNAL MEDICINE

## 2023-08-18 PROCEDURE — 80053 COMPREHEN METABOLIC PANEL: CPT | Performed by: EMERGENCY MEDICINE

## 2023-08-18 PROCEDURE — 3078F DIAST BP <80 MM HG: CPT | Performed by: STUDENT IN AN ORGANIZED HEALTH CARE EDUCATION/TRAINING PROGRAM

## 2023-08-18 PROCEDURE — 83880 ASSAY OF NATRIURETIC PEPTIDE: CPT | Performed by: EMERGENCY MEDICINE

## 2023-08-18 PROCEDURE — 85379 FIBRIN DEGRADATION QUANT: CPT | Performed by: EMERGENCY MEDICINE

## 2023-08-18 PROCEDURE — 99222 1ST HOSP IP/OBS MODERATE 55: CPT | Performed by: STUDENT IN AN ORGANIZED HEALTH CARE EDUCATION/TRAINING PROGRAM

## 2023-08-18 PROCEDURE — 84484 ASSAY OF TROPONIN QUANT: CPT | Performed by: EMERGENCY MEDICINE

## 2023-08-18 PROCEDURE — 83986 ASSAY PH BODY FLUID NOS: CPT | Performed by: INTERNAL MEDICINE

## 2023-08-18 PROCEDURE — 25010000002 ONDANSETRON PER 1 MG: Performed by: INTERNAL MEDICINE

## 2023-08-18 PROCEDURE — 85025 COMPLETE CBC W/AUTO DIFF WBC: CPT | Performed by: EMERGENCY MEDICINE

## 2023-08-18 PROCEDURE — 85730 THROMBOPLASTIN TIME PARTIAL: CPT | Performed by: RADIOLOGY

## 2023-08-18 PROCEDURE — 89051 BODY FLUID CELL COUNT: CPT | Performed by: INTERNAL MEDICINE

## 2023-08-18 PROCEDURE — 84157 ASSAY OF PROTEIN OTHER: CPT | Performed by: INTERNAL MEDICINE

## 2023-08-18 PROCEDURE — 85025 COMPLETE CBC W/AUTO DIFF WBC: CPT | Performed by: INTERNAL MEDICINE

## 2023-08-18 PROCEDURE — 99024 POSTOP FOLLOW-UP VISIT: CPT | Performed by: STUDENT IN AN ORGANIZED HEALTH CARE EDUCATION/TRAINING PROGRAM

## 2023-08-18 PROCEDURE — 87205 SMEAR GRAM STAIN: CPT | Performed by: INTERNAL MEDICINE

## 2023-08-18 PROCEDURE — 76942 ECHO GUIDE FOR BIOPSY: CPT

## 2023-08-18 PROCEDURE — 85610 PROTHROMBIN TIME: CPT | Performed by: RADIOLOGY

## 2023-08-18 RX ORDER — ONDANSETRON 2 MG/ML
4 INJECTION INTRAMUSCULAR; INTRAVENOUS EVERY 6 HOURS PRN
Status: DISCONTINUED | OUTPATIENT
Start: 2023-08-18 | End: 2023-08-19 | Stop reason: HOSPADM

## 2023-08-18 RX ORDER — POLYETHYLENE GLYCOL 3350 17 G/17G
17 POWDER, FOR SOLUTION ORAL DAILY PRN
Status: DISCONTINUED | OUTPATIENT
Start: 2023-08-18 | End: 2023-08-19 | Stop reason: HOSPADM

## 2023-08-18 RX ORDER — ASPIRIN 81 MG/1
81 TABLET ORAL NIGHTLY
Status: DISCONTINUED | OUTPATIENT
Start: 2023-08-18 | End: 2023-08-19 | Stop reason: HOSPADM

## 2023-08-18 RX ORDER — VENLAFAXINE HYDROCHLORIDE 75 MG/1
150 CAPSULE, EXTENDED RELEASE ORAL NIGHTLY
Status: DISCONTINUED | OUTPATIENT
Start: 2023-08-18 | End: 2023-08-19 | Stop reason: HOSPADM

## 2023-08-18 RX ORDER — HYDROCHLOROTHIAZIDE 25 MG/1
25 TABLET ORAL NIGHTLY
Status: DISCONTINUED | OUTPATIENT
Start: 2023-08-18 | End: 2023-08-19 | Stop reason: HOSPADM

## 2023-08-18 RX ORDER — AMOXICILLIN 250 MG
2 CAPSULE ORAL 2 TIMES DAILY
Status: DISCONTINUED | OUTPATIENT
Start: 2023-08-18 | End: 2023-08-19 | Stop reason: HOSPADM

## 2023-08-18 RX ORDER — SODIUM CHLORIDE 0.9 % (FLUSH) 0.9 %
10 SYRINGE (ML) INJECTION AS NEEDED
Status: DISCONTINUED | OUTPATIENT
Start: 2023-08-18 | End: 2023-08-19 | Stop reason: HOSPADM

## 2023-08-18 RX ORDER — SODIUM CHLORIDE 0.9 % (FLUSH) 0.9 %
10 SYRINGE (ML) INJECTION EVERY 12 HOURS SCHEDULED
Status: DISCONTINUED | OUTPATIENT
Start: 2023-08-18 | End: 2023-08-19 | Stop reason: HOSPADM

## 2023-08-18 RX ORDER — GUAIFENESIN 600 MG/1
1200 TABLET, EXTENDED RELEASE ORAL 2 TIMES DAILY
Status: DISCONTINUED | OUTPATIENT
Start: 2023-08-18 | End: 2023-08-19 | Stop reason: HOSPADM

## 2023-08-18 RX ORDER — ACETAMINOPHEN 325 MG/1
650 TABLET ORAL EVERY 4 HOURS PRN
Status: DISCONTINUED | OUTPATIENT
Start: 2023-08-18 | End: 2023-08-19 | Stop reason: HOSPADM

## 2023-08-18 RX ORDER — SODIUM CHLORIDE 9 MG/ML
40 INJECTION, SOLUTION INTRAVENOUS AS NEEDED
Status: DISCONTINUED | OUTPATIENT
Start: 2023-08-18 | End: 2023-08-19 | Stop reason: HOSPADM

## 2023-08-18 RX ORDER — ALPRAZOLAM 0.5 MG/1
0.25 TABLET ORAL 3 TIMES DAILY PRN
Status: DISCONTINUED | OUTPATIENT
Start: 2023-08-18 | End: 2023-08-19 | Stop reason: HOSPADM

## 2023-08-18 RX ORDER — OXYCODONE HYDROCHLORIDE 5 MG/1
5 TABLET ORAL EVERY 4 HOURS PRN
COMMUNITY

## 2023-08-18 RX ORDER — BISACODYL 10 MG
10 SUPPOSITORY, RECTAL RECTAL DAILY PRN
Status: DISCONTINUED | OUTPATIENT
Start: 2023-08-18 | End: 2023-08-19 | Stop reason: HOSPADM

## 2023-08-18 RX ORDER — LIDOCAINE HYDROCHLORIDE 10 MG/ML
INJECTION, SOLUTION INFILTRATION; PERINEURAL AS NEEDED
Status: COMPLETED | OUTPATIENT
Start: 2023-08-18 | End: 2023-08-18

## 2023-08-18 RX ORDER — ATORVASTATIN CALCIUM 40 MG/1
40 TABLET, FILM COATED ORAL NIGHTLY
Status: DISCONTINUED | OUTPATIENT
Start: 2023-08-18 | End: 2023-08-19 | Stop reason: HOSPADM

## 2023-08-18 RX ORDER — BISACODYL 5 MG/1
5 TABLET, DELAYED RELEASE ORAL DAILY PRN
Status: DISCONTINUED | OUTPATIENT
Start: 2023-08-18 | End: 2023-08-19 | Stop reason: HOSPADM

## 2023-08-18 RX ADMIN — ALPRAZOLAM 0.25 MG: 0.5 TABLET ORAL at 21:20

## 2023-08-18 RX ADMIN — ATORVASTATIN CALCIUM 40 MG: 40 TABLET, FILM COATED ORAL at 21:21

## 2023-08-18 RX ADMIN — ASPIRIN 81 MG: 81 TABLET, COATED ORAL at 21:20

## 2023-08-18 RX ADMIN — LIDOCAINE HYDROCHLORIDE 4 ML: 10 INJECTION, SOLUTION INFILTRATION; PERINEURAL at 15:46

## 2023-08-18 RX ADMIN — VENLAFAXINE HYDROCHLORIDE 150 MG: 75 CAPSULE, EXTENDED RELEASE ORAL at 21:20

## 2023-08-18 RX ADMIN — HYDROCHLOROTHIAZIDE 25 MG: 25 TABLET ORAL at 21:21

## 2023-08-18 RX ADMIN — Medication 10 ML: at 21:33

## 2023-08-18 RX ADMIN — GUAIFENESIN 1200 MG: 600 TABLET, EXTENDED RELEASE ORAL at 21:19

## 2023-08-18 RX ADMIN — ONDANSETRON 4 MG: 2 INJECTION INTRAMUSCULAR; INTRAVENOUS at 21:32

## 2023-08-18 RX ADMIN — DILTIAZEM HYDROCHLORIDE 30 MG: 30 TABLET, FILM COATED ORAL at 21:19

## 2023-08-18 NOTE — Clinical Note
Level of Care: Telemetry [5]   Admitting Physician: BABATUNDE OAKES [[id number]]   Attending Physician: BABATUNDE OAKES [[id number]]

## 2023-08-18 NOTE — CONSULTS
Inpatient Thoracic Surgery Consult  Consult performed by: Zuhair White MD PhD  Consult ordered by: Harlan Johnston MD        Patient Care Team:  Tee Nicole MD as PCP - General (Internal Medicine)  Gabriel Joel MD as Consulting Physician (Cardiology)  Alisha Segura, RN as Nurse Navigator    Chief Complaint   Patient presents with    Shortness of Breath              Subjective     Shortness of Breath    Mr. De La Torre is a pleasant 82-year-old gentleman who underwent a robotic assisted right upper lobectomy on 7/31/2023 for a pT1 cN0 M0 adenocarcinoma of the upper lobe.  His hospital course was prolonged by a intermittent air leak.  He was ultimately discharged with a chest tube in place due to this intermittent airleak.  He was seen in clinic last week on 8/11/2023 and at that time his air leak had resolved and we removed his chest tube.  He represented to clinic today stating that he had become short of breath on Tuesday evening into early Wednesday morning.  He was satting appropriately on physical exam in the office but I was concerned that he had either had a pneumothorax, postoperative pneumonia or pleural effusion.  He was sent to the emergency department for evaluation.  He denies shortness of breath at rest but he states whenever he gets up and walks 5 to 10 feet he does become winded.  On chest x-ray it shows a mild to moderate right-sided pleural effusion.  His lung is appropriately expanded.  He has a mild leukocytosis of 14,000.  Review of Systems   Respiratory:  Positive for shortness of breath.    All other systems reviewed and are negative.     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     Depression     Femoral thrombosis     Foot pain, left     R/T PVD    Hyperlipidemia     Hypertension      Impaired proprioception     LEFT FOOT    Ischemia of left lower extremity     Low back pain     Lung mass     UPPER RIGHT LOBE    PONV (postoperative nausea and vomiting)     PVD (peripheral vascular disease)     Spondylosis     L5-S1     Past Surgical History:   Procedure Laterality Date    AMPUTATION DIGIT Left 02/11/2022    Procedure: LEFT THIRD TOE AMPUTATION;  Surgeon: Sohan Burger MD;  Location: [facility] OR;  Service: Vascular;  Laterality: Left;    ATHRECTOMY ILIAC, FEMORAL, TIBIAL ARTERY Left 01/25/2022    Procedure: AORTO LIAC  FEMORAL LEFT POPLITEAL AND ANTERIOR TIBIAL LASER  ATHERECTOMY AND ANGIOPLASTY. LEFT FEMORAL AND POPLITEAL ARTERY STENT PLACEMENT;  Surgeon: Sohan Burger MD;  Location: [facility] OR 18/19;  Service: Vascular;  Laterality: Left;    ATHRECTOMY ILIAC, FEMORAL, TIBIAL ARTERY Left 06/28/2022    Procedure: AIF, Bilateral Lower Extremity Angiogram;  Surgeon: Sohan Burger MD;  Location: [facility] OR 18/19;  Service: Vascular;  Laterality: Left;    ATHRECTOMY ILIAC, FEMORAL, TIBIAL ARTERY Left 11/04/2022    Procedure: AORTO ILIAC FEMORAL LEFT LEG DRUG COATED BALLOON ANGIOPLASTY, LASER ATHERECTOMY, COIL EMBOLIZATION;  Surgeon: Sohan Burger MD;  Location: [facility] OR 18/19;  Service: Vascular;  Laterality: Left;    BRONCHOSCOPY WITH ION ROBOTIC ASSIST N/A 7/13/2023    Procedure: BRONCHOSCOPY WITH ION ROBOT UNDER FLUOROSCOPY, ENDOBRONCHIAL ULTRASOUND, FINE NEEDLE ASPIRATION, BIOPSIES, AND LAVAGE;  Surgeon: Zuhair White MD PhD;  Location: [facility] ENDOSCOPY;  Service: Robotics - Pulmonary;  Laterality: N/A;  pre: lung mass  post: lung mass    CATARACT EXTRACTION, BILATERAL      ENDOSCOPY      FEMORAL TIBIAL BYPASS Left 07/29/2022    Procedure: LEFT FEM PERONEAL TIBIAL BYPASS WITH INSITU GREATER SAPHENPOUS VEIN;  Surgeon: Sohan Burger MD;  Location: [facility] OR;  Service: Vascular;  Laterality: Left;    LOBECTOMY Right 7/31/2023    Procedure:  BRONCHOSCOPY, THORACOSCOPY WITH RIGHT UPPER LOBECTOMY WITH DAVINCI ROBOT, MEDIASTINAL LYMPH NODE DISSECTION, INTERCOSTAL NERVE BLOCK;  Surgeon: Zuhair White MD PhD;  Location: [facility];  Service: Robotics - DaVinci;  Laterality: Right;    TEMPORAL ARTERY BIOPSY Right 2023    Procedure: RIGHT TEMPORAL ARTERY BIOPSY;  Surgeon: Sohan Burger MD;  Location: [facility] OR;  Service: Vascular;  Laterality: Right;    WISDOM TOOTH EXTRACTION       Family History   Problem Relation Age of Onset    Malig Hyperthermia Neg Hx      Social History     Socioeconomic History    Marital status:    Tobacco Use    Smoking status: Former     Packs/day: 0.50     Years: 20.00     Pack years: 10.00     Types: Cigarettes     Quit date:      Years since quittin.6     Passive exposure: Past    Smokeless tobacco: Never    Tobacco comments:     25 YEARS AGO   Vaping Use    Vaping Use: Never used   Substance and Sexual Activity    Alcohol use: Yes     Alcohol/week: 3.0 standard drinks     Types: 3 Glasses of wine per week     Comment: 2-3 TIMES WEEKLY    Drug use: Never    Sexual activity: Defer     (Not in a hospital admission)    No Known Allergies    Objective      Vital Signs  Temp:  [97.8 øF (36.6 øC)-98.7 øF (37.1 øC)] 98.7 øF (37.1 øC)  Heart Rate:  [] 108  Resp:  [16-24] 22  BP: (117-153)/(70-99) 147/90    Intake & Output (last day)       None            Physical Exam    Results Review:    I reviewed the patient's new clinical results.  I reviewed the patient's new imaging results and agree with the interpretation.  I reviewed the patient's other test results and agree with the interpretation  Discussed with ED nursing staff, Dr. Johnston and Naida Wil    Imaging Results (Last 24 Hours)       Procedure Component Value Units Date/Time    XR Chest 2 View [[id number]] Collected: 23 1241     Updated: 23 1244    Narrative:      XR CHEST 2 VW    Date of Exam: 2023 12:39 PM  EDT    Indication: Short of breath recent partial pneumonectomy on the right for lung cancer back in July    Comparison: PA and lateral chest radiograph 8/11/2023    Findings:  Small to moderate right basilar pleural effusion is present, and has developed or increased since the prior study. Suspected passive right basilar atelectasis. Right midlung linear subsegmental atelectasis is again noted. The left lung appears clear.   There is some loculated pleural fluid at the right apex, similar to the prior study. The right chest tube has been removed in the interval, and no definite pneumothorax is appreciated. Heart size is within normal limits. Right chest wall subcutaneous air   has resolved. No acute osseous abnormality      Impression:      Impression:    1. Interval removal of the right chest tube. No visible pneumothorax.  2. Small to moderate right pleural effusion has developed or significantly increased since 8/11/2023.  3. Suspected passive right basilar atelectasis. Mild right midlung linear subsegmental atelectasis.      Electronically Signed: Alexandra Romero MD    8/18/2023 12:42 PM EDT    Workstation ID: [id number]            Lab Results:  Lab Results (last 24 hours)       Procedure Component Value Units Date/Time    Comprehensive Metabolic Panel [[id number]] Collected: 08/18/23 1450    Specimen: Blood from Arm, Left Updated: 08/18/23 1453    BNP [[id number]] Collected: 08/18/23 1450    Specimen: Blood from Arm, Left Updated: 08/18/23 1453    High Sensitivity Troponin T [[id number]] Collected: 08/18/23 1450    Specimen: Blood from Arm, Left Updated: 08/18/23 1453    D-dimer, Quantitative [[id number]]  (Abnormal) Collected: 08/18/23 1325    Specimen: Blood from Arm, Right Updated: 08/18/23 1356     D-Dimer, Quantitative 6.82 mg/L (FEU)     Narrative:      According to the assay 's published package insert, a normal (<0.50 mg/L (FEU)) D-dimer result in conjunction with a non-high clinical probability  "assessment, excludes deep vein thrombosis (DVT) and pulmonary embolism (PE) with high sensitivity.    D-dimer values increase with age and this can make VTE exclusion of an older population difficult. To address this, the [employer], based on best available evidence and recent guidelines, recommends that clinicians use age-adjusted D-dimer thresholds in patients greater than 50 years of age with: a) a low probability of PE who do not meet all Pulmonary Embolism Rule Out Criteria, or b) in those with intermediate probability of PE.   The formula for an age-adjusted D-dimer cut-off is \"age/100\".  For example, a 60 year old patient would have an age-adjusted cut-off of 0.60 mg/L (FEU) and an 80 year old 0.80 mg/L (FEU).    CBC & Differential [[id number]]  (Abnormal) Collected: 08/18/23 1325    Specimen: Blood from Arm, Right Updated: 08/18/23 1336    Narrative:      The following orders were created for panel order CBC & Differential.  Procedure                               Abnormality         Status                     ---------                               -----------         ------                     CBC Auto Differential[[id number]]        Abnormal            Final result                 Please view results for these tests on the individual orders.    CBC Auto Differential [[id number]]  (Abnormal) Collected: 08/18/23 1325    Specimen: Blood from Arm, Right Updated: 08/18/23 1336     WBC 14.40 10*3/mm3      RBC 4.61 10*6/mm3      Hemoglobin 14.0 g/dL      Hematocrit 41.6 %      MCV 90.4 fL      MCH 30.5 pg      MCHC 33.7 g/dL      RDW 14.5 %      RDW-SD 48.6 fl      MPV 8.2 fL      Platelets 567 10*3/mm3      Neutrophil % 79.5 %      Lymphocyte % 8.1 %      Monocyte % 9.3 %      Eosinophil % 2.6 %      Basophil % 0.5 %      Neutrophils, Absolute 11.40 10*3/mm3      Lymphocytes, Absolute 1.20 10*3/mm3      Monocytes, Absolute 1.30 10*3/mm3      Eosinophils, Absolute 0.40 10*3/mm3      Basophils, " Absolute 0.10 10*3/mm3      nRBC 0.1 /100 WBC                 Assessment & Plan       * No active hospital problems. *      Assessment & Plan  Mr. De La Torre is a pleasant 82-year-old gentleman who presents from thoracic surgery clinic today in regards to his increased shortness of breath over the last several days.  He is status post right upper lobectomy with mediastinal lymph node dissection on 7/31/2023.  He has a mild to moderate right-sided pleural effusion.  I discussed with the ER staff, I would recommend an ultrasound-guided or CT-guided thoracentesis to help evacuate this effusion.  Thoracic surgery will continue to follow along while he is in the hospital.  We will follow-up with the thoracentesis results.  I discussed the patients findings and our recommendations with patient and nursing staff    Thank you for this consult and allowing us to participate in the care of your patient.  We will follow along with you during this hospitalization.       Zuhair White MD PhD  [facility]  08/18/23  15:09 EDT    Greater than 30 minutes was spent reviewing the patient's chart, radiographic imaging, labs, provider notes, assessing the patient and developing a plan of care which was discussed with the patient/family and other providers.

## 2023-08-18 NOTE — ED NOTES
Dr. Mcgarry messaged about positive simple sepsis screen and why pt triggered. I will order order set if MD wishes.

## 2023-08-18 NOTE — ED NOTES
This nurse attempted to get blood off of IV line, IV line not drawing back. Pt became agitated due to not knowing what was going on.

## 2023-08-18 NOTE — PROGRESS NOTES
"Chief Complaint  Lung Cancer (1 week follow up suture removal; reporting SOA, still junky cough; had some trouble walking into office today. Found it very difficult to breathe and was dizzy. )    Subjective        Sohan De La Torre presents to [facility] THORACIC SURGERY  Lung Cancer    Mr. De La Torre is a pleasant 82-year-old gentleman who underwent a robotic assisted right upper lobectomy on 7/31/2023.  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  He had appropriate pulmonary function for anatomic lung resection.  Unfortunately, he had a prolonged air leak and was discharged with a chest tube in place.  He then subsequently followed up last week in clinic and no longer had an air leak present on exam.  Due to this, he had his chest tube removed.  He presents today for removal of stitch from his chest tube site.  He states that he had been doing well until Tuesday evening into early Wednesday morning when he started to become slightly short of breath and winded with exertion.  He states he is able to get up and walk around but does report increased shortness of breath.  When he arrived to the office today, he was complaining of shortness of breath and some lightheadedness in the waiting room.  His vital signs were immediately checked he was satting 99% on 2 L nasal cannula and his blood pressure and heart rate were appropriate.  Otherwise he has no other complaints at this time.  He denied any fevers or chills in the last week.  Objective   Vital Signs:  /76 (BP Location: Left arm, Patient Position: Sitting, Cuff Size: Large Adult)   Pulse 107   Temp 97.8 øF (36.6 øC) (Infrared)   Ht 177.8 cm (70\")   Wt 88 kg (194 lb)   SpO2 98% Comment: on 2L oxygen  BMI 27.84 kg/mý   Estimated body mass index is 27.84 kg/mý as calculated from the following:    Height as of this encounter: 177.8 cm (70\").    Weight as of this encounter: 88 kg (194 lb).           Physical " Exam  Vitals reviewed.   Constitutional:       Appearance: Normal appearance.   Cardiovascular:      Rate and Rhythm: Normal rate and regular rhythm.      Pulses: Normal pulses.      Heart sounds: Normal heart sounds.   Pulmonary:      Effort: Pulmonary effort is normal.      Breath sounds: Wheezing present.      Comments: I do appreciate breath sounds on the right side.  They might be slightly reduced in comparison to the left.  He has some mild congestion bilaterally and some slight wheezing.  Skin:     Comments: All robotic port sites are clean dry and intact.  His chest tube suture was removed today at the bedside.   Neurological:      Mental Status: He is alert.      Result Review :  No new radiographic exams.           Assessment and Plan   Diagnoses and all orders for this visit:    1. 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 Shortness of breath    Mr De La Torre is a pleasant 82-year-old gentleman who presents for his second postoperative visit after undergoing a robotic assisted upper lobectomy on 7/31/2023.  His operative course was prolonged with a prolonged air leak.  He was ultimately discharged home with a chest tube in place.  He is now 1 week after removal of his chest tube.  He did state he started to have some increased shortness of breath with exertion on Tuesday evening and early Wednesday morning.  This is approximately 4-1/2 days after chest tube removal.  I am concerned that he possibly has a touch of pneumonia versus post pull pneumothorax.  I am going to send him to the emergency department for basic labs and a stat two-view chest x-ray.  I personally called the triage nurse at the [place] emergency department and told them he will be coming in the next few minutes.  We will follow-up with his chest x-ray and labs drawn in the ER.  I will see him back also in clinic at least in 2 weeks time.     I spent 25 minutes caring for Sohan on this date of service. This time includes time  spent by me in the following activities:preparing for the visit, reviewing tests, obtaining and/or reviewing a separately obtained history, performing a medically appropriate examination and/or evaluation , counseling and educating the patient/family/caregiver, ordering medications, tests, or procedures, referring and communicating with other health care professionals , documenting information in the medical record, independently interpreting results and communicating that information with the patient/family/caregiver, and care coordination  Follow Up   No follow-ups on file.  Patient was given instructions and counseling regarding his condition or for health maintenance advice. Please see specific information pulled into the AVS if appropriate.

## 2023-08-18 NOTE — H&P
[facility] Medicine Services  History & Physical    Patient Name: Sohan De La Torre  : 1940  MRN: [id number]  Primary Care Physician:  Tee Nicole MD  Date of admission: 2023  Date and Time of Service: 2023 at 1515.    Subjective      Chief Complaint: Increasing shortness of breath for the past 3 days.    History of Present Illness: Sohan De La Torre is a 82 y.o. male with hypertension, dyslipidemia, depression anxiety, peripheral vascular disease status post bypass surgery on left lower extremity 1 year ago and recently diagnosed right-sided lung cancer with lobectomy on 2023 who presented to [facility] on 2023 complaining of increasing shortness of breath over the past 2 to 3 days.  He says that he has been doing fine since surgery and has stitches and drain were recently removed but about 3 days ago he started getting short of breath which has been increasing and now he is so short of breath that he is unable to walk a few steps without huffing and puffing.  He came to the ER and on the chest x-ray was found to have a large right pleural effusion.  He has some cough going on with pleuritic chest pain on the right side but denies any fever chills abdominal pain nausea vomiting diarrhea constipation hematemesis hematochezia melena hemoptysis dysuria or hematuria.  He has significant anorexia though and has not been able to eat anything for the past few days but he has been drinking a lot of fluids.      Review of Systems   Constitutional: Positive for decreased appetite. Negative for chills, fever and night sweats.   HENT:  Negative for congestion, hoarse voice and sore throat.    Eyes:  Negative for blurred vision and double vision.   Cardiovascular:  Positive for chest pain and dyspnea on exertion. Negative for leg swelling, near-syncope, orthopnea, palpitations and syncope.   Respiratory:  Positive for cough and shortness of breath. Negative for  hemoptysis, sputum production and wheezing.    Endocrine: Negative for cold intolerance and heat intolerance.   Skin:  Negative for itching and rash.   Gastrointestinal:  Positive for anorexia. Negative for abdominal pain, constipation, diarrhea, hematemesis, hematochezia, melena, nausea and vomiting.   Genitourinary:  Negative for dysuria, frequency, hematuria and urgency.   Neurological:  Negative for excessive daytime sleepiness, dizziness, focal weakness, headaches, light-headedness, loss of balance, tremors, vertigo and weakness.        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     Depression     Femoral thrombosis     Foot pain, left     R/T PVD    Hyperlipidemia     Hypertension     Impaired proprioception     LEFT FOOT    Ischemia of left lower extremity     Low back pain     Lung mass     UPPER RIGHT LOBE    PONV (postoperative nausea and vomiting)     PVD (peripheral vascular disease)     Spondylosis     L5-S1       Past Surgical History:   Procedure Laterality Date    AMPUTATION DIGIT Left 02/11/2022    Procedure: LEFT THIRD TOE AMPUTATION;  Surgeon: Sohan Burger MD;  Location: [facility];  Service: Vascular;  Laterality: Left;    ATHRECTOMY ILIAC, FEMORAL, TIBIAL ARTERY Left 01/25/2022    Procedure: AORTO LIAC  FEMORAL LEFT POPLITEAL AND ANTERIOR TIBIAL LASER  ATHERECTOMY AND ANGIOPLASTY. LEFT FEMORAL AND POPLITEAL ARTERY STENT PLACEMENT;  Surgeon: Sohan Burger MD;  Location: [facility] OR 18/19;  Service: Vascular;  Laterality: Left;    ATHRECTOMY ILIAC, FEMORAL, TIBIAL ARTERY Left 06/28/2022    Procedure: AIF, Bilateral Lower Extremity Angiogram;  Surgeon: Sohan Burger MD;  Location: [facility] OR 18/19;  Service: Vascular;  Laterality: Left;    ATHRECTOMY ILIAC, FEMORAL, TIBIAL  ARTERY Left 11/04/2022    Procedure: AORTO ILIAC FEMORAL LEFT LEG DRUG COATED BALLOON ANGIOPLASTY, LASER ATHERECTOMY, COIL EMBOLIZATION;  Surgeon: Sohan Burger MD;  Location: [facility] HYBRID OR 18/19;  Service: Vascular;  Laterality: Left;    BRONCHOSCOPY WITH ION ROBOTIC ASSIST N/A 7/13/2023    Procedure: BRONCHOSCOPY WITH ION ROBOT UNDER FLUOROSCOPY, ENDOBRONCHIAL ULTRASOUND, FINE NEEDLE ASPIRATION, BIOPSIES, AND LAVAGE;  Surgeon: Zuhair White MD PhD;  Location: [facility] ENDOSCOPY;  Service: Robotics - Pulmonary;  Laterality: N/A;  pre: lung mass  post: lung mass    CATARACT EXTRACTION, BILATERAL      ENDOSCOPY      FEMORAL TIBIAL BYPASS Left 07/29/2022    Procedure: LEFT FEM PERONEAL TIBIAL BYPASS WITH INSITU GREATER SAPHENPOUS VEIN;  Surgeon: Sohan Burger MD;  Location: [facility] OR;  Service: Vascular;  Laterality: Left;    LOBECTOMY Right 7/31/2023    Procedure: BRONCHOSCOPY, THORACOSCOPY WITH RIGHT UPPER LOBECTOMY WITH [employer] ROBOT, MEDIASTINAL LYMPH NODE DISSECTION, INTERCOSTAL NERVE BLOCK;  Surgeon: Zuhair White MD PhD;  Location: [facility] OR;  Service: Robotics - DaVinci;  Laterality: Right;    TEMPORAL ARTERY BIOPSY Right 06/20/2023    Procedure: RIGHT TEMPORAL ARTERY BIOPSY;  Surgeon: Sohan Burger MD;  Location: [facility] OR;  Service: Vascular;  Laterality: Right;    WISDOM TOOTH EXTRACTION         Family History: family history is not on file. Otherwise pertinent FHx was reviewed and not pertinent to current issue.    Social History:  reports that he quit smoking about 11 years ago. His smoking use included cigarettes. He has a 10.00 pack-year smoking history. He has been exposed to tobacco smoke. He has never used smokeless tobacco. He reports current alcohol use of about 3.0 standard drinks per week. He reports that he does not use drugs.    Home Medications:  Prior to Admission Medications       Prescriptions Last Dose Informant Patient Reported? Taking?    ALPRAZolam  (XANAX) 0.25 MG tablet   Yes No    Take 1 tablet by mouth As Needed. Indications: Feeling Anxious    aspirin 81 MG EC tablet   No No    Take 1 tablet by mouth Every Night. INSTRUCTED TO CONT UNTIL DOS    atorvastatin (LIPITOR) 40 MG tablet  Self Yes No    Take 1 tablet by mouth Every Night. Indications: High Amount of Fats in the Blood    dilTIAZem (CARDIZEM) 30 MG tablet   No No    Take 1 tablet by mouth Every 6 (Six) Hours for 30 days.    guaiFENesin (Mucinex) 600 MG 12 hr tablet   No No    Take 2 tablets by mouth 2 (Two) Times a Day for 14 days.    hydroCHLOROthiazide (HYDRODIURIL) 25 MG tablet   Yes No    Take 1 tablet by mouth Every Night. Indications: Edema    ibuprofen (ADVIL,MOTRIN) 600 MG tablet   Yes No    Take 1 tablet by mouth Every Night. HOLD FOR SURGERY  Indications: Pain    venlafaxine XR (EFFEXOR-XR) 75 MG 24 hr capsule   Yes No    Take 2 capsules by mouth Every Night. TAKES TWO PILLS EVERY NIGHT  Indications: Major Depressive Disorder              Allergies:  No Known Allergies    Objective      Vitals:   Temp:  [97.8 øF (36.6 øC)-98.7 øF (37.1 øC)] 98.7 øF (37.1 øC)  Heart Rate:  [] 108  Resp:  [16-24] 22  BP: (117-153)/(70-99) 147/90    Physical Exam  Constitutional:       General: He is not in acute distress.  HENT:      Head: Normocephalic and atraumatic.      Mouth/Throat:      Mouth: Mucous membranes are dry.      Pharynx: Oropharynx is clear.   Cardiovascular:      Rate and Rhythm: Normal rate and regular rhythm.      Pulses: Normal pulses.      Heart sounds: Normal heart sounds.   Pulmonary:      Effort: Pulmonary effort is normal.      Comments: Decreased breath sounds in the lower half of the right lung.  Abdominal:      Palpations: Abdomen is soft.      Tenderness: There is no abdominal tenderness. There is no rebound.   Musculoskeletal:      Cervical back: Neck supple.      Right lower leg: No edema.      Left lower leg: No edema.   Skin:     General: Skin is warm and dry.    Neurological:      General: No focal deficit present.      Mental Status: He is alert.   Psychiatric:         Mood and Affect: Mood normal.         Behavior: Behavior normal.          Result Review    Result Review:  I have personally reviewed the results from the time of this admission to 8/18/2023 15:11 EDT and agree with these findings:  [x]  Laboratory  []  Microbiology  [x]  Radiology  []  EKG/Telemetry   []  Cardiology/Vascular   []  Pathology  []  Old records  []  Other:  Most notable findings include:   CBC shows WBC of 14.4, H&H of 14 and 41.6 and platelets of 567.  BMP is normal.  LFTs are within normal limits except alkaline phosphatase of 135.  Blood sugar is 120.  Troponin was 16 and BNP was 1137.  Chest x-ray shows interval removal of the right chest tube no visible pneumothorax is small to moderate right pleural effusion has developed a significantly increased since 8/11/2023 suspected passive right basilar atelectasis.  Mild right midlung linear subsegmental atelectasis.      Assessment & Plan        Active Hospital Problems:  Active Hospital Problems    Diagnosis     **Pleural effusion, right      Plan:     82-year-old white male with a past medical history of hypertension, dyslipidemia, depression, peripheral vascular disease and recent diagnosis of right-sided lung cancer status post surgery on 7/31/2023 coming in with shortness of breath which is increasing over the past 3 days and chest x-ray shows moderate right pleural effusion.    #Right pleural effusion:  Causing significant shortness of breath.  No hypoxia though.  Dr. White saw the patient and recommended thoracentesis by IR.  ER physician has called IR and they are planning to do that procedure.  Clear liquids for now.    #Right upper lobe lung cancer:  Status post surgery on 7/31/2023.  Incision seems to be healing well.    #Hypertension:  Blood pressure looks okay, continue home medications.  Continue  diuretic.    #Dyslipidemia:  Continue statin at home dose.    #Peripheral vascular disease.  Status post bypass surgery on left lower extremity a year ago.  Continue aspirin and statin for now.    Patient wants to be DNR.    I have utilized all available immediate resources to obtain, update, or review the patient's current medications.   I confirmed that the patient's Advance Care Plan is present, code status is documented, or surrogate decision maker is listed in the patient's medical record.       DVT prophylaxis:  Mechanical DVT prophylaxis orders are present.    CODE STATUS:    Level Of Support Discussed With: Patient  Code Status (Patient has no pulse and is not breathing): No CPR (Do Not Attempt to Resuscitate)  Medical Interventions (Patient has pulse or is breathing): Full Support    Admission Status:  I believe this patient meets admission status.    I discussed the patient's findings and my recommendations with patient.      Signature: Electronically signed by Yolanda Mcgarry MD, 08/18/23, 15:11 EDT.  [facility] Hospitalist Team

## 2023-08-18 NOTE — ED PROVIDER NOTES
Subjective   History of Present Illness  Chief complaint Short of breath    History of present illness 82-year-old male who had a right upper lobe taken out for lung cancer in the July.  He states he is done well.  It was not metastatic no other treatment required he states over the last couple days increasing shortness of breath cough but nonproductive.  He denies any fever chills sweats no chest pain neck arm jaw pain no dizziness or syncope.  Worse with exertion and better with rest.  No other recent flus viruses long car ride plane ride or travels.  No leg pain or swelling.    Review of Systems   Constitutional:  Negative for chills and fever.   Respiratory:  Positive for cough and shortness of breath. Negative for chest tightness.    Cardiovascular:  Negative for chest pain, palpitations and leg swelling.   Gastrointestinal:  Negative for abdominal pain and vomiting.   Musculoskeletal:  Negative for back pain and neck pain.   Skin:  Positive for wound. Negative for rash.   Neurological:  Negative for dizziness and light-headedness.   Psychiatric/Behavioral:  Negative for confusion.      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     Cancer     Depression     Femoral thrombosis     Foot pain, left     R/T PVD    Hyperlipidemia     Hypertension     Impaired proprioception     LEFT FOOT    Ischemia of left lower extremity     Low back pain     Lung mass     UPPER RIGHT LOBE    PONV (postoperative nausea and vomiting)     PVD (peripheral vascular disease)     Spondylosis     L5-S1       No Known Allergies    Past Surgical History:   Procedure Laterality Date    AMPUTATION DIGIT Left 02/11/2022    Procedure: LEFT THIRD TOE AMPUTATION;  Surgeon: Sohan Burger MD;  Location: [facility];  Service: Vascular;  Laterality: Left;     ATHRECTOMY ILIAC, FEMORAL, TIBIAL ARTERY Left 01/25/2022    Procedure: AORTO LIAC  FEMORAL LEFT POPLITEAL AND ANTERIOR TIBIAL LASER  ATHERECTOMY AND ANGIOPLASTY. LEFT FEMORAL AND POPLITEAL ARTERY STENT PLACEMENT;  Surgeon: Sohan Burger MD;  Location: [facility] OR 18/19;  Service: Vascular;  Laterality: Left;    ATHRECTOMY ILIAC, FEMORAL, TIBIAL ARTERY Left 06/28/2022    Procedure: AIF, Bilateral Lower Extremity Angiogram;  Surgeon: Sohan Burger MD;  Location: [facility] OR 18/19;  Service: Vascular;  Laterality: Left;    ATHRECTOMY ILIAC, FEMORAL, TIBIAL ARTERY Left 11/04/2022    Procedure: AORTO ILIAC FEMORAL LEFT LEG DRUG COATED BALLOON ANGIOPLASTY, LASER ATHERECTOMY, COIL EMBOLIZATION;  Surgeon: Sohan Burger MD;  Location: [facility] OR 18/19;  Service: Vascular;  Laterality: Left;    BRONCHOSCOPY WITH ION ROBOTIC ASSIST N/A 7/13/2023    Procedure: BRONCHOSCOPY WITH ION ROBOT UNDER FLUOROSCOPY, ENDOBRONCHIAL ULTRASOUND, FINE NEEDLE ASPIRATION, BIOPSIES, AND LAVAGE;  Surgeon: Zuhair White MD PhD;  Location: [facility] ENDOSCOPY;  Service: Robotics - Pulmonary;  Laterality: N/A;  pre: lung mass  post: lung mass    CATARACT EXTRACTION, BILATERAL      ENDOSCOPY      FEMORAL TIBIAL BYPASS Left 07/29/2022    Procedure: LEFT FEM PERONEAL TIBIAL BYPASS WITH INSITU GREATER SAPHENPOUS VEIN;  Surgeon: Sohan Burger MD;  Location: [facility] OR;  Service: Vascular;  Laterality: Left;    LOBECTOMY Right 7/31/2023    Procedure: BRONCHOSCOPY, THORACOSCOPY WITH RIGHT UPPER LOBECTOMY WITH [employer] ROBOT, MEDIASTINAL LYMPH NODE DISSECTION, INTERCOSTAL NERVE BLOCK;  Surgeon: Zuhair White MD PhD;  Location: [facility] OR;  Service: Robotics - DaVinci;  Laterality: Right;    TEMPORAL ARTERY BIOPSY Right 06/20/2023    Procedure: RIGHT TEMPORAL ARTERY BIOPSY;  Surgeon: Sohan Burger MD;  Location: [facility] OR;  Service: Vascular;  Laterality: Right;    WISDOM TOOTH EXTRACTION         Family History    Problem Relation Age of Onset    Malig Hyperthermia Neg Hx        Social History     Socioeconomic History    Marital status:    Tobacco Use    Smoking status: Former     Packs/day: 0.50     Years: 20.00     Pack years: 10.00     Types: Cigarettes     Quit date:      Years since quittin.6     Passive exposure: Past    Smokeless tobacco: Never    Tobacco comments:     25 YEARS AGO   Vaping Use    Vaping Use: Never used   Substance and Sexual Activity    Alcohol use: Yes     Alcohol/week: 3.0 standard drinks     Types: 3 Glasses of wine per week     Comment: 2-3 TIMES WEEKLY    Drug use: Never    Sexual activity: Defer     Prior to Admission medications    Medication Sig Start Date End Date Taking? Authorizing Provider   ALPRAZolam (XANAX) 0.25 MG tablet Take 1 tablet by mouth As Needed. Indications: Feeling Anxious 23   Dorene Platt MD   aspirin 81 MG EC tablet Take 1 tablet by mouth Every Night. INSTRUCTED TO CONT UNTIL DOS 23   Sohan Burger MD   atorvastatin (LIPITOR) 40 MG tablet Take 1 tablet by mouth Every Night. Indications: High Amount of Fats in the Blood 21   Dorene Platt MD   dilTIAZem (CARDIZEM) 30 MG tablet Take 1 tablet by mouth Every 6 (Six) Hours for 30 days. 23  Destiney Vyas, DNP, APRN   guaiFENesin (Mucinex) 600 MG 12 hr tablet Take 2 tablets by mouth 2 (Two) Times a Day for 14 days. 23  Zuhair White MD PhD   hydroCHLOROthiazide (HYDRODIURIL) 25 MG tablet Take 1 tablet by mouth Every Night. Indications: Edema 22   Dorene Platt MD   ibuprofen (ADVIL,MOTRIN) 600 MG tablet Take 1 tablet by mouth Every Night. HOLD FOR SURGERY  Indications: Pain    Dorene Platt MD   venlafaxine XR (EFFEXOR-XR) 75 MG 24 hr capsule Take 2 capsules by mouth Every Night. TAKES TWO PILLS EVERY NIGHT  Indications: Major Depressive Disorder 23   Dorene Platt MD   gemfibrozil (LOPID) 600 MG tablet Take 600  mg by mouth 2 (Two) Times a Day Before Meals.  8/3/22  Provider, MD Dorene        Objective   Physical Exam  Constitutional is an 82-year-old male awake alert no acute distress triage vital signs reviewed.  HEENT extraocular muscles are intact pupils equal round reactive sclera clear.  Neck supple no adenopathy no JV no bruits lungs some scattered rhonchi throughout no retractions heart regular without murmur abdomen soft nontender good bowel sounds no peritoneal findings or pulsatile masses.  Extremities pulses equal upper lower extremities no edema cords or Homans' sign no evidence of DVT.  Skin warm and dry without rashes or cellulitic changes neurologic awake alert follows commands monitorings normal without focal weakness.  Procedures           ED Course      Results for orders placed or performed during the hospital encounter of 08/18/23   Body Fluid Culture - Body Fluid, Pleural Cavity    Specimen: Pleural Cavity; Body Fluid   Result Value Ref Range    Gram Stain Rare (1+) WBCs seen     Gram Stain No organisms seen    Comprehensive Metabolic Panel    Specimen: Arm, Left; Blood   Result Value Ref Range    Glucose 120 (H) 65 - 99 mg/dL    BUN 12 8 - 23 mg/dL    Creatinine 0.79 0.76 - 1.27 mg/dL    Sodium 139 136 - 145 mmol/L    Potassium 4.0 3.5 - 5.2 mmol/L    Chloride 102 98 - 107 mmol/L    CO2 25.0 22.0 - 29.0 mmol/L    Calcium 9.0 8.6 - 10.5 mg/dL    Total Protein 7.2 6.0 - 8.5 g/dL    Albumin 3.6 3.5 - 5.2 g/dL    ALT (SGPT) 33 1 - 41 U/L    AST (SGOT) 19 1 - 40 U/L    Alkaline Phosphatase 135 (H) 39 - 117 U/L    Total Bilirubin 0.6 0.0 - 1.2 mg/dL    Globulin 3.6 gm/dL    A/G Ratio 1.0 g/dL    BUN/Creatinine Ratio 15.2 7.0 - 25.0    Anion Gap 12.0 5.0 - 15.0 mmol/L    eGFR 89.2 >60.0 mL/min/1.73   BNP    Specimen: Arm, Left; Blood   Result Value Ref Range    proBNP 1,137.0 0.0 - 1,800.0 pg/mL   D-dimer, Quantitative    Specimen: Arm, Right; Blood   Result Value Ref Range    D-Dimer, Quantitative 6.82  (H) 0.00 - 0.81 mg/L (FEU)   High Sensitivity Troponin T    Specimen: Arm, Left; Blood   Result Value Ref Range    HS Troponin T 16 (H) <15 ng/L   CBC Auto Differential    Specimen: Arm, Right; Blood   Result Value Ref Range    WBC 14.40 (H) 3.40 - 10.80 10*3/mm3    RBC 4.61 4.14 - 5.80 10*6/mm3    Hemoglobin 14.0 13.0 - 17.7 g/dL    Hematocrit 41.6 37.5 - 51.0 %    MCV 90.4 79.0 - 97.0 fL    MCH 30.5 26.6 - 33.0 pg    MCHC 33.7 31.5 - 35.7 g/dL    RDW 14.5 12.3 - 15.4 %    RDW-SD 48.6 37.0 - 54.0 fl    MPV 8.2 6.0 - 12.0 fL    Platelets 567 (H) 140 - 450 10*3/mm3    Neutrophil % 79.5 (H) 42.7 - 76.0 %    Lymphocyte % 8.1 (L) 19.6 - 45.3 %    Monocyte % 9.3 5.0 - 12.0 %    Eosinophil % 2.6 0.3 - 6.2 %    Basophil % 0.5 0.0 - 1.5 %    Neutrophils, Absolute 11.40 (H) 1.70 - 7.00 10*3/mm3    Lymphocytes, Absolute 1.20 0.70 - 3.10 10*3/mm3    Monocytes, Absolute 1.30 (H) 0.10 - 0.90 10*3/mm3    Eosinophils, Absolute 0.40 0.00 - 0.40 10*3/mm3    Basophils, Absolute 0.10 0.00 - 0.20 10*3/mm3    nRBC 0.1 0.0 - 0.2 /100 WBC   Protime-INR    Specimen: Arm, Right; Blood   Result Value Ref Range    Protime 10.9 9.6 - 11.7 Seconds    INR 1.02 0.93 - 1.10   aPTT    Specimen: Arm, Right; Blood   Result Value Ref Range    PTT 34.7 (H) 24.0 - 31.0 seconds   High Sensitivity Troponin T 2Hr    Specimen: Arm, Left; Blood   Result Value Ref Range    HS Troponin T 19 (H) <15 ng/L    Troponin T Delta 3 >=-4 - <+4 ng/L   pH, Body Fluid - Pleural Fluid, Lung, Right Lower Lobe    Specimen: Lung, Right Lower Lobe; Pleural Fluid   Result Value Ref Range    pH, Fluid 8.00 (H) 6.50 - 7.50   Body fluid cell count - Pleural Fluid, Lung, Right Lower Lobe    Specimen: Lung, Right Lower Lobe; Pleural Fluid   Result Value Ref Range    Color, Fluid Orange     Appearance, Fluid Cloudy (A) Clear    Nucleated Cells, Fluid 1,659 /mm3    Method: Automated DXH Analyzer    Body fluid differential - Pleural Fluid, Lung, Right Lower Lobe    Specimen: Lung,  Right Lower Lobe; Pleural Fluid   Result Value Ref Range    Neutrophils, Fluid 2 %    Lymphocytes, Fluid 79 %    Monocytes, Fluid 3 %    Eosinophils, Fluid 15 %    Mesothelial Cells, Fluid 1 %   ECG 12 Lead Chest Pain   Result Value Ref Range    QT Interval 432 ms     XR Chest 2 View    Result Date: 8/18/2023  Impression: 1. Interval removal of the right chest tube. No visible pneumothorax. 2. Small to moderate right pleural effusion has developed or significantly increased since 8/11/2023. 3. Suspected passive right basilar atelectasis. Mild right midlung linear subsegmental atelectasis. Electronically Signed: Alexandra Romero MD  8/18/2023 12:42 PM EDT  Workstation ID: [id number]    XR Chest 1 View    Result Date: 8/18/2023  Impression: 1. Decrease in size of moderate right pleural effusion status post thoracentesis with small right apical pneumothorax measuring 2 cm. 2. Persistent small right pleural effusion. 3. Right perihilar and right basilar airspace disease likely postoperative atelectasis and/or scarring. Electronically Signed: Joe Monahan MD  8/18/2023 4:12 PM EDT  Workstation ID: [id number]    US Thoracentesis    Result Date: 8/18/2023  1. Right thoracentesis yielding 900 mL of clear fluid Electronically Signed: Almas Chun MD  8/18/2023 3:54 PM EDT  Workstation ID: [id number]     Medications   sodium chloride 0.9 % flush 10 mL (has no administration in time range)   ALPRAZolam (XANAX) tablet 0.25 mg (0.25 mg Oral Given 8/18/23 2120)   aspirin EC tablet 81 mg (81 mg Oral Given 8/18/23 2120)   atorvastatin (LIPITOR) tablet 40 mg (40 mg Oral Given 8/18/23 2121)   dilTIAZem (CARDIZEM) tablet 30 mg ( Oral Canceled Entry 8/19/23 0000)   guaiFENesin (MUCINEX) 12 hr tablet 1,200 mg (1,200 mg Oral Given 8/18/23 2119)   hydroCHLOROthiazide (HYDRODIURIL) tablet 25 mg (25 mg Oral Given 8/18/23 2121)   venlafaxine XR (EFFEXOR-XR) 24 hr capsule 150 mg (150 mg Oral Given 8/18/23 2120)   sodium chloride 0.9 % flush 10 mL  (10 mL Intravenous Given 8/18/23 2133)   sodium chloride 0.9 % flush 10 mL (has no administration in time range)   sodium chloride 0.9 % infusion 40 mL (has no administration in time range)   sennosides-docusate (PERICOLACE) 8.6-50 MG per tablet 2 tablet (2 tablets Oral Not Given 8/18/23 2159)     And   polyethylene glycol (MIRALAX) packet 17 g (has no administration in time range)     And   bisacodyl (DULCOLAX) EC tablet 5 mg (has no administration in time range)     And   bisacodyl (DULCOLAX) suppository 10 mg (has no administration in time range)   acetaminophen (TYLENOL) tablet 650 mg (has no administration in time range)   ondansetron (ZOFRAN) injection 4 mg (4 mg Intravenous Given 8/18/23 2132)   lidocaine (XYLOCAINE) 1 % injection (4 mL Infiltration Given 8/18/23 1546)     2:20 PM waiting on the blood to be drawn it was hemolyzed     EKG my interpretation normal sinus rhythm rate 94 patient has a second-degree AV block Mobitz type I QTc of 540 no acute ST elevation is abnormal EKG but unchanged from 8/4/2023     Review of records discharged from [facility] on 7 August after having a right upper lung taken out because of a lung tumor adenocarcinoma basic labs at that time unremarkable                             Medical Decision Making  Medical decision making.  IV established monitor placed my review of sinus rhythm sats good at 94% on room air.  EKG obtained independent review shows a Mobitz type I AV block but unchanged compared to 8/4/2023.  Chest x-ray my independent review shows a moderate right-sided pleural effusion no pneumothorax.  Labs obtained patient CBC unremarkable white count of 14.4 hemoglobin 14 D-dimer is elevated 6.8 but I think this is related to the lung cancer and the moderate-sized pleural effusion.  Patient's chemistries were hemolyzed and waiting on recollect.  Dr. White notified he saw the patient in the ER and requested thoracentesis.  He does not want a chest tube.   Patient's been off of Plavix for a couple of weeks.  The patient on repeat exam tachypneic but still has good sats in the mid 90s.  I do not see evidence to suggest acute pneumonia pneumothorax less likely DVT pulmonary embolism or cardiac etiology just based on his history physical and clinical exam and findings.  Not complete list of all possibilities I talked to interventional radiologist I talked to the hospitalist we discussed the case made aware of the findings and patient will be admitted for further care stable unremarkable ER course.    Problems Addressed:  Dyspnea, unspecified type: complicated acute illness or injury  Pleural effusion: complicated acute illness or injury    Amount and/or Complexity of Data Reviewed  Labs: ordered. Decision-making details documented in ED Course.  Radiology: ordered and independent interpretation performed. Decision-making details documented in ED Course.  ECG/medicine tests: ordered and independent interpretation performed. Decision-making details documented in ED Course.    Risk  Prescription drug management.  Decision regarding hospitalization.        Final diagnoses:   Dyspnea, unspecified type   Pleural effusion       ED Disposition  ED Disposition       ED Disposition   Decision to Admit    Condition   --    Comment   Level of Care: Med/Surg [1]   Diagnosis: Pleural effusion, right [628881]   Admitting Physician: BABATUNDE OAKES [[id number]]   Attending Physician: BABATUNDE OAKES [[id number]]   Certification: I Certify That Inpatient Hospital Services Are Medically Necessary For Greater Than 2 Midnights                 No follow-up provider specified.       Medication List      No changes were made to your prescriptions during this visit.            Harlan Johnston MD  08/18/23 0465

## 2023-08-19 ENCOUNTER — APPOINTMENT (OUTPATIENT)
Dept: GENERAL RADIOLOGY | Facility: HOSPITAL | Age: 83
DRG: 187 | End: 2023-08-19
Payer: MEDICARE

## 2023-08-19 ENCOUNTER — READMISSION MANAGEMENT (OUTPATIENT)
Dept: CALL CENTER | Facility: HOSPITAL | Age: 83
End: 2023-08-19
Payer: MEDICARE

## 2023-08-19 VITALS
HEART RATE: 83 BPM | WEIGHT: 187.2 LBS | HEIGHT: 70 IN | SYSTOLIC BLOOD PRESSURE: 134 MMHG | RESPIRATION RATE: 22 BRPM | DIASTOLIC BLOOD PRESSURE: 65 MMHG | BODY MASS INDEX: 26.8 KG/M2 | OXYGEN SATURATION: 96 % | TEMPERATURE: 97.5 F

## 2023-08-19 PROBLEM — Z90.2 S/P PARTIAL LOBECTOMY OF LUNG: Chronic | Status: ACTIVE | Noted: 2023-08-19

## 2023-08-19 PROBLEM — Z87.891 HISTORY OF CIGARETTE SMOKING: Chronic | Status: ACTIVE | Noted: 2023-08-19

## 2023-08-19 PROBLEM — E78.5 HYPERLIPIDEMIA: Status: ACTIVE | Noted: 2022-01-14

## 2023-08-19 PROBLEM — R06.09 DYSPNEA ON EXERTION: Status: ACTIVE | Noted: 2023-08-19

## 2023-08-19 PROBLEM — I73.9 PERIPHERAL VASCULAR DISEASE: Chronic | Status: ACTIVE | Noted: 2023-08-19

## 2023-08-19 PROBLEM — Z98.890 S/P THORACENTESIS: Status: ACTIVE | Noted: 2023-08-19

## 2023-08-19 LAB
AMYLASE FLD-CCNC: 46 U/L
GLUCOSE FLD-MCNC: 113 MG/DL
PROT FLD-MCNC: 3.7 G/DL
QT INTERVAL: 432 MS

## 2023-08-19 PROCEDURE — 71045 X-RAY EXAM CHEST 1 VIEW: CPT

## 2023-08-19 PROCEDURE — 99232 SBSQ HOSP IP/OBS MODERATE 35: CPT | Performed by: STUDENT IN AN ORGANIZED HEALTH CARE EDUCATION/TRAINING PROGRAM

## 2023-08-19 RX ADMIN — DILTIAZEM HYDROCHLORIDE 30 MG: 30 TABLET, FILM COATED ORAL at 05:55

## 2023-08-19 RX ADMIN — Medication 10 ML: at 07:43

## 2023-08-19 RX ADMIN — GUAIFENESIN 1200 MG: 600 TABLET, EXTENDED RELEASE ORAL at 07:43

## 2023-08-19 NOTE — OUTREACH NOTE
Prep Survey      Flowsheet Row Responses   [facility]   Is LACE score < 7 ? No   Eligibility Readm Mgmt   Discharge diagnosis Pleural effusion, right   Does the patient have one of the following disease processes/diagnoses(primary or secondary)? Other   Does the patient have Home health ordered? No   Is there a DME ordered? No   General alerts for this patient s/p partial lobectomy of lung.   Prep survey completed? Yes            Paola RAY - Registered Nurse

## 2023-08-19 NOTE — PLAN OF CARE
Goal Outcome Evaluation:   Patient for discharge today. Waiting to be seen by MD prior to discharge, delay of dc noted (navigator).

## 2023-08-19 NOTE — PLAN OF CARE
Problem: Adult Inpatient Plan of Care  Goal: Absence of Hospital-Acquired Illness or Injury  Intervention: Identify and Manage Fall Risk  Recent Flowsheet Documentation  Taken 8/19/2023 1000 by Zeferino Saeed RN  Safety Promotion/Fall Prevention:   safety round/check completed   room organization consistent   nonskid shoes/slippers when out of bed   fall prevention program maintained   clutter free environment maintained   assistive device/personal items within reach  Taken 8/19/2023 0800 by Zeferino Saeed RN  Safety Promotion/Fall Prevention:   safety round/check completed   room organization consistent   nonskid shoes/slippers when out of bed   fall prevention program maintained   clutter free environment maintained   assistive device/personal items within reach  Intervention: Prevent Skin Injury  Recent Flowsheet Documentation  Taken 8/19/2023 1000 by Zeferino Saeed RN  Body Position: position changed independently  Taken 8/19/2023 0800 by Zeferino Saeed RN  Body Position: position changed independently  Intervention: Prevent and Manage VTE (Venous Thromboembolism) Risk  Recent Flowsheet Documentation  Taken 8/19/2023 0800 by Zeferino Saeed RN  Activity Management: ambulated to bathroom  Range of Motion: active ROM (range of motion) encouraged  Intervention: Prevent Infection  Recent Flowsheet Documentation  Taken 8/19/2023 1000 by Zeferino Saeed RN  Infection Prevention:   hand hygiene promoted   rest/sleep promoted   single patient room provided  Taken 8/19/2023 0800 by Zeferino Saeed RN  Infection Prevention:   hand hygiene promoted   rest/sleep promoted   single patient room provided  Goal: Optimal Comfort and Wellbeing  Intervention: Provide Person-Centered Care  Recent Flowsheet Documentation  Taken 8/19/2023 0800 by Zeferino Saeed RN  Trust Relationship/Rapport:   care explained   emotional support provided   thoughts/feelings acknowledged     Problem: Fall Injury Risk  Goal: Absence of Fall and Fall-Related  Injury  Intervention: Identify and Manage Contributors  Recent Flowsheet Documentation  Taken 8/19/2023 1000 by Zeferino Saeed RN  Medication Review/Management: medications reviewed  Taken 8/19/2023 0800 by Zeferino Saeed RN  Medication Review/Management: medications reviewed  Self-Care Promotion: independence encouraged  Intervention: Promote Injury-Free Environment  Recent Flowsheet Documentation  Taken 8/19/2023 1000 by Zeferino Saeed RN  Safety Promotion/Fall Prevention:   safety round/check completed   room organization consistent   nonskid shoes/slippers when out of bed   fall prevention program maintained   clutter free environment maintained   assistive device/personal items within reach  Taken 8/19/2023 0800 by Zeferino Saeed RN  Safety Promotion/Fall Prevention:   safety round/check completed   room organization consistent   nonskid shoes/slippers when out of bed   fall prevention program maintained   clutter free environment maintained   assistive device/personal items within reach     Problem: Adjustment to Illness (Sepsis/Septic Shock)  Goal: Optimal Coping  Intervention: Optimize Psychosocial Adjustment to Illness  Recent Flowsheet Documentation  Taken 8/19/2023 0800 by Zeferino Saeed RN  Supportive Measures:   active listening utilized   positive reinforcement provided   verbalization of feelings encouraged  Family/Support System Care:   caregiver stress acknowledged   self-care encouraged   support provided     Problem: Infection Progression (Sepsis/Septic Shock)  Goal: Absence of Infection Signs and Symptoms  Intervention: Initiate Sepsis Management  Recent Flowsheet Documentation  Taken 8/19/2023 1000 by Zeferino Saeed RN  Infection Prevention:   hand hygiene promoted   rest/sleep promoted   single patient room provided  Isolation Precautions: precautions maintained  Taken 8/19/2023 0800 by Zeferino Saeed RN  Stabilization Measures: airway opened  Infection Management: aseptic technique  maintained  Infection Prevention:   hand hygiene promoted   rest/sleep promoted   single patient room provided  Isolation Precautions: precautions maintained  Intervention: Promote Recovery  Recent Flowsheet Documentation  Taken 8/19/2023 0800 by Zeferino Saeed RN  Activity Management: ambulated to bathroom  Airway/Ventilation Support: comfort measures provided  Sleep/Rest Enhancement:   noise level reduced   regular sleep/rest pattern promoted  Intervention: Promote Stabilization  Recent Flowsheet Documentation  Taken 8/19/2023 0800 by Zeferino Saeed RN  Lung Protection Measures: fluid excess minimized     Problem: Breathing Pattern Ineffective  Goal: Effective Breathing Pattern  Intervention: Promote Improved Breathing Pattern  Recent Flowsheet Documentation  Taken 8/19/2023 1000 by Zeferino Saeed RN  Head of Bed (HOB) Positioning: HOB elevated  Taken 8/19/2023 0800 by Zeferino Saeed RN  Supportive Measures:   active listening utilized   positive reinforcement provided   verbalization of feelings encouraged  Head of Bed (HOB) Positioning: HOB elevated  Airway/Ventilation Management: airway patency maintained  Breathing Techniques/Airway Clearance: deep/controlled cough encouraged   Goal Outcome Evaluation:

## 2023-08-19 NOTE — PROGRESS NOTES
"    Chief Complaint: Post operative pleural effusion  S/P: Right-sided thoracentesis  POD #1    Subjective  No events noted overnight.  Approximately 900 cc status post thoracentesis.  Chest x-ray stable this morning.  Much more comfortable on physical exam today compared to yesterday prior to thoracentesis.  Satting appropriate on room air this morning when I rounded.  Vital Signs:  Temp:  [97.5 øF (36.4 øC)-98.4 øF (36.9 øC)] 97.5 øF (36.4 øC)  Heart Rate:  [] 83  Resp:  [16-26] 22  BP: (117-161)/(65-99) 134/65    Intake & Output (last day)         08/18 0701 08/19 0700 08/19 0701 08/20 0700    P.O.  240    Total Intake(mL/kg)  240 (2.8)    Urine (mL/kg/hr) 450 250 (0.5)    Other 900     Stool  0    Total Output 1350 250    Net -1350 -10          Urine Unmeasured Occurrence  1 x    Stool Unmeasured Occurrence  1 x            Objective:  General Appearance:  Comfortable, well-appearing, in no acute distress and not in pain.    Vital signs: (most recent): Blood pressure 134/65, pulse 83, temperature 97.5 øF (36.4 øC), temperature source Oral, resp. rate 22, height 177.8 cm (70\"), weight 84.9 kg (187 lb 3.2 oz), SpO2 96 %.    Lungs:  Normal effort and normal respiratory rate.  He is not in respiratory distress.    Heart: Normal rate.  Regular rhythm.          All prior right-sided chest surgery incisions are clean dry and intact.      Results Review:     I reviewed the patient's new clinical results.  I reviewed the patient's new imaging results and agree with the interpretation.  I reviewed the patient's other test results and agree with the interpretation  I personally viewed and interpreted the patient's EKG/Telemetry data  Discussed with pt and Dr. Conn    Imaging Results (Last 24 Hours)       Procedure Component Value Units Date/Time    XR Chest 1 View [[id number]] Collected: 08/19/23 0820     Updated: 08/19/23 0825    Narrative:      XR CHEST 1 VW    Date of Exam: 8/19/2023 5:06 AM EDT    Indication: " s/p thoracentesis    Comparison: Chest radiograph 8/18/2023    Findings:  Similar small right apical pneumothorax measuring up to 2 cm. No significant interval change in the small to moderate size right effusion. Right perihilar and right basilar opacities favoring atelectasis/scar unchanged. Left lung relatively clear. No new   focal infiltrate, left effusion or left pneumothorax. Heart size unchanged. Aortic atherosclerotic disease.      Impression:      Impression:  No significant interval change.      Electronically Signed: Giovanni Cooper MD    8/19/2023 8:23 AM EDT    Workstation ID: [id number]    XR Chest 1 View [[id number]] Collected: 08/18/23 1609     Updated: 08/18/23 1615    Narrative:      XR CHEST 1 VW    Date of Exam: 8/18/2023 3:55 PM EDT    Indication: Right thoracentesis    Comparison: Chest radiograph 8/18/2023    Findings:  Decrease in size of right pleural effusion status post thoracentesis. There is a persistent small right pleural effusion. Small right apical pneumothorax measures 2 cm. Right perihilar and right basilar airspace disease likely scarring/atelectasis,   unchanged.      Impression:      Impression:  1. Decrease in size of moderate right pleural effusion status post thoracentesis with small right apical pneumothorax measuring 2 cm.  2. Persistent small right pleural effusion.  3. Right perihilar and right basilar airspace disease likely postoperative atelectasis and/or scarring.      Electronically Signed: Joe Monahan MD    8/18/2023 4:12 PM EDT    Workstation ID: [id number]    US Thoracentesis [[id number]] Collected: 08/18/23 1553    Specimen: Body Fluid Updated: 08/18/23 1556    Narrative:      DATE OF EXAM:  8/18/2023 3:32 PM EDT    PROCEDURE:  US THORACENTESIS    INDICATIONS:  Pleural effusion on the right status post right upper lobe lobectomy.  Patient's physician request no chest tube this is Dr. Zuhair White.  Patient off Plavix for 2 weeks    COMPARISON:  No Comparisons  Available    FLUOROSCOPIC TIME:  None    PHYSICIAN MONITORED CONSCIOUS SEDATION TIME:  None minutes    TECHNIQUE:   A detailed explanation of the procedure, including the risks, benefits, and alternatives was provided. A preprocedure timeout was performed. The interventional radiology nurse monitored the patient at all times. The patient was placed upright in the bed   and the right back was ultrasounded, demonstrating a moderate right effusion. The right back was then marked and prepped and draped in the usual sterile fashion. The skin and subcutaneous tissues were anesthetized with 1% lidocaine and a small skin   incision was made. Next, a 4 Uzbek centesis needle was advanced into the collection. A total of 900 mL of clear fluid was aspirated and the needle was removed. The patient tolerated the procedure well, without immediate complication.    FINDINGS:  See above      Impression:        1. Right thoracentesis yielding 900 mL of clear fluid      Electronically Signed: Almas Chun MD    8/18/2023 3:54 PM EDT    Workstation ID: [id number]    XR Chest 2 View [[id number]] Collected: 08/18/23 1241     Updated: 08/18/23 1244    Narrative:      XR CHEST 2 VW    Date of Exam: 8/18/2023 12:39 PM EDT    Indication: Short of breath recent partial pneumonectomy on the right for lung cancer back in July    Comparison: PA and lateral chest radiograph 8/11/2023    Findings:  Small to moderate right basilar pleural effusion is present, and has developed or increased since the prior study. Suspected passive right basilar atelectasis. Right midlung linear subsegmental atelectasis is again noted. The left lung appears clear.   There is some loculated pleural fluid at the right apex, similar to the prior study. The right chest tube has been removed in the interval, and no definite pneumothorax is appreciated. Heart size is within normal limits. Right chest wall subcutaneous air   has resolved. No acute osseous abnormality       Impression:      Impression:    1. Interval removal of the right chest tube. No visible pneumothorax.  2. Small to moderate right pleural effusion has developed or significantly increased since 8/11/2023.  3. Suspected passive right basilar atelectasis. Mild right midlung linear subsegmental atelectasis.      Electronically Signed: Alexandra Romero MD    8/18/2023 12:42 PM EDT    Workstation ID: [id number]            Lab Results:     Lab Results (last 24 hours)       Procedure Component Value Units Date/Time    Body Fluid Culture - Body Fluid, Pleural Cavity [[id number]] Collected: 08/18/23 1727    Specimen: Body Fluid from Pleural Cavity Updated: 08/19/23 0657     Body Fluid Culture No growth at less than 24 hours     Gram Stain Rare (1+) WBCs seen      No organisms seen    Protein, Body Fluid - Pleural Fluid, Pleural Cavity [[id number]] Collected: 08/18/23 1728    Specimen: Pleural Fluid from Pleural Cavity Updated: 08/19/23 0010     Protein, Total, Fluid 3.7 g/dL     Narrative:      No Reference Ranges Established.    A serous fluid total fluid (TP) greater than 50 percent of the serum TP suggests the fluid is an exudate.      1. Pleural TP/Serum TP >0.5  2. Pleural LD/Serum LD >0.6  3. Pleural LD >2/3 of the upper limit of normal for serum LDH    This test was developed, it performance characteristics determined and judged suitable for clinical purposes by [facility].  It has not been cleared or approved by the FDA.  The laboratory is regulated under CLIA as qualified to perform high-complexity testing.     Glucose, Body Fluid - Pleural Fluid, Pleural Cavity [[id number]] Collected: 08/18/23 1728    Specimen: Pleural Fluid from Pleural Cavity Updated: 08/19/23 0010     Glucose, Fluid 113 mg/dL     Narrative:      No Reference Ranges Established.    Serous fluid glucose less than 60 mg/dL or less than 30 mg/dL below serum glucose suggests an infectious or malignant exudate.     This test was  developed, it performance characteristics determined and judged suitable for clinical purposes by [facility].  It has not been cleared or approved by the FDA.  The laboratory is regulated under CLIA as qualified to perform high-complexity testing.     Amylase, Body Fluid - Pleural Fluid, Pleural Cavity [[id number]] Collected: 08/18/23 1728    Specimen: Pleural Fluid from Pleural Cavity Updated: 08/19/23 0010     Amylase, Fluid 46 U/L     Narrative:      No Reference Ranges Established.    This test was developed, it performance characteristics determined and judged suitable for clinical purposes by [facility].  It has not been cleared or approved by the FDA.  The laboratory is regulated under CLIA as qualified to perform high-complexity testing.     Basic Metabolic Panel [[id number]]  (Abnormal) Collected: 08/18/23 2231    Specimen: Blood Updated: 08/18/23 2313     Glucose 116 mg/dL      BUN 15 mg/dL      Creatinine 0.88 mg/dL      Sodium 139 mmol/L      Potassium 3.8 mmol/L      Comment: Slight hemolysis detected by analyzer. Results may be affected.        Chloride 102 mmol/L      CO2 23.0 mmol/L      Calcium 8.7 mg/dL      BUN/Creatinine Ratio 17.0     Anion Gap 14.0 mmol/L      eGFR 86.4 mL/min/1.73     Narrative:      GFR Normal >60  Chronic Kidney Disease <60  Kidney Failure <15    The GFR formula is only valid for adults with stable renal function between ages 18 and 70.    CBC Auto Differential [[id number]]  (Abnormal) Collected: 08/18/23 2231    Specimen: Blood Updated: 08/18/23 2243     WBC 13.20 10*3/mm3      RBC 4.39 10*6/mm3      Hemoglobin 13.3 g/dL      Hematocrit 40.2 %      MCV 91.7 fL      MCH 30.4 pg      MCHC 33.1 g/dL      RDW 14.6 %      RDW-SD 49.9 fl      MPV 8.3 fL      Platelets 482 10*3/mm3      Neutrophil % 72.5 %      Lymphocyte % 9.0 %      Monocyte % 14.6 %      Eosinophil % 3.0 %      Basophil % 0.9 %      Neutrophils, Absolute 9.50  10*3/mm3      Lymphocytes, Absolute 1.20 10*3/mm3      Monocytes, Absolute 1.90 10*3/mm3      Eosinophils, Absolute 0.40 10*3/mm3      Basophils, Absolute 0.10 10*3/mm3      nRBC 0.1 /100 WBC     High Sensitivity Troponin T 2Hr [[id number]]  (Abnormal) Collected: 08/18/23 1949    Specimen: Blood from Arm, Left Updated: 08/18/23 2019     HS Troponin T 19 ng/L      Troponin T Delta 3 ng/L     Narrative:      High Sensitive Troponin T Reference Range:  <10.0 ng/L- Negative Female for AMI  <15.0 ng/L- Negative Male for AMI  >=10 - Abnormal Female indicating possible myocardial injury.  >=15 - Abnormal Male indicating possible myocardial injury.   Clinicians would have to utilize clinical acumen, EKG, Troponin, and serial changes to determine if it is an Acute Myocardial Infarction or myocardial injury due to an underlying chronic condition.         Body Fluid Cell Count With Differential - Pleural Fluid, Lung, Right Lower Lobe [[id number]]  (Abnormal) Collected: 08/18/23 1728    Specimen: Pleural Fluid from Lung, Right Lower Lobe Updated: 08/18/23 1924    Narrative:      The following orders were created for panel order Body Fluid Cell Count With Differential - Pleural Fluid, Lung, Right Lower Lobe.  Procedure                               Abnormality         Status                     ---------                               -----------         ------                     Body fluid cell count - ...[[id number]]  Abnormal            Final result               Body fluid differential ...[[id number]]                      Final result                 Please view results for these tests on the individual orders.    Body fluid differential - Pleural Fluid, Lung, Right Lower Lobe [[id number]] Collected: 08/18/23 1728    Specimen: Pleural Fluid from Lung, Right Lower Lobe Updated: 08/18/23 1924     Neutrophils, Fluid 2 %      Lymphocytes, Fluid 79 %      Monocytes, Fluid 3 %      Eosinophils, Fluid 15 %      Mesothelial Cells, Fluid  1 %     Narrative:      Concentrated Smear by Cytocentrifuge Prep.    Body fluid cell count - Pleural Fluid, Lung, Right Lower Lobe [[id number]]  (Abnormal) Collected: 08/18/23 1728    Specimen: Pleural Fluid from Lung, Right Lower Lobe Updated: 08/18/23 1919     Color, Fluid Orange     Appearance, Fluid Cloudy     Nucleated Cells, Fluid 1,659 /mm3      Method: Automated DXH Analyzer    Narrative:      No reference range established. Physician to interpret results with clinical findings.    pH, Body Fluid - Pleural Fluid, Lung, Right Lower Lobe [[id number]]  (Abnormal) Collected: 08/18/23 1728    Specimen: Pleural Fluid from Lung, Right Lower Lobe Updated: 08/18/23 1841     pH, Fluid 8.00    Flow Cytometry [[id number]] Collected: 08/18/23 1727    Specimen: Body Fluid Updated: 08/18/23 1824    aPTT [[id number]]  (Abnormal) Collected: 08/18/23 1325    Specimen: Blood from Arm, Right Updated: 08/18/23 1527     PTT 34.7 seconds     Protime-INR [[id number]]  (Normal) Collected: 08/18/23 1325    Specimen: Blood from Arm, Right Updated: 08/18/23 1527     Protime 10.9 Seconds      INR 1.02    Comprehensive Metabolic Panel [[id number]]  (Abnormal) Collected: 08/18/23 1450    Specimen: Blood from Arm, Left Updated: 08/18/23 1515     Glucose 120 mg/dL      BUN 12 mg/dL      Creatinine 0.79 mg/dL      Sodium 139 mmol/L      Potassium 4.0 mmol/L      Chloride 102 mmol/L      CO2 25.0 mmol/L      Calcium 9.0 mg/dL      Total Protein 7.2 g/dL      Albumin 3.6 g/dL      ALT (SGPT) 33 U/L      AST (SGOT) 19 U/L      Alkaline Phosphatase 135 U/L      Total Bilirubin 0.6 mg/dL      Globulin 3.6 gm/dL      A/G Ratio 1.0 g/dL      BUN/Creatinine Ratio 15.2     Anion Gap 12.0 mmol/L      eGFR 89.2 mL/min/1.73     Narrative:      GFR Normal >60  Chronic Kidney Disease <60  Kidney Failure <15    The GFR formula is only valid for adults with stable renal function between ages 18 and 70.    BNP [[id number]]  (Normal) Collected: 08/18/23 1450     "Specimen: Blood from Arm, Left Updated: 08/18/23 1515     proBNP 1,137.0 pg/mL     Narrative:      Among patients with dyspnea, NT-proBNP is highly sensitive for the detection of acute congestive heart failure. In addition NT-proBNP of <300 pg/ml effectively rules out acute congestive heart failure with 99% negative predictive value.      High Sensitivity Troponin T [[id number]]  (Abnormal) Collected: 08/18/23 1450    Specimen: Blood from Arm, Left Updated: 08/18/23 1515     HS Troponin T 16 ng/L     Narrative:      High Sensitive Troponin T Reference Range:  <10.0 ng/L- Negative Female for AMI  <15.0 ng/L- Negative Male for AMI  >=10 - Abnormal Female indicating possible myocardial injury.  >=15 - Abnormal Male indicating possible myocardial injury.   Clinicians would have to utilize clinical acumen, EKG, Troponin, and serial changes to determine if it is an Acute Myocardial Infarction or myocardial injury due to an underlying chronic condition.         D-dimer, Quantitative [[id number]]  (Abnormal) Collected: 08/18/23 1325    Specimen: Blood from Arm, Right Updated: 08/18/23 1356     D-Dimer, Quantitative 6.82 mg/L (FEU)     Narrative:      According to the assay 's published package insert, a normal (<0.50 mg/L (FEU)) D-dimer result in conjunction with a non-high clinical probability assessment, excludes deep vein thrombosis (DVT) and pulmonary embolism (PE) with high sensitivity.    D-dimer values increase with age and this can make VTE exclusion of an older population difficult. To address this, the [employer], based on best available evidence and recent guidelines, recommends that clinicians use age-adjusted D-dimer thresholds in patients greater than 50 years of age with: a) a low probability of PE who do not meet all Pulmonary Embolism Rule Out Criteria, or b) in those with intermediate probability of PE.   The formula for an age-adjusted D-dimer cut-off is \"age/100\".  For " example, a 60 year old patient would have an age-adjusted cut-off of 0.60 mg/L (FEU) and an 80 year old 0.80 mg/L (FEU).    CBC & Differential [[id number]]  (Abnormal) Collected: 08/18/23 1325    Specimen: Blood from Arm, Right Updated: 08/18/23 1336    Narrative:      The following orders were created for panel order CBC & Differential.  Procedure                               Abnormality         Status                     ---------                               -----------         ------                     CBC Auto Differential[[id number]]        Abnormal            Final result                 Please view results for these tests on the individual orders.    CBC Auto Differential [[id number]]  (Abnormal) Collected: 08/18/23 1325    Specimen: Blood from Arm, Right Updated: 08/18/23 1336     WBC 14.40 10*3/mm3      RBC 4.61 10*6/mm3      Hemoglobin 14.0 g/dL      Hematocrit 41.6 %      MCV 90.4 fL      MCH 30.5 pg      MCHC 33.7 g/dL      RDW 14.5 %      RDW-SD 48.6 fl      MPV 8.2 fL      Platelets 567 10*3/mm3      Neutrophil % 79.5 %      Lymphocyte % 8.1 %      Monocyte % 9.3 %      Eosinophil % 2.6 %      Basophil % 0.5 %      Neutrophils, Absolute 11.40 10*3/mm3      Lymphocytes, Absolute 1.20 10*3/mm3      Monocytes, Absolute 1.30 10*3/mm3      Eosinophils, Absolute 0.40 10*3/mm3      Basophils, Absolute 0.10 10*3/mm3      nRBC 0.1 /100 WBC              Assessment & Plan       Pleural effusion, right    Congenital spondylolysis, lumbosacral region    Essential hypertension    Hyperlipidemia    Non-small cell lung cancer    S/P partial lobectomy of lung    Dyspnea on exertion    S/P thoracentesis    History of cigarette smoking    Peripheral vascular disease       Assessment & Plan  Mr. De La Torre is a pleasant 82-year-old gentleman who at the end of July 2023 underwent a right upper lobectomy for early stage non-small cell lung cancer.  He was seen in clinic yesterday with an enlarging right-sided pleural  effusion.  He is now status post thoracentesis, they drained approximately 900 cc.  His chest x-ray is stable this morning.  He denies any shortness of breath.  I am okay for him to DC home if appropriate from hospitalist standpoint.  I encouraged him to continue taking his 1200 twice a day guaifenesin which was prescribed to him last week in clinic.  I am also okay for him to restart taking his Plavix due to his peripheral vascular disease.  He is already scheduled to follow-up in my office in 2 months time for a scheduled postoperative screening CT scan.  Zuhair White MD PhD  [facility]  08/19/23  12:38 EDT    Greater than 30 minutes was spent reviewing the patient's chart, radiographic imaging, labs, provider notes, assessing the patient and developing a plan of care.  This was discussed with the patient and RN.

## 2023-08-19 NOTE — PLAN OF CARE
Problem: Adult Inpatient Plan of Care  Goal: Plan of Care Review  8/19/2023 0438 by Elizabeth Aguilar RN  Outcome: Ongoing, Progressing  8/18/2023 2204 by Elizabeth Aguilar RN  Outcome: Ongoing, Progressing  8/18/2023 2203 by Elizabeth Aguilar RN  Outcome: Ongoing, Progressing  Goal: Patient-Specific Goal (Individualized)  8/19/2023 0438 by Elizabeth Aguilar RN  Outcome: Ongoing, Progressing  8/18/2023 2204 by Elizabeth Aguilar RN  Outcome: Ongoing, Progressing  8/18/2023 2203 by Elizabeth Aguilar RN  Outcome: Ongoing, Progressing  Goal: Absence of Hospital-Acquired Illness or Injury  8/19/2023 0438 by Elizabeth Aguilar RN  Outcome: Ongoing, Progressing  8/18/2023 2204 by Elizabeth Aguilar RN  Outcome: Ongoing, Progressing  8/18/2023 2203 by Elizabeth Aguilar RN  Outcome: Ongoing, Progressing  Intervention: Identify and Manage Fall Risk  Recent Flowsheet Documentation  Taken 8/19/2023 0400 by Elizabeth Aguilar RN  Safety Promotion/Fall Prevention: assistive device/personal items within reach  Taken 8/19/2023 0000 by Elizabeth Aguilar RN  Safety Promotion/Fall Prevention: safety round/check completed  Taken 8/18/2023 2200 by Elizabeth Aguilar RN  Safety Promotion/Fall Prevention: safety round/check completed  Goal: Optimal Comfort and Wellbeing  8/19/2023 0438 by Elizabeth Aguilar RN  Outcome: Ongoing, Progressing  8/18/2023 2204 by Elizabeth Aguilar RN  Outcome: Ongoing, Progressing  8/18/2023 2203 by Elizabeth Aguilar RN  Outcome: Ongoing, Progressing  Goal: Readiness for Transition of Care  8/19/2023 0438 by Elziabeth Aguilar RN  Outcome: Ongoing, Progressing  8/18/2023 2204 by Elizabeth Aguilar RN  Outcome: Ongoing, Progressing  8/18/2023 2203 by Daniel-Golden, Elizabeth, RN  Outcome: Ongoing, Progressing     Problem: Fall Injury Risk  Goal: Absence of Fall and Fall-Related Injury  8/19/2023 0438 by Elizabeth Aguilar, RN  Outcome: Ongoing, Progressing  8/18/2023 2204 by  Elizabeth Aguilar RN  Outcome: Ongoing, Progressing  8/18/2023 2203 by Elizabeth Aguilar RN  Outcome: Ongoing, Progressing  Intervention: Identify and Manage Contributors  Recent Flowsheet Documentation  Taken 8/19/2023 0400 by Elizabeth Aguilar RN  Medication Review/Management: medications reviewed  Intervention: Promote Injury-Free Environment  Recent Flowsheet Documentation  Taken 8/19/2023 0400 by Elizabeth Aguilar RN  Safety Promotion/Fall Prevention: assistive device/personal items within reach  Taken 8/19/2023 0000 by Elizabeth Aguilar RN  Safety Promotion/Fall Prevention: safety round/check completed  Taken 8/18/2023 2200 by Elizabeth Aguilar RN  Safety Promotion/Fall Prevention: safety round/check completed     Problem: Adjustment to Illness (Sepsis/Septic Shock)  Goal: Optimal Coping  8/19/2023 0438 by Elizabeth Aguilar RN  Outcome: Ongoing, Progressing  8/18/2023 2204 by Elizabeth Aguilar RN  Outcome: Ongoing, Progressing  8/18/2023 2203 by Elizabeth Aguilar RN  Outcome: Ongoing, Progressing     Problem: Bleeding (Sepsis/Septic Shock)  Goal: Absence of Bleeding  8/19/2023 0438 by Elizabeth Aguilar RN  Outcome: Ongoing, Progressing  8/18/2023 2204 by Elizabeth Aguilar RN  Outcome: Ongoing, Progressing  8/18/2023 2203 by Elizabeth Aguilar RN  Outcome: Ongoing, Progressing     Problem: Glycemic Control Impaired (Sepsis/Septic Shock)  Goal: Blood Glucose Level Within Desired Range  8/19/2023 0438 by Elizabeth Aguilar RN  Outcome: Ongoing, Progressing  8/18/2023 2204 by Elizabeth Aguilar RN  Outcome: Ongoing, Progressing  8/18/2023 2203 by Elizabeth Aguilar RN  Outcome: Ongoing, Progressing     Problem: Infection Progression (Sepsis/Septic Shock)  Goal: Absence of Infection Signs and Symptoms  8/19/2023 0438 by Elizabeth Aguilar RN  Outcome: Ongoing, Progressing  8/18/2023 2204 by Daniel-Golden, Elizabeth, RN  Outcome: Ongoing, Progressing  8/18/2023 2203 by Elizabeth Aguilar,  RN  Outcome: Ongoing, Progressing     Problem: Nutrition Impaired (Sepsis/Septic Shock)  Goal: Optimal Nutrition Intake  8/19/2023 0438 by Elizabeth Aguilar RN  Outcome: Ongoing, Progressing  8/18/2023 2204 by Elizabeth Aguilar RN  Outcome: Ongoing, Progressing  8/18/2023 2203 by Elizabeth Aguilar RN  Outcome: Ongoing, Progressing     Problem: Breathing Pattern Ineffective  Goal: Effective Breathing Pattern  8/19/2023 0438 by Elizabeth Aguilar RN  Outcome: Ongoing, Progressing  8/18/2023 2204 by Elizabeth Aguilar RN  Outcome: Ongoing, Progressing  8/18/2023 2203 by Elizabeth Aguilar RN  Outcome: Ongoing, Progressing   Goal Outcome Evaluation:         New admit from ED With Hx lung ca and pleural effusion.  Patient is s/p lobectomy on 7/31/23.  Patient reports that he lives alone

## 2023-08-19 NOTE — DISCHARGE SUMMARY
[facility] Medicine Services  Discharge Summary    Date of Service: 2023  Patient Name: Sohan De La Torre  : 1940  MRN: [id number]    Date of Admission: 2023  Discharge Diagnosis: See below  Date of Discharge: 2023  Primary Care Physician: Tee Nicole MD    Presenting Problem:   Pleural effusion [J90]  Pleural effusion, right [J90]  Dyspnea, unspecified type [R06.00]    Active and Resolved Hospital Problems:  Active Hospital Problems    Diagnosis POA    **Pleural effusion, right [J90] Yes    S/P partial lobectomy of lung [Z90.2] Not Applicable    Dyspnea on exertion [R06.09] Yes    S/P thoracentesis [Z98.890] Not Applicable    History of cigarette smoking [Z87.891] Not Applicable    Peripheral vascular disease [I73.9] Yes    Non-small cell lung cancer [C34.90] Yes    Essential hypertension [I10] Yes    Hyperlipidemia [E78.5] Yes    Congenital spondylolysis, 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     Hospital Course     HPI:  Sohan De La Torre is a 82 y.o. male with hypertension, dyslipidemia, depression anxiety, peripheral vascular disease status post bypass surgery on left lower extremity 1 year ago and recently diagnosed right-sided lung cancer with lobectomy on 2023 who presented to [facility] on 2023 complaining of increasing shortness of breath over the past 2 to 3 days.  He says that he has been doing fine since surgery and has stitches and drain were recently removed but about 3 days ago he started getting short of breath which has been increasing and now he is so short of breath that he is unable to walk a few steps without huffing and puffing.  He came to the ER and on the chest x-ray was found to have a large right pleural effusion.  He has some cough going on with pleuritic chest pain on the right side but denies any fever chills abdominal pain nausea  vomiting diarrhea constipation hematemesis hematochezia melena hemoptysis dysuria or hematuria.  He has significant anorexia though and has not been able to eat anything for the past few days but he has been drinking a lot of fluids.    Hospital course:  He was admitted in consultation with cardiothoracic surgery for further evaluation and treatment.  He underwent IR thoracentesis with extraction of 900 cc of clear fluid.  No acute concerns on initial fluid studies, pending culture results.  He is breathing easier.  He was evaluated by cardiothoracic surgery with no further recommendations aside from outpatient follow-up as already arranged.  He will discharge home today.  For recurrent pleural effusion, may consider follow-up echocardiogram for further evaluation in the outpatient setting, as his last echocardiogram was in February 2022 with findings as described below.    Day of Discharge     Vital Signs:  Temp:  [97.5 øF (36.4 øC)-98.7 øF (37.1 øC)] 97.5 øF (36.4 øC)  Heart Rate:  [] 83  Resp:  [16-26] 22  BP: (117-161)/(65-99) 134/65  Flow (L/min):  Room air    Physical Exam:  GEN: WDWN, no acute distress  HEENT: NCAT, PERRLA, moist mucous membranes  NECK: Supple, midline trachea  CARDIAC: RRR, no appreciable M/R/G, no peripheral edema  PULM: Fairly CTAB, diminished on the right lower lung fields, nondistressed  ABD: soft, NTND, normoactive bowel sounds throughout  SKIN: Warm, dry  NEURO: Grossly intact, nonfocal exam  PSYCH: Pleasant    Pertinent  and/or Most Recent Results     LAB RESULTS:      Lab 08/18/23 2231 08/18/23  1325   WBC 13.20* 14.40*   HEMOGLOBIN 13.3 14.0   HEMATOCRIT 40.2 41.6   PLATELETS 482* 567*   NEUTROS ABS 9.50* 11.40*   LYMPHS ABS 1.20 1.20   MONOS ABS 1.90* 1.30*   EOS ABS 0.40 0.40   MCV 91.7 90.4   PROTIME  --  10.9   APTT  --  34.7*         Lab 08/18/23 2231 08/18/23  1450   SODIUM 139 139   POTASSIUM 3.8 4.0   CHLORIDE 102 102   CO2 23.0 25.0   ANION GAP 14.0 12.0   BUN 15 12    CREATININE 0.88 0.79   EGFR 86.4 89.2   GLUCOSE 116* 120*   CALCIUM 8.7 9.0         Lab 08/18/23  1450   TOTAL PROTEIN 7.2   ALBUMIN 3.6   GLOBULIN 3.6   ALT (SGPT) 33   AST (SGOT) 19   BILIRUBIN 0.6   ALK PHOS 135*         Lab 08/18/23  1949 08/18/23  1450 08/18/23  1325   PROBNP  --  1,137.0  --    HSTROP T 19* 16*  --    PROTIME  --   --  10.9   INR  --   --  1.02                 Brief Urine Lab Results       None          Microbiology Results (last 10 days)       Procedure Component Value - Date/Time    Body Fluid Culture - Body Fluid, Pleural Cavity [[id number]] Collected: 08/18/23 1727    Lab Status: Preliminary result Specimen: Body Fluid from Pleural Cavity Updated: 08/19/23 0657     Body Fluid Culture No growth at less than 24 hours     Gram Stain Rare (1+) WBCs seen      No organisms seen            XR Chest 2 View    Result Date: 8/18/2023  Impression: Impression: 1. Interval removal of the right chest tube. No visible pneumothorax. 2. Small to moderate right pleural effusion has developed or significantly increased since 8/11/2023. 3. Suspected passive right basilar atelectasis. Mild right midlung linear subsegmental atelectasis. Electronically Signed: Alexandra Romero MD  8/18/2023 12:42 PM EDT  Workstation ID: [id number]    XR Chest 2 View    Result Date: 8/11/2023  Impression: Impression: 1. Postoperative changes of the right hemithorax with right-sided chest tube in stable position. No definite pneumothorax. 2. Subcutaneous emphysema at the right lower neck and right chest wall decreased from prior study. Electronically Signed: Joe Monahan MD  8/11/2023 3:56 PM EDT  Workstation ID: [id number]    XR Chest 1 View    Result Date: 8/19/2023  Impression: Impression: No significant interval change. Electronically Signed: Giovanni Cooper MD  8/19/2023 8:23 AM EDT  Workstation ID: [id number]    XR Chest 1 View    Result Date: 8/18/2023  Impression: Impression: 1. Decrease in size of moderate right pleural effusion  status post thoracentesis with small right apical pneumothorax measuring 2 cm. 2. Persistent small right pleural effusion. 3. Right perihilar and right basilar airspace disease likely postoperative atelectasis and/or scarring. Electronically Signed: Joe Monahan MD  8/18/2023 4:12 PM EDT  Workstation ID: [id number]    XR Chest 1 View    Result Date: 8/5/2023  Impression: Slight decrease in size of small right apical pneumothorax. Stable right chest tube  This report was finalized on 8/5/2023 7:34 AM by Dr. Kip Li M.D.      XR Chest 1 View    Result Date: 8/4/2023  Impression: No change in the appearance of the right apical pneumothorax, right chest tube, or right body wall soft tissue gas.  This report was finalized on 8/4/2023 11:18 AM by Dr. Harlan Kimble M.D.      XR Chest 1 View    Result Date: 8/3/2023  Impression: 1. There still appears to be a small to moderate size right pneumothorax as described. 2. Overall the chest appears largely unchanged from yesterday's study.  This report was finalized on 8/3/2023 8:28 AM by Dr. Luiz Garza M.D.      XR Chest 1 View    Result Date: 8/2/2023  Impression: As described.  This report was finalized on 8/2/2023 2:41 PM by Dr. Fredo Weldon M.D.      XR Chest 1 View    Result Date: 8/1/2023  Impression: Increased right pneumothorax.  Discussed by telephone with patient's nurse, Claribel, at time of interpretation, 028, 08/01/2023.  This report was finalized on 8/1/2023 8:29 AM by Dr. Fredo Weldon M.D.      XR Chest 1 View    Result Date: 7/31/2023  Impression: Postsurgical changes with evidence of right apical pneumothorax, right chest tube in place.  This report was finalized on 7/31/2023 1:13 PM by Dr. Fredo Weldon M.D.      US Thoracentesis    Result Date: 8/18/2023  Impression: 1. Right thoracentesis yielding 900 mL of clear fluid Electronically Signed: Almas Chun MD  8/18/2023 3:54 PM EDT  Workstation ID: [id number]     Results for orders  placed during the hospital encounter of 02/09/22    Doppler arterial multi level lower extremity bilateral CAR    Interpretation Summary  ú Right Conclusion: The right MARILYN is moderately reduced. Waveforms are consistent with femoral disease. Moderate digital ischemia.  ú Left Conclusion: The left MARILYN is mildly reduced. Mild digital ischemia.  ú Marked improvement in left lower extremity perfusion when compared to previous exam.      Results for orders placed during the hospital encounter of 02/09/22    Doppler arterial multi level lower extremity bilateral CAR    Interpretation Summary  ú Right Conclusion: The right MARILYN is moderately reduced. Waveforms are consistent with femoral disease. Moderate digital ischemia.  ú Left Conclusion: The left MARILYN is mildly reduced. Mild digital ischemia.  ú Marked improvement in left lower extremity perfusion when compared to previous exam.      Results for orders placed during the hospital encounter of 02/03/22    Adult Transthoracic Echo Complete W/ Cont if Necessary Per Protocol    Interpretation Summary  ú Left ventricular systolic function is hyperdynamic (EF > 70%).  ú Left ventricular wall thickness is consistent with moderate concentric hypertrophy.  ú Left ventricular diastolic function is consistent with (grade I) impaired relaxation  ú Left ventricular outflow tract peak flow gradient at rest is 9 mmHg. Left ventricular outflow tract peak gradient with valsalva is 37 mmHg.  ú Normal right ventricular cavity size and systolic function noted.  ú The left atrial cavity is moderately dilated.  ú Saline test results are negative.  ú Moderate aortic valve stenosis is present. Peak velocity of the flow distal to the aortic valve is 297.4 cm/s. Aortic valve maximum pressure gradient is 35 mmHg. Aortic valve mean pressure gradient is 19 mmHg.  ú There is severe nodular calcification of the mitral annulus with extension onto the both mitral valve leaflets  ú Mild to moderate  mitral valve stenosis is present. The mitral valve mean gradient is 4 mmHg.  ú Mild tricuspid valve regurgitation is present.  ú Calculated right ventricular systolic pressure from tricuspid regurgitation is 27 mmHg.  ú There is no evidence of pericardial effusion      Labs Pending at Discharge:  Pending Labs       Order Current Status    Non-gynecologic Cytology Collected (08/18/23 1728)    Flow Cytometry In process    Body Fluid Culture - Body Fluid, Pleural Cavity Preliminary result            Procedures Performed  IR thoracentesis (Right side)    Consults:   Consults       Date and Time Order Name Status Description    8/18/2023  3:26 PM Inpatient Thoracic Surgery Consult      8/18/2023  2:41 PM Inpatient Thoracic Surgery Consult Completed     8/18/2023  2:27 PM Hospitalist (on-call MD unless specified)              Discharge Details        Discharge Medications        Continue These Medications        Instructions Start Date   ALPRAZolam 0.25 MG tablet  Commonly known as: XANAX   1 tablet, Oral, 3 Times Daily PRN      aspirin 81 MG EC tablet   81 mg, Oral, Nightly, INSTRUCTED TO CONT UNTIL DOS      atorvastatin 40 MG tablet  Commonly known as: LIPITOR   1 tablet, Oral, Nightly      dilTIAZem 30 MG tablet  Commonly known as: CARDIZEM   30 mg, Oral, Every 6 Hours Scheduled      guaiFENesin 600 MG 12 hr tablet  Commonly known as: Mucinex   1,200 mg, Oral, 2 Times Daily      hydroCHLOROthiazide 25 MG tablet  Commonly known as: HYDRODIURIL   1 tablet, Oral, Nightly      ibuprofen 600 MG tablet  Commonly known as: ADVIL,MOTRIN   1 tablet, Oral, Every 6 Hours PRN      oxyCODONE 5 MG immediate release tablet  Commonly known as: ROXICODONE   5 mg, Oral, Every 4 Hours PRN      venlafaxine XR 75 MG 24 hr capsule  Commonly known as: EFFEXOR-XR   2 capsules, Oral, Nightly, TAKES TWO PILLS EVERY NIGHT               No Known Allergies    Discharge Disposition:   Home or Self Care    Diet:  Hospital:  Diet Order   Procedures     Diet: Cardiac Diets; Healthy Heart (2-3 Na+); Texture: Regular Texture (IDDSI 7); Fluid Consistency: Thin (IDDSI 0)       Discharge Activity:   Activity Instructions       Activity as Tolerated              CODE STATUS:  Code Status and Medical Interventions:   Ordered at: 08/18/23 1511     Level Of Support Discussed With:    Patient     Code Status (Patient has no pulse and is not breathing):    No CPR (Do Not Attempt to Resuscitate)     Medical Interventions (Patient has pulse or is breathing):    Full Support       No future appointments.    Additional Instructions for the Follow-ups that You Need to Schedule       Call MD With Problems / Concerns   As directed      Instructions: PCM versus cardiothoracic surgery as appropriate    Order Comments: Instructions: PCM versus cardiothoracic surgery as appropriate         Discharge Follow-up with PCP   As directed       Currently Documented PCP:    Tee Nicole MD    PCP Phone Number:    [phone]     Follow Up Details: Within 1 week of discharge        Discharge Follow-up with Specialty: Cardiothoracic surgery as per their service   As directed      Specialty: Cardiothoracic surgery as per their service              Time spent on Discharge including face to face service: 25 minutes    Signature: Electronically signed by Avelino Conn MD, 08/19/23, 09:55 EDT.  [facility] Hospitalist Team

## 2023-08-19 NOTE — CASE MANAGEMENT/SOCIAL WORK
Continued Stay Note  [facility]     Patient Name: Sohan De La Torre  MRN: [id number]  Today's Date: 8/19/2023    Admit Date: 8/18/2023    Plan: Plan for home   Discharge Plan       Row Name 08/19/23 1349       Plan    Plan Plan for home    Plan Comments Pt in need of a ride home. CM contacted son and daughter; both are out of town. Spoke with friend Jose. Jose agreeable to take the pt home and will call NEEL when he arrives. RN updated via secure chat.

## 2023-08-20 NOTE — CASE MANAGEMENT/SOCIAL WORK
Case Management Discharge Note      Final Note: home     Transportation Services  Private: Car    Final Discharge Disposition Code: 01 - home or self-care

## 2023-08-21 LAB — REF LAB TEST METHOD: NORMAL

## 2023-08-23 ENCOUNTER — READMISSION MANAGEMENT (OUTPATIENT)
Dept: CALL CENTER | Facility: HOSPITAL | Age: 83
End: 2023-08-23
Payer: MEDICARE

## 2023-08-23 ENCOUNTER — TELEPHONE (OUTPATIENT)
Dept: SURGERY | Facility: CLINIC | Age: 83
End: 2023-08-23
Payer: MEDICARE

## 2023-08-23 DIAGNOSIS — C34.91 NON-SMALL CELL CANCER OF RIGHT LUNG: Primary | ICD-10-CM

## 2023-08-23 LAB
BACTERIA FLD CULT: NORMAL
GRAM STN SPEC: NORMAL
GRAM STN SPEC: NORMAL
LAB AP CASE REPORT: NORMAL
PATH REPORT.FINAL DX SPEC: NORMAL
PATH REPORT.GROSS SPEC: NORMAL

## 2023-08-23 NOTE — TELEPHONE ENCOUNTER
Call placed to patient to check on him from visit to the ED on 8/18/2023 and schedule him for follow up appt per Dr. White. Pt reports feeling about the same maybe a little better some SOA when walking. Advised that we do need to see him back in the office next Friday, 9/1/2023, with CXR prior to coming in to office. Patient agreeable with this and appt given. Advised to call us if he has any troubles or issues. Patient expressed understanding of all discussed.

## 2023-08-23 NOTE — OUTREACH NOTE
Medical Week 1 Survey      Flowsheet Row Responses   [facility] patient discharged from? [facility]   Does the patient have one of the following disease processes/diagnoses(primary or secondary)? Other   Week 1 attempt successful? Yes   Call start time 0909   Call end time 0911   Discharge diagnosis Pleural effusion, right   Meds reviewed with patient/caregiver? Yes   Is the patient having any side effects they believe may be caused by any medication additions or changes? No   Does the patient have all medications ordered at discharge? Yes   Is the patient taking all medications as directed (includes completed medication regime)? Yes   Does the patient have a primary care provider?  Yes   Does the patient have an appointment with their PCP within 7 days of discharge? No   What is preventing the patient from scheduling follow up appointments within 7 days of discharge? Haven't had time   Nursing Interventions Advised patient to make appointment   Has the patient kept scheduled appointments due by today? N/A   What is the [facility] health agency?  [facility]   Has home health visited the patient within 72 hours of discharge? Yes   Psychosocial issues? No   Did the patient receive a copy of their discharge instructions? Yes   Nursing interventions Reviewed instructions with patient   What is the patient's perception of their health status since discharge? Same   Week 1 call completed? Yes   Call end time 0911            Opal CENTENO - Registered Nurse

## 2023-08-28 ENCOUNTER — PATIENT OUTREACH (OUTPATIENT)
Dept: OTHER | Facility: HOSPITAL | Age: 83
End: 2023-08-28
Payer: MEDICARE

## 2023-08-28 DIAGNOSIS — I73.9 PERIPHERAL VASCULAR DISEASE: Primary | Chronic | ICD-10-CM

## 2023-08-28 RX ORDER — HYDROCODONE BITARTRATE AND ACETAMINOPHEN 5; 325 MG/1; MG/1
1 TABLET ORAL EVERY 4 HOURS PRN
Qty: 20 TABLET | Refills: 0 | Status: SHIPPED | OUTPATIENT
Start: 2023-08-28

## 2023-08-31 ENCOUNTER — READMISSION MANAGEMENT (OUTPATIENT)
Dept: CALL CENTER | Facility: HOSPITAL | Age: 83
End: 2023-08-31
Payer: MEDICARE

## 2023-08-31 NOTE — OUTREACH NOTE
Medical Week 2 Survey      Flowsheet Row Responses   [facility] patient discharged from? [facility]   Does the patient have one of the following disease processes/diagnoses(primary or secondary)? Other   Week 2 attempt successful? Yes   Call start time 1134   General alerts for this patient s/p partial lobectomy of lung.   Discharge diagnosis Pleural effusion, right   Call end time 1140   Meds reviewed with patient/caregiver? Yes   Is the patient having any side effects they believe may be caused by any medication additions or changes? No   Is the patient taking all medications as directed (includes completed medication regime)? Yes   Does the patient have an appointment with their PCP within 7 days of discharge? Greater than 7 days   Comments regarding PCP 9/1/23   Nursing Interventions Verified appointment date/time/provider   Has the patient kept scheduled appointments due by today? N/A   What is the Home health agency?  [facility]   Has home health visited the patient within 72 hours of discharge? No  [pt has been discharged]   Psychosocial issues? No   What is the patient's perception of their health status since discharge? Same  [sob with exertion, some constipation-taking stool softners]   Is the patient/caregiver able to teach back the hierarchy of who to call/visit for symptoms/problems? PCP, Specialist, Home health nurse, Urgent Care, ED, 911 Yes   Week 2 Call Completed? Yes   Is the patient interested in additional calls from an ambulatory ? No   Would this patient benefit from a Referral to Amb Social Work? No   Call end time 1140            Mabel CENTENO - Registered Nurse

## 2023-09-01 ENCOUNTER — HOSPITAL ENCOUNTER (OUTPATIENT)
Dept: GENERAL RADIOLOGY | Facility: HOSPITAL | Age: 83
Discharge: HOME | End: 2023-09-01
Admitting: STUDENT IN AN ORGANIZED HEALTH CARE EDUCATION/TRAINING PROGRAM
Payer: MEDICARE

## 2023-09-01 ENCOUNTER — OFFICE VISIT (OUTPATIENT)
Dept: SURGERY | Facility: CLINIC | Age: 83
End: 2023-09-01
Payer: MEDICARE

## 2023-09-01 VITALS
HEART RATE: 93 BPM | OXYGEN SATURATION: 96 % | SYSTOLIC BLOOD PRESSURE: 112 MMHG | HEIGHT: 70 IN | WEIGHT: 193 LBS | BODY MASS INDEX: 27.63 KG/M2 | DIASTOLIC BLOOD PRESSURE: 62 MMHG | TEMPERATURE: 99.1 F

## 2023-09-01 DIAGNOSIS — C34.91 NON-SMALL CELL CANCER OF RIGHT LUNG: ICD-10-CM

## 2023-09-01 DIAGNOSIS — C34.91 NON-SMALL CELL CANCER OF RIGHT LUNG: Primary | ICD-10-CM

## 2023-09-01 PROCEDURE — 99024 POSTOP FOLLOW-UP VISIT: CPT | Performed by: STUDENT IN AN ORGANIZED HEALTH CARE EDUCATION/TRAINING PROGRAM

## 2023-09-01 PROCEDURE — 71046 X-RAY EXAM CHEST 2 VIEWS: CPT

## 2023-09-01 PROCEDURE — 3074F SYST BP LT 130 MM HG: CPT | Performed by: STUDENT IN AN ORGANIZED HEALTH CARE EDUCATION/TRAINING PROGRAM

## 2023-09-01 PROCEDURE — 3078F DIAST BP <80 MM HG: CPT | Performed by: STUDENT IN AN ORGANIZED HEALTH CARE EDUCATION/TRAINING PROGRAM

## 2023-09-01 RX ORDER — GUAIFENESIN 600 MG/1
1200 TABLET, EXTENDED RELEASE ORAL 2 TIMES DAILY
Qty: 120 TABLET | Refills: 0 | Status: SHIPPED | OUTPATIENT
Start: 2023-09-01 | End: 2023-10-01

## 2023-09-01 RX ORDER — FUROSEMIDE 20 MG/1
20 TABLET ORAL DAILY
Qty: 30 TABLET | Refills: 0 | Status: SHIPPED | OUTPATIENT
Start: 2023-09-01 | End: 2023-09-01 | Stop reason: SDUPTHER

## 2023-09-01 RX ORDER — FUROSEMIDE 20 MG/1
20 TABLET ORAL DAILY
Qty: 14 TABLET | Refills: 0 | Status: SHIPPED | OUTPATIENT
Start: 2023-09-01 | End: 2023-09-15

## 2023-09-01 RX ORDER — FUROSEMIDE 20 MG/1
20 TABLET ORAL DAILY
Status: DISCONTINUED | OUTPATIENT
Start: 2023-09-01 | End: 2023-09-01

## 2023-09-01 NOTE — PROGRESS NOTES
"Chief Complaint  Lung Cancer (2 week follow up for lung cancer and CXR)    Subjective        Sohan De La Torre presents to [facility] THORACIC SURGERY  Lung Cancer    Mr. De La Torre is a very pleasant 82-year-old gentleman who presents 2 weeks after undergoing a right-sided thoracentesis to evacuate a postoperative effusion.  To reiterate, he underwent a robotic assisted right upper lobectomy on 7/31/2023.  His hospital course was prolonged by a intermittent air leak and ultimately he was discharged home with a chest tube in place.  This chest tube was removed on his first postoperative visit.  A week after he had presented with a right-sided effusion and symptomatic shortness of breath.  At that time, he was sent to the emergency department and he underwent a thoracentesis.  This helped him greatly with his symptoms.  He does state today he is slightly winded with exertion but this is much improved compared to 2 weeks ago.  He did have a mild to moderate effusion on chest x-ray today.  His lungs do sound much better on exam.  He denies any other complaints.  Objective   Vital Signs:  /62 (BP Location: Left arm, Patient Position: Sitting, Cuff Size: Adult)   Pulse 93   Temp 99.1 øF (37.3 øC) (Infrared)   Ht 177.8 cm (70\")   Wt 87.5 kg (193 lb)   SpO2 96%   BMI 27.69 kg/mý   Estimated body mass index is 27.69 kg/mý as calculated from the following:    Height as of this encounter: 177.8 cm (70\").    Weight as of this encounter: 87.5 kg (193 lb).         Physical Exam  Constitutional:       Appearance: Normal appearance.   Cardiovascular:      Rate and Rhythm: Normal rate and regular rhythm.      Pulses: Normal pulses.      Heart sounds: Normal heart sounds.   Pulmonary:      Effort: Pulmonary effort is normal.      Breath sounds: Normal breath sounds.   Skin:     Capillary Refill: Capillary refill takes less than 2 seconds.   Neurological:      General: No focal deficit present.      " Mental Status: He is alert and oriented to person, place, and time.   Psychiatric:         Mood and Affect: Mood normal.         Behavior: Behavior normal.         Thought Content: Thought content normal.         Judgment: Judgment normal.      Result Review :  Chest x-ray today was visualized by myself.  Does have a mild to moderate effusion on the right side.  This does appear to be similar to his chest x-ray status post thoracentesis.         Assessment and Plan   Diagnoses and all orders for this visit:    1. Non-small cell cancer of right lung (Primary)  -     Discontinue: furosemide (LASIX) tablet 20 mg    Other orders  -     Discontinue: furosemide (Lasix) 20 MG tablet; Take 1 tablet by mouth Daily for 30 days.  Dispense: 30 tablet; Refill: 0  -     furosemide (Lasix) 20 MG tablet; Take 1 tablet by mouth Daily for 14 days.  Dispense: 14 tablet; Refill: 0             Follow Up   No follow-ups on file.  Patient was given instructions and counseling regarding his condition or for health maintenance advice. Please see specific information pulled into the AVS if appropriate.

## 2023-09-06 ENCOUNTER — PATIENT OUTREACH (OUTPATIENT)
Dept: OTHER | Facility: HOSPITAL | Age: 83
End: 2023-09-06
Payer: MEDICARE

## 2023-09-06 NOTE — PROGRESS NOTES
Reviewed chart    Called patient. He is doing ok after surgery. He met with Dr. White at [facility] 9/1. The patient states his pain is better although he is still short of breath with activity, which is slowly improving. The patient denies any resource or supportive care needs.  His next f/u in at [facility] with Dr. White in November.    Sent message to [facility] navigator in [place] that I have been following since surgery was done in [place], although his F/u is in [place]. I will close his navigation case here although she may want to follow there.

## 2023-09-12 ENCOUNTER — READMISSION MANAGEMENT (OUTPATIENT)
Dept: CALL CENTER | Facility: HOSPITAL | Age: 83
End: 2023-09-12
Payer: MEDICARE

## 2023-09-12 RX ORDER — FUROSEMIDE 20 MG/1
20 TABLET ORAL DAILY
Qty: 14 TABLET | Refills: 0 | OUTPATIENT
Start: 2023-09-12 | End: 2023-09-26

## 2023-09-12 NOTE — TELEPHONE ENCOUNTER
Refill request for Furosemide 20mg. Last office visit 09/01/2023, next office visit 11/10/2023. Medication pended for your review. Thank you!

## 2023-09-12 NOTE — OUTREACH NOTE
Medical Week 3 Survey      Flowsheet Row Responses   [facility] patient discharged from? [facility]   Does the patient have one of the following disease processes/diagnoses(primary or secondary)? Other   Week 3 attempt successful? Yes   Call start time 1438   Call end time 1445   General alerts for this patient s/p partial lobectomy of lung.   Discharge diagnosis Pleural effusion, right   Medication alerts for this patient Pt reports that he is taking 3 diuretics, lasix and hydrochlorothiazide   Meds reviewed with patient/caregiver? Yes   Is the patient taking all medications as directed (includes completed medication regime)? Yes   Medication comments plans to call MD today r/t questions about Lasix   Has the patient kept scheduled appointments due by today? Yes   What is the Home health agency?  [facility]   Has home health visited the patient within 72 hours of discharge? N/A   Psychosocial issues? No   Comments Pt c/o continued SOA w/ walking up steps or longer than 25ft.Pt denies cough or phlegm.Pt c/o decreased appetite but he does eat daily.   What is the patient's perception of their health status since discharge? Same   Is the patient/caregiver able to teach back signs and symptoms related to disease process for when to call PCP? Yes   Additional teach back comments .   Week 3 Call Completed? Yes   Graduated Yes   Would this patient benefit from a Referral to [facility] Social Work? No   Wrap up additional comments Denies questions or needs at this time.   Call end time 1445            Luz Elena ZAMORANO - Registered Nurse

## 2023-10-04 DIAGNOSIS — R06.02 SHORTNESS OF BREATH: ICD-10-CM

## 2023-10-04 DIAGNOSIS — C34.91 NON-SMALL CELL CANCER OF RIGHT LUNG: Primary | ICD-10-CM

## 2023-10-05 ENCOUNTER — HOSPITAL ENCOUNTER (OUTPATIENT)
Dept: GENERAL RADIOLOGY | Facility: HOSPITAL | Age: 83
Discharge: HOME | End: 2023-10-05
Admitting: NURSE PRACTITIONER
Payer: MEDICARE

## 2023-10-05 DIAGNOSIS — C34.91 NON-SMALL CELL CANCER OF RIGHT LUNG: ICD-10-CM

## 2023-10-05 DIAGNOSIS — R06.02 SHORTNESS OF BREATH: ICD-10-CM

## 2023-10-05 PROCEDURE — 71046 X-RAY EXAM CHEST 2 VIEWS: CPT

## 2023-10-06 ENCOUNTER — OFFICE VISIT (OUTPATIENT)
Dept: SURGERY | Facility: CLINIC | Age: 83
End: 2023-10-06
Payer: MEDICARE

## 2023-10-06 VITALS
OXYGEN SATURATION: 98 % | HEIGHT: 70 IN | SYSTOLIC BLOOD PRESSURE: 152 MMHG | TEMPERATURE: 97.8 F | DIASTOLIC BLOOD PRESSURE: 107 MMHG | WEIGHT: 188.6 LBS | BODY MASS INDEX: 27 KG/M2 | HEART RATE: 94 BPM

## 2023-10-06 DIAGNOSIS — C34.91 NON-SMALL CELL CANCER OF RIGHT LUNG: Primary | ICD-10-CM

## 2023-10-06 DIAGNOSIS — R06.02 SHORTNESS OF BREATH: ICD-10-CM

## 2023-10-06 RX ORDER — TRAZODONE HYDROCHLORIDE 50 MG/1
50-100 TABLET ORAL DAILY
COMMUNITY
Start: 2023-09-26

## 2023-10-06 NOTE — PROGRESS NOTES
"Chief Complaint  Lung Cancer (Follow up SOA , CXR; reports breathing not getting any better)    Subjective        Sohan De La Torre presents to [facility] THORACIC SURGERY  Lung Cancer    Mr. De L aTorre is a very pleasant 82-year-old gentleman who presents today with shortness of breath.  He underwent a robotic assisted right upper lobectomy back on 7/31/2023.  This was complicated by a prolonged air leak.  Eventually his chest tube was removed approximately 1 week after the operation.  He then represented with a moderate-sized pleural effusion which was ultimately treated with a thoracentesis.  He has been doing well since that time.  Earlier this week he called our office and said he still becomes extremely winded with physical exertion.  He denies any shortness of breath whenever he is not physically active.  This is different from his prior shortness of breath episode where he was extremely winded at all times.  He denies any fevers, chills and/or cough.  He denies any other symptoms at this time.  He is on room air and is satting appropriately on room air.  Objective   Vital Signs:  BP (!) 152/107 (BP Location: Left arm, Patient Position: Sitting, Cuff Size: Adult)   Pulse 94   Temp 97.8 °F (36.6 °C) (Infrared)   Ht 177.8 cm (70\")   Wt 85.5 kg (188 lb 9.6 oz)   SpO2 98%   BMI 27.06 kg/m²   Estimated body mass index is 27.06 kg/m² as calculated from the following:    Height as of this encounter: 177.8 cm (70\").    Weight as of this encounter: 85.5 kg (188 lb 9.6 oz).         Physical Exam  Vitals reviewed.   Constitutional:       Appearance: Normal appearance.   Cardiovascular:      Rate and Rhythm: Normal rate and regular rhythm.      Pulses: Normal pulses.      Heart sounds: Normal heart sounds.   Pulmonary:      Effort: Pulmonary effort is normal.      Breath sounds: Normal breath sounds.   Skin:     Capillary Refill: Capillary refill takes less than 2 seconds.   Neurological:      " General: No focal deficit present.      Mental Status: He is alert and oriented to person, place, and time. Mental status is at baseline.   Psychiatric:         Mood and Affect: Mood normal.         Behavior: Behavior normal.         Thought Content: Thought content normal.         Judgment: Judgment normal.      Result Review :  Chest x-ray from yesterday 10/5/2023 was visualized by myself.  Still does have a right-sided effusion.  This appears actually improved from his previous hospital stay after his thoracentesis.           Assessment and Plan   Diagnoses and all orders for this visit:    1. 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 Shortness of breath    Mr. De La Torre is a pleasant 82-year-old gentleman who presents today in regards to shortness of breath with exertion.  He is status post right upper lobectomy and then a postoperative thoracentesis for a moderately large pleural effusion.  Based on his chest x-ray yesterday, I think his effusion is actually decreased in size from the time of his thoracentesis.  He is satting greater than 98% on room air and is not using accessory muscles on physical exam.  I have instructed him to continue taking his Lasix which we have prescribed in the hospital to help reduce the size of effusion.  We talked about undergoing a repeat thoracentesis and he does not wish to pursue that avenue at this time.  I am scheduled to see him in 1 month's time with a repeat CT scan of the chest for postoperative surveillance.  At that time, if he has a sizable effusion, we will readdress a possible thoracentesis.  He has also been instructed if his shortness of breath continues to persist, please let us know sooner than his 1 month follow-up and we will get a chest x-ray and undergo thoracentesis.  I will see him back in 1 month's time with his 3-month postoperative CT scan of the chest.       I spent 30 minutes caring for Sohan on this date of service. This time includes time  spent by me in the following activities:preparing for the visit, reviewing tests, obtaining and/or reviewing a separately obtained history, performing a medically appropriate examination and/or evaluation , counseling and educating the patient/family/caregiver, ordering medications, tests, or procedures, referring and communicating with other health care professionals , documenting information in the medical record, independently interpreting results and communicating that information with the patient/family/caregiver, and care coordination  Follow Up   No follow-ups on file.  Patient was given instructions and counseling regarding his condition or for health maintenance advice. Please see specific information pulled into the AVS if appropriate.

## 2023-10-09 DIAGNOSIS — R06.02 SHORTNESS OF BREATH: Primary | ICD-10-CM

## 2023-10-09 DIAGNOSIS — C34.91 NON-SMALL CELL CANCER OF RIGHT LUNG: ICD-10-CM

## 2023-10-09 RX ORDER — FUROSEMIDE 20 MG/1
20 TABLET ORAL DAILY
Qty: 30 TABLET | OUTPATIENT
Start: 2023-10-09

## 2023-10-09 RX ORDER — FUROSEMIDE 20 MG/1
20 TABLET ORAL DAILY
Qty: 30 TABLET | Refills: 0 | Status: SHIPPED | OUTPATIENT
Start: 2023-10-09 | End: 2023-11-08

## 2023-11-06 ENCOUNTER — HOSPITAL ENCOUNTER (OUTPATIENT)
Facility: HOSPITAL | Age: 83
Discharge: HOME | End: 2023-11-06
Admitting: STUDENT IN AN ORGANIZED HEALTH CARE EDUCATION/TRAINING PROGRAM
Payer: MEDICARE

## 2023-11-06 DIAGNOSIS — C34.91 NON-SMALL CELL CANCER OF RIGHT LUNG: ICD-10-CM

## 2023-11-06 PROCEDURE — 71250 CT THORAX DX C-: CPT

## 2023-11-08 ENCOUNTER — OFFICE VISIT (OUTPATIENT)
Dept: SURGERY | Facility: CLINIC | Age: 83
End: 2023-11-08
Payer: MEDICARE

## 2023-11-08 VITALS
SYSTOLIC BLOOD PRESSURE: 128 MMHG | WEIGHT: 185 LBS | BODY MASS INDEX: 26.48 KG/M2 | DIASTOLIC BLOOD PRESSURE: 86 MMHG | OXYGEN SATURATION: 97 % | HEART RATE: 94 BPM | HEIGHT: 70 IN

## 2023-11-08 DIAGNOSIS — C34.91 NON-SMALL CELL CANCER OF RIGHT LUNG: Primary | ICD-10-CM

## 2023-11-08 NOTE — PROGRESS NOTES
"Chief Complaint  Continued postoperative surveillance    Subjective        Sohan De La Torre presents to [facility] THORACIC SURGERY  History of Present Illness  Mr. De La Torre is a very pleasant 82-year-old gentleman who underwent a robotic assisted right upper lobectomy for non-small cell lung cancer on 7/31/2023.  He has had somewhat of a tumultuous postoperative course.  This was complicated by a postoperative effusion status post thoracentesis.  In addition he has had some on going dyspnea on exertion.  He states this is getting better but still somewhat lingers.  Overall he is doing well.  I have seen him multiple times in the postoperative period and today is the best that he is looked.  He presents for his 3-month postoperative surveillance CT scan.  Objective   Vital Signs:  /86 (BP Location: Left arm, Patient Position: Sitting, Cuff Size: Adult)   Pulse 94   Ht 177.8 cm (70\")   Wt 83.9 kg (185 lb)   SpO2 97%   BMI 26.54 kg/m²   Estimated body mass index is 26.54 kg/m² as calculated from the following:    Height as of this encounter: 177.8 cm (70\").    Weight as of this encounter: 83.9 kg (185 lb).           Physical Exam  Constitutional:       Appearance: Normal appearance.   Cardiovascular:      Rate and Rhythm: Normal rate and regular rhythm.      Pulses: Normal pulses.      Heart sounds: Normal heart sounds.   Pulmonary:      Effort: Pulmonary effort is normal.      Breath sounds: Wheezing present.   Musculoskeletal:         General: Normal range of motion.   Skin:     General: Skin is warm.      Capillary Refill: Capillary refill takes less than 2 seconds.      Comments: He has a mass on his right posterior back that is concerning.   Neurological:      General: No focal deficit present.      Mental Status: He is alert and oriented to person, place, and time.        Result Review :    CT scan of the chest performed on 11/6/2023 was visualized by myself.  No signs of " recurrence are noted.         Assessment and Plan   Diagnoses and all orders for this visit:    1. Non-small cell cancer of right lung (Primary)  -     Ambulatory Referral to Pulmonology  -     Ambulatory Referral to General Surgery  -     CT Chest Without Contrast Diagnostic; Future    Mr. 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  He appears to be improving overall.  He does complain of some continued dyspnea on exertion.  On his most recent CT scan of the chest I did not appreciate a drainable effusion on this right side.  I am going to have him evaluated by our pulmonology colleagues to see if they can help with his continued postoperative dyspnea on exertion.  In regards to continued surveillance, I will order a noncontrasted scan in 3 months time and see him back in clinic.  He has this mass that is located near one of the incisions on his right back.  This mass is concerning.  I am going to send him to one of our general surgery colleagues to see if surgical excision is warranted of this superficial mass.  We will see him back in clinic in 3 months with a noncontrasted CT scan of the chest.  We did discuss today pulmonary rehab and he is not interested in that at this time.       I spent 40 minutes caring for Sohan on this date of service. This time includes time spent by me in the following activities:preparing for the visit, reviewing tests, obtaining and/or reviewing a separately obtained history, performing a medically appropriate examination and/or evaluation , counseling and educating the patient/family/caregiver, ordering medications, tests, or procedures, referring and communicating with other health care professionals , documenting information in the medical record,  independently interpreting results and communicating that information with the patient/family/caregiver, and care coordination  Follow Up   No follow-ups on file.  Patient was given instructions and counseling regarding his condition or for health maintenance advice. Please see specific information pulled into the AVS if appropriate.

## 2023-11-29 ENCOUNTER — OFFICE VISIT (OUTPATIENT)
Dept: SURGERY | Facility: CLINIC | Age: 83
End: 2023-11-29
Payer: MEDICARE

## 2023-11-29 VITALS
DIASTOLIC BLOOD PRESSURE: 86 MMHG | HEART RATE: 101 BPM | BODY MASS INDEX: 27.06 KG/M2 | OXYGEN SATURATION: 96 % | WEIGHT: 189 LBS | HEIGHT: 70 IN | SYSTOLIC BLOOD PRESSURE: 140 MMHG | TEMPERATURE: 97.7 F

## 2023-11-29 DIAGNOSIS — L98.9 SKIN LESION OF BACK: Primary | ICD-10-CM

## 2023-11-29 PROCEDURE — 99203 OFFICE O/P NEW LOW 30 MIN: CPT | Performed by: SURGERY

## 2023-11-29 PROCEDURE — 3079F DIAST BP 80-89 MM HG: CPT | Performed by: SURGERY

## 2023-11-29 PROCEDURE — 1159F MED LIST DOCD IN RCRD: CPT | Performed by: SURGERY

## 2023-11-29 PROCEDURE — 1160F RVW MEDS BY RX/DR IN RCRD: CPT | Performed by: SURGERY

## 2023-11-29 PROCEDURE — 3077F SYST BP >= 140 MM HG: CPT | Performed by: SURGERY

## 2023-11-29 NOTE — H&P (VIEW-ONLY)
CHIEF COMPLAINT:    Skin lesion on right upper back    HISTORY OF PRESENT ILLNESS:    Sohan De La Torre is a 83 y.o. male who is referred by Dr. White who recently operated on the patient for right-sided lung cancer.  The patient was noted to have a pedunculated abnormal appearing skin lesion and is referred today for removal of the same.  It is unclear how long this lesion has been present but he believes it has been present at least for several years.  It occasionally has some bleeding.  Given its appearance he was referred for excision.    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     Cancer     Depression     Femoral thrombosis     Foot pain, left     R/T PVD    History of cigarette smoking 08/19/2023    Hyperlipidemia     Hypertension     Impaired proprioception     LEFT FOOT    Ischemia of left lower extremity     Low back pain     Lung mass     UPPER RIGHT LOBE    Peripheral vascular disease 08/19/2023    PONV (postoperative nausea and vomiting)     PVD (peripheral vascular disease)     S/P partial lobectomy of lung 08/19/2023    S/P thoracentesis 08/19/2023    Spondylosis     L5-S1       Past Surgical History:   Procedure Laterality Date    AMPUTATION DIGIT Left 02/11/2022    Procedure: LEFT THIRD TOE AMPUTATION;  Surgeon: Sohan Burger MD;  Location: [facility];  Service: Vascular;  Laterality: Left;    ANGIOPLASTY ILIAC ARTERY Left 08/28/2023    ATHRECTOMY ILIAC, FEMORAL, TIBIAL ARTERY Left 01/25/2022    Procedure: AORTO LIAC  FEMORAL LEFT POPLITEAL AND ANTERIOR TIBIAL LASER  ATHERECTOMY AND ANGIOPLASTY. LEFT FEMORAL AND POPLITEAL ARTERY STENT PLACEMENT;  Surgeon: Sohan Burger MD;  Location: [facility] OR 18/19;  Service: Vascular;  Laterality: Left;    ATHRECTOMY ILIAC, FEMORAL, TIBIAL ARTERY Left 06/28/2022     Procedure: AIF, Bilateral Lower Extremity Angiogram;  Surgeon: Sohan Burger MD;  Location: [facility] HYBRID OR 18/19;  Service: Vascular;  Laterality: Left;    ATHRECTOMY ILIAC, FEMORAL, TIBIAL ARTERY Left 11/04/2022    Procedure: AORTO ILIAC FEMORAL LEFT LEG DRUG COATED BALLOON ANGIOPLASTY, LASER ATHERECTOMY, COIL EMBOLIZATION;  Surgeon: Sohan Burger MD;  Location: [facility] OR 18/19;  Service: Vascular;  Laterality: Left;    BRONCHOSCOPY WITH ION ROBOTIC ASSIST N/A 07/13/2023    Procedure: BRONCHOSCOPY WITH ION ROBOT UNDER FLUOROSCOPY, ENDOBRONCHIAL ULTRASOUND, FINE NEEDLE ASPIRATION, BIOPSIES, AND LAVAGE;  Surgeon: Zuhair White MD PhD;  Location: [facility] ENDOSCOPY;  Service: Robotics - Pulmonary;  Laterality: N/A;  pre: lung mass  post: lung mass    CATARACT EXTRACTION, BILATERAL      ENDOSCOPY      FEMORAL TIBIAL BYPASS Left 07/29/2022    Procedure: LEFT FEM PERONEAL TIBIAL BYPASS WITH INSITU GREATER SAPHENPOUS VEIN;  Surgeon: Sohan Burger MD;  Location: [facility] OR;  Service: Vascular;  Laterality: Left;    LOBECTOMY Right 07/31/2023    Procedure: BRONCHOSCOPY, THORACOSCOPY WITH RIGHT UPPER LOBECTOMY WITH ArchyI ROBOT, MEDIASTINAL LYMPH NODE DISSECTION, INTERCOSTAL NERVE BLOCK;  Surgeon: Zuhair White MD PhD;  Location: [facility] OR;  Service: Robotics - DaVinci;  Laterality: Right;    TEMPORAL ARTERY BIOPSY Right 06/20/2023    Procedure: RIGHT TEMPORAL ARTERY BIOPSY;  Surgeon: Sohan Burger MD;  Location: [facility] MAIN OR;  Service: Vascular;  Laterality: Right;    WISDOM TOOTH EXTRACTION         Prior to Admission medications    Medication Sig Start Date End Date Taking? Authorizing Provider   ALPRAZolam (XANAX) 0.25 MG tablet Take 1 tablet by mouth 3 (Three) Times a Day As Needed. Indications: Feeling Anxious 7/21/23  Yes Provider, MD Dorene   aspirin 81 MG EC tablet Take 1 tablet by mouth Every Night. INSTRUCTED TO CONT UNTIL DOS 6/20/23  Yes Sohan Burger MD    atorvastatin (LIPITOR) 40 MG tablet Take 1 tablet by mouth Every Night. Indications: High Amount of Fats in the Blood 21  Yes Dorene Platt MD   hydroCHLOROthiazide (HYDRODIURIL) 25 MG tablet Take 2 tablets by mouth Daily. Indications: Edema 22  Yes Dorene Platt MD   HYDROcodone-acetaminophen (Norco) 5-325 MG per tablet Take 1 tablet by mouth Every 4 (Four) Hours As Needed for Severe Pain. 23  Yes Sohan Burger MD   ibuprofen (ADVIL,MOTRIN) 600 MG tablet Take 1 tablet by mouth Every 6 (Six) Hours As Needed. Indications: Pain   Yes Dorene Platt MD   oxyCODONE (ROXICODONE) 5 MG immediate release tablet Take 1 tablet by mouth Every 4 (Four) Hours As Needed for Moderate Pain.   Yes Dorene Platt MD   traZODone (DESYREL) 50 MG tablet Take 1-2 tablets by mouth Daily. 23  Yes Dorene Platt MD   venlafaxine XR (EFFEXOR-XR) 75 MG 24 hr capsule Take 2 capsules by mouth Every Night. TAKES TWO PILLS EVERY NIGHT  Indications: Major Depressive Disorder 23  Yes Dorene Platt MD   dilTIAZem (CARDIZEM) 30 MG tablet Take 1 tablet by mouth Every 6 (Six) Hours for 30 days. 23  Destiney Vyas, DNP, APRN   furosemide (Lasix) 20 MG tablet Take 1 tablet by mouth Daily for 14 days. 9/1/23 9/15/23  Zuhair White MD PhD   furosemide (Lasix) 20 MG tablet Take 1 tablet by mouth Daily for 30 days. 10/9/23 11/8/23  Zuhair White MD PhD   gemfibrozil (LOPID) 600 MG tablet Take 600 mg by mouth 2 (Two) Times a Day Before Meals.  8/3/22  Dorene Platt MD       No Known Allergies    Family History   Problem Relation Age of Onset    Malig Hyperthermia Neg Hx        Social History     Socioeconomic History    Marital status:    Tobacco Use    Smoking status: Former     Packs/day: 0.50     Years: 20.00     Additional pack years: 0.00     Total pack years: 10.00     Types: Cigarettes     Quit date:      Years since quittin.9      "Passive exposure: Past    Smokeless tobacco: Never    Tobacco comments:     25 YEARS AGO   Vaping Use    Vaping Use: Never used   Substance and Sexual Activity    Alcohol use: Yes     Alcohol/week: 3.0 standard drinks of alcohol     Types: 3 Glasses of wine per week     Comment: 2-3 TIMES WEEKLY    Drug use: Never    Sexual activity: Defer       Review of Systems   Skin:         Raised skin lesion on right back       Objective     /86 (BP Location: Left arm, Patient Position: Sitting, Cuff Size: Adult)   Pulse 101   Temp 97.7 °F (36.5 °C) (Infrared)   Ht 177.8 cm (70\")   Wt 85.7 kg (189 lb)   SpO2 96%   BMI 27.12 kg/m²     Physical Exam  Constitutional:       General: He is not in acute distress.     Appearance: Normal appearance. He is not ill-appearing, toxic-appearing or diaphoretic.   HENT:      Head: Normocephalic and atraumatic.   Eyes:      General:         Right eye: No discharge.         Left eye: No discharge.      Extraocular Movements: Extraocular movements intact.      Conjunctiva/sclera: Conjunctivae normal.   Pulmonary:      Effort: No respiratory distress.   Skin:     Coloration: Skin is not jaundiced.          Neurological:      General: No focal deficit present.      Mental Status: He is alert and oriented to person, place, and time.   Psychiatric:         Mood and Affect: Mood normal.         Behavior: Behavior normal.         Thought Content: Thought content normal.         Judgment: Judgment normal.           ASSESSMENT:    Malignant appearing skin lesion of right upper back    PLAN:    The patient has a pedunculated malignant appearing lesion of his upper back.  I discussed with him that it does appear to be malignant but is unlikely to be related to his recent lung cancer.  I discussed management options with him including continued observation, punch biopsy for diagnosis, excisional biopsy with margins with the understanding that if diagnosis of melanoma remade further " interventions would be warranted.  He would like to proceed with complete excision.  He is on aspirin and Plavix at home and will need to hold his Plavix for 5 days prior to the procedure.  The risks and benefits of excision of right back skin lesion were discussed and he has been scheduled.          This document has been electronically signed by Mason Baltazar MD on November 29, 2023 16:14 EST

## 2023-11-29 NOTE — PROGRESS NOTES
CHIEF COMPLAINT:    Skin lesion on right upper back    HISTORY OF PRESENT ILLNESS:    Sohan De La Torre is a 83 y.o. male who is referred by Dr. White who recently operated on the patient for right-sided lung cancer.  The patient was noted to have a pedunculated abnormal appearing skin lesion and is referred today for removal of the same.  It is unclear how long this lesion has been present but he believes it has been present at least for several years.  It occasionally has some bleeding.  Given its appearance he was referred for excision.    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     Cancer     Depression     Femoral thrombosis     Foot pain, left     R/T PVD    History of cigarette smoking 08/19/2023    Hyperlipidemia     Hypertension     Impaired proprioception     LEFT FOOT    Ischemia of left lower extremity     Low back pain     Lung mass     UPPER RIGHT LOBE    Peripheral vascular disease 08/19/2023    PONV (postoperative nausea and vomiting)     PVD (peripheral vascular disease)     S/P partial lobectomy of lung 08/19/2023    S/P thoracentesis 08/19/2023    Spondylosis     L5-S1       Past Surgical History:   Procedure Laterality Date    AMPUTATION DIGIT Left 02/11/2022    Procedure: LEFT THIRD TOE AMPUTATION;  Surgeon: Sohan Burger MD;  Location: [facility];  Service: Vascular;  Laterality: Left;    ANGIOPLASTY ILIAC ARTERY Left 08/28/2023    ATHRECTOMY ILIAC, FEMORAL, TIBIAL ARTERY Left 01/25/2022    Procedure: AORTO LIAC  FEMORAL LEFT POPLITEAL AND ANTERIOR TIBIAL LASER  ATHERECTOMY AND ANGIOPLASTY. LEFT FEMORAL AND POPLITEAL ARTERY STENT PLACEMENT;  Surgeon: Sohan Burger MD;  Location: [facility] OR 18/19;  Service: Vascular;  Laterality: Left;    ATHRECTOMY ILIAC, FEMORAL, TIBIAL ARTERY Left 06/28/2022     Procedure: AIF, Bilateral Lower Extremity Angiogram;  Surgeon: Sohan Burger MD;  Location: [facility] HYBRID OR 18/19;  Service: Vascular;  Laterality: Left;    ATHRECTOMY ILIAC, FEMORAL, TIBIAL ARTERY Left 11/04/2022    Procedure: AORTO ILIAC FEMORAL LEFT LEG DRUG COATED BALLOON ANGIOPLASTY, LASER ATHERECTOMY, COIL EMBOLIZATION;  Surgeon: Sohan Burger MD;  Location: [facility] OR 18/19;  Service: Vascular;  Laterality: Left;    BRONCHOSCOPY WITH ION ROBOTIC ASSIST N/A 07/13/2023    Procedure: BRONCHOSCOPY WITH ION ROBOT UNDER FLUOROSCOPY, ENDOBRONCHIAL ULTRASOUND, FINE NEEDLE ASPIRATION, BIOPSIES, AND LAVAGE;  Surgeon: Zuhair White MD PhD;  Location: [facility] ENDOSCOPY;  Service: Robotics - Pulmonary;  Laterality: N/A;  pre: lung mass  post: lung mass    CATARACT EXTRACTION, BILATERAL      ENDOSCOPY      FEMORAL TIBIAL BYPASS Left 07/29/2022    Procedure: LEFT FEM PERONEAL TIBIAL BYPASS WITH INSITU GREATER SAPHENPOUS VEIN;  Surgeon: Sohan Burger MD;  Location: [facility] OR;  Service: Vascular;  Laterality: Left;    LOBECTOMY Right 07/31/2023    Procedure: BRONCHOSCOPY, THORACOSCOPY WITH RIGHT UPPER LOBECTOMY WITH MusicIPI ROBOT, MEDIASTINAL LYMPH NODE DISSECTION, INTERCOSTAL NERVE BLOCK;  Surgeon: Zuhair White MD PhD;  Location: [facility] OR;  Service: Robotics - DaVinci;  Laterality: Right;    TEMPORAL ARTERY BIOPSY Right 06/20/2023    Procedure: RIGHT TEMPORAL ARTERY BIOPSY;  Surgeon: Sohan Burger MD;  Location: [facility] MAIN OR;  Service: Vascular;  Laterality: Right;    WISDOM TOOTH EXTRACTION         Prior to Admission medications    Medication Sig Start Date End Date Taking? Authorizing Provider   ALPRAZolam (XANAX) 0.25 MG tablet Take 1 tablet by mouth 3 (Three) Times a Day As Needed. Indications: Feeling Anxious 7/21/23  Yes Provider, MD Dorene   aspirin 81 MG EC tablet Take 1 tablet by mouth Every Night. INSTRUCTED TO CONT UNTIL DOS 6/20/23  Yes Sohan Burger MD    atorvastatin (LIPITOR) 40 MG tablet Take 1 tablet by mouth Every Night. Indications: High Amount of Fats in the Blood 21  Yes Dorene Platt MD   hydroCHLOROthiazide (HYDRODIURIL) 25 MG tablet Take 2 tablets by mouth Daily. Indications: Edema 22  Yes Dorene Platt MD   HYDROcodone-acetaminophen (Norco) 5-325 MG per tablet Take 1 tablet by mouth Every 4 (Four) Hours As Needed for Severe Pain. 23  Yes Sohan Burger MD   ibuprofen (ADVIL,MOTRIN) 600 MG tablet Take 1 tablet by mouth Every 6 (Six) Hours As Needed. Indications: Pain   Yes Dorene Platt MD   oxyCODONE (ROXICODONE) 5 MG immediate release tablet Take 1 tablet by mouth Every 4 (Four) Hours As Needed for Moderate Pain.   Yes Dorene Platt MD   traZODone (DESYREL) 50 MG tablet Take 1-2 tablets by mouth Daily. 23  Yes Dorene Platt MD   venlafaxine XR (EFFEXOR-XR) 75 MG 24 hr capsule Take 2 capsules by mouth Every Night. TAKES TWO PILLS EVERY NIGHT  Indications: Major Depressive Disorder 23  Yes Dorene Platt MD   dilTIAZem (CARDIZEM) 30 MG tablet Take 1 tablet by mouth Every 6 (Six) Hours for 30 days. 23  Destiney Vyas, DNP, APRN   furosemide (Lasix) 20 MG tablet Take 1 tablet by mouth Daily for 14 days. 9/1/23 9/15/23  Zuhair White MD PhD   furosemide (Lasix) 20 MG tablet Take 1 tablet by mouth Daily for 30 days. 10/9/23 11/8/23  Zuhair White MD PhD   gemfibrozil (LOPID) 600 MG tablet Take 600 mg by mouth 2 (Two) Times a Day Before Meals.  8/3/22  Dorene Platt MD       No Known Allergies    Family History   Problem Relation Age of Onset    Malig Hyperthermia Neg Hx        Social History     Socioeconomic History    Marital status:    Tobacco Use    Smoking status: Former     Packs/day: 0.50     Years: 20.00     Additional pack years: 0.00     Total pack years: 10.00     Types: Cigarettes     Quit date:      Years since quittin.9      "Passive exposure: Past    Smokeless tobacco: Never    Tobacco comments:     25 YEARS AGO   Vaping Use    Vaping Use: Never used   Substance and Sexual Activity    Alcohol use: Yes     Alcohol/week: 3.0 standard drinks of alcohol     Types: 3 Glasses of wine per week     Comment: 2-3 TIMES WEEKLY    Drug use: Never    Sexual activity: Defer       Review of Systems   Skin:         Raised skin lesion on right back       Objective     /86 (BP Location: Left arm, Patient Position: Sitting, Cuff Size: Adult)   Pulse 101   Temp 97.7 °F (36.5 °C) (Infrared)   Ht 177.8 cm (70\")   Wt 85.7 kg (189 lb)   SpO2 96%   BMI 27.12 kg/m²     Physical Exam  Constitutional:       General: He is not in acute distress.     Appearance: Normal appearance. He is not ill-appearing, toxic-appearing or diaphoretic.   HENT:      Head: Normocephalic and atraumatic.   Eyes:      General:         Right eye: No discharge.         Left eye: No discharge.      Extraocular Movements: Extraocular movements intact.      Conjunctiva/sclera: Conjunctivae normal.   Pulmonary:      Effort: No respiratory distress.   Skin:     Coloration: Skin is not jaundiced.          Neurological:      General: No focal deficit present.      Mental Status: He is alert and oriented to person, place, and time.   Psychiatric:         Mood and Affect: Mood normal.         Behavior: Behavior normal.         Thought Content: Thought content normal.         Judgment: Judgment normal.           ASSESSMENT:    Malignant appearing skin lesion of right upper back    PLAN:    The patient has a pedunculated malignant appearing lesion of his upper back.  I discussed with him that it does appear to be malignant but is unlikely to be related to his recent lung cancer.  I discussed management options with him including continued observation, punch biopsy for diagnosis, excisional biopsy with margins with the understanding that if diagnosis of melanoma remade further " interventions would be warranted.  He would like to proceed with complete excision.  He is on aspirin and Plavix at home and will need to hold his Plavix for 5 days prior to the procedure.  The risks and benefits of excision of right back skin lesion were discussed and he has been scheduled.          This document has been electronically signed by Mason Baltazar MD on November 29, 2023 16:14 EST

## 2023-12-07 PROBLEM — L98.9 SKIN LESION OF BACK: Status: ACTIVE | Noted: 2023-11-29

## 2023-12-11 ENCOUNTER — HOSPITAL ENCOUNTER (OUTPATIENT)
Dept: CARDIOLOGY | Facility: HOSPITAL | Age: 83
Discharge: HOME | End: 2023-12-11
Payer: MEDICARE

## 2023-12-11 ENCOUNTER — LAB (OUTPATIENT)
Dept: LAB | Facility: HOSPITAL | Age: 83
End: 2023-12-11
Payer: MEDICARE

## 2023-12-11 LAB
ANION GAP SERPL CALCULATED.3IONS-SCNC: 13.1 MMOL/L (ref 5–15)
BUN SERPL-MCNC: 18 MG/DL (ref 8–23)
BUN/CREAT SERPL: 17.6 (ref 7–25)
CALCIUM SPEC-SCNC: 9.7 MG/DL (ref 8.6–10.5)
CHLORIDE SERPL-SCNC: 100 MMOL/L (ref 98–107)
CO2 SERPL-SCNC: 27.9 MMOL/L (ref 22–29)
CREAT SERPL-MCNC: 1.02 MG/DL (ref 0.76–1.27)
EGFRCR SERPLBLD CKD-EPI 2021: 73.4 ML/MIN/1.73
GLUCOSE SERPL-MCNC: 169 MG/DL (ref 65–99)
POTASSIUM SERPL-SCNC: 3.5 MMOL/L (ref 3.5–5.2)
QT INTERVAL: 433 MS
QTC INTERVAL: 479 MS
SODIUM SERPL-SCNC: 141 MMOL/L (ref 136–145)

## 2023-12-11 PROCEDURE — 80048 BASIC METABOLIC PNL TOTAL CA: CPT | Performed by: SURGERY

## 2023-12-11 PROCEDURE — 93005 ELECTROCARDIOGRAM TRACING: CPT

## 2023-12-11 PROCEDURE — 36415 COLL VENOUS BLD VENIPUNCTURE: CPT | Performed by: SURGERY

## 2023-12-11 PROCEDURE — 93010 ELECTROCARDIOGRAM REPORT: CPT | Performed by: INTERNAL MEDICINE

## 2023-12-19 ENCOUNTER — ANESTHESIA (OUTPATIENT)
Dept: PERIOP | Facility: HOSPITAL | Age: 83
End: 2023-12-19
Payer: MEDICARE

## 2023-12-19 ENCOUNTER — HOSPITAL ENCOUNTER (OUTPATIENT)
Facility: HOSPITAL | Age: 83
Setting detail: HOSPITAL OUTPATIENT SURGERY
Discharge: HOME | End: 2023-12-19
Attending: SURGERY | Admitting: SURGERY
Payer: MEDICARE

## 2023-12-19 ENCOUNTER — ANESTHESIA EVENT (OUTPATIENT)
Dept: PERIOP | Facility: HOSPITAL | Age: 83
End: 2023-12-19
Payer: MEDICARE

## 2023-12-19 VITALS
SYSTOLIC BLOOD PRESSURE: 174 MMHG | TEMPERATURE: 97.7 F | OXYGEN SATURATION: 95 % | HEIGHT: 70 IN | RESPIRATION RATE: 18 BRPM | WEIGHT: 180 LBS | HEART RATE: 66 BPM | DIASTOLIC BLOOD PRESSURE: 76 MMHG | BODY MASS INDEX: 25.77 KG/M2

## 2023-12-19 DIAGNOSIS — L98.9 SKIN LESION OF BACK: ICD-10-CM

## 2023-12-19 PROCEDURE — 88305 TISSUE EXAM BY PATHOLOGIST: CPT | Performed by: SURGERY

## 2023-12-19 PROCEDURE — 25010000002 DEXAMETHASONE PER 1 MG

## 2023-12-19 PROCEDURE — 11606 EXC TR-EXT MAL+MARG >4 CM: CPT | Performed by: SURGERY

## 2023-12-19 PROCEDURE — 25010000002 PROPOFOL 200 MG/20ML EMULSION

## 2023-12-19 PROCEDURE — 25010000002 FENTANYL CITRATE (PF) 100 MCG/2ML SOLUTION

## 2023-12-19 PROCEDURE — 25010000002 CEFAZOLIN PER 500 MG: Performed by: SURGERY

## 2023-12-19 PROCEDURE — 25010000002 ONDANSETRON PER 1 MG

## 2023-12-19 PROCEDURE — 25810000003 LACTATED RINGERS PER 1000 ML: Performed by: ANESTHESIOLOGY

## 2023-12-19 PROCEDURE — 25010000002 MIDAZOLAM PER 1 MG

## 2023-12-19 PROCEDURE — 12034 INTMD RPR S/TR/EXT 7.6-12.5: CPT | Performed by: SURGERY

## 2023-12-19 RX ORDER — MIDAZOLAM HYDROCHLORIDE 1 MG/ML
INJECTION INTRAMUSCULAR; INTRAVENOUS AS NEEDED
Status: DISCONTINUED | OUTPATIENT
Start: 2023-12-19 | End: 2023-12-19 | Stop reason: SURG

## 2023-12-19 RX ORDER — FLUMAZENIL 0.1 MG/ML
0.1 INJECTION INTRAVENOUS AS NEEDED
Status: DISCONTINUED | OUTPATIENT
Start: 2023-12-19 | End: 2023-12-19 | Stop reason: HOSPADM

## 2023-12-19 RX ORDER — ONDANSETRON 2 MG/ML
4 INJECTION INTRAMUSCULAR; INTRAVENOUS ONCE AS NEEDED
Status: DISCONTINUED | OUTPATIENT
Start: 2023-12-19 | End: 2023-12-19 | Stop reason: HOSPADM

## 2023-12-19 RX ORDER — FENTANYL CITRATE 50 UG/ML
25 INJECTION, SOLUTION INTRAMUSCULAR; INTRAVENOUS
Status: DISCONTINUED | OUTPATIENT
Start: 2023-12-19 | End: 2023-12-19 | Stop reason: HOSPADM

## 2023-12-19 RX ORDER — SODIUM CHLORIDE, SODIUM LACTATE, POTASSIUM CHLORIDE, CALCIUM CHLORIDE 600; 310; 30; 20 MG/100ML; MG/100ML; MG/100ML; MG/100ML
1000 INJECTION, SOLUTION INTRAVENOUS CONTINUOUS
Status: DISCONTINUED | OUTPATIENT
Start: 2023-12-19 | End: 2023-12-19 | Stop reason: HOSPADM

## 2023-12-19 RX ORDER — ALBUTEROL SULFATE 2.5 MG/3ML
2.5 SOLUTION RESPIRATORY (INHALATION) ONCE AS NEEDED
Status: DISCONTINUED | OUTPATIENT
Start: 2023-12-19 | End: 2023-12-19 | Stop reason: HOSPADM

## 2023-12-19 RX ORDER — ACETAMINOPHEN 325 MG/1
650 TABLET ORAL ONCE AS NEEDED
Status: DISCONTINUED | OUTPATIENT
Start: 2023-12-19 | End: 2023-12-19 | Stop reason: HOSPADM

## 2023-12-19 RX ORDER — LIDOCAINE HYDROCHLORIDE 10 MG/ML
0.5 INJECTION, SOLUTION INFILTRATION; PERINEURAL ONCE AS NEEDED
Status: DISCONTINUED | OUTPATIENT
Start: 2023-12-19 | End: 2023-12-19 | Stop reason: HOSPADM

## 2023-12-19 RX ORDER — HYDRALAZINE HYDROCHLORIDE 20 MG/ML
5 INJECTION INTRAMUSCULAR; INTRAVENOUS
Status: DISCONTINUED | OUTPATIENT
Start: 2023-12-19 | End: 2023-12-19 | Stop reason: HOSPADM

## 2023-12-19 RX ORDER — ONDANSETRON 2 MG/ML
INJECTION INTRAMUSCULAR; INTRAVENOUS AS NEEDED
Status: DISCONTINUED | OUTPATIENT
Start: 2023-12-19 | End: 2023-12-19 | Stop reason: SURG

## 2023-12-19 RX ORDER — ACETAMINOPHEN 650 MG/1
325 SUPPOSITORY RECTAL EVERY 4 HOURS PRN
Status: DISCONTINUED | OUTPATIENT
Start: 2023-12-19 | End: 2023-12-19 | Stop reason: HOSPADM

## 2023-12-19 RX ORDER — LABETALOL HYDROCHLORIDE 5 MG/ML
5 INJECTION, SOLUTION INTRAVENOUS
Status: DISCONTINUED | OUTPATIENT
Start: 2023-12-19 | End: 2023-12-19 | Stop reason: HOSPADM

## 2023-12-19 RX ORDER — FENTANYL CITRATE 50 UG/ML
INJECTION, SOLUTION INTRAMUSCULAR; INTRAVENOUS AS NEEDED
Status: DISCONTINUED | OUTPATIENT
Start: 2023-12-19 | End: 2023-12-19 | Stop reason: SURG

## 2023-12-19 RX ORDER — PHENYLEPHRINE HCL IN 0.9% NACL 1 MG/10 ML
SYRINGE (ML) INTRAVENOUS AS NEEDED
Status: DISCONTINUED | OUTPATIENT
Start: 2023-12-19 | End: 2023-12-19 | Stop reason: SURG

## 2023-12-19 RX ORDER — DEXAMETHASONE SODIUM PHOSPHATE 4 MG/ML
INJECTION, SOLUTION INTRA-ARTICULAR; INTRALESIONAL; INTRAMUSCULAR; INTRAVENOUS; SOFT TISSUE AS NEEDED
Status: DISCONTINUED | OUTPATIENT
Start: 2023-12-19 | End: 2023-12-19 | Stop reason: SURG

## 2023-12-19 RX ORDER — PROPOFOL 10 MG/ML
INJECTION, EMULSION INTRAVENOUS AS NEEDED
Status: DISCONTINUED | OUTPATIENT
Start: 2023-12-19 | End: 2023-12-19 | Stop reason: SURG

## 2023-12-19 RX ORDER — SODIUM CHLORIDE 0.9 % (FLUSH) 0.9 %
10 SYRINGE (ML) INJECTION AS NEEDED
Status: DISCONTINUED | OUTPATIENT
Start: 2023-12-19 | End: 2023-12-19 | Stop reason: HOSPADM

## 2023-12-19 RX ORDER — PROCHLORPERAZINE EDISYLATE 5 MG/ML
10 INJECTION INTRAMUSCULAR; INTRAVENOUS ONCE AS NEEDED
Status: DISCONTINUED | OUTPATIENT
Start: 2023-12-19 | End: 2023-12-19 | Stop reason: HOSPADM

## 2023-12-19 RX ORDER — DIPHENHYDRAMINE HYDROCHLORIDE 50 MG/ML
12.5 INJECTION INTRAMUSCULAR; INTRAVENOUS
Status: DISCONTINUED | OUTPATIENT
Start: 2023-12-19 | End: 2023-12-19 | Stop reason: HOSPADM

## 2023-12-19 RX ORDER — LIDOCAINE HYDROCHLORIDE 20 MG/ML
INJECTION, SOLUTION EPIDURAL; INFILTRATION; INTRACAUDAL; PERINEURAL AS NEEDED
Status: DISCONTINUED | OUTPATIENT
Start: 2023-12-19 | End: 2023-12-19 | Stop reason: SURG

## 2023-12-19 RX ORDER — HYDROCODONE BITARTRATE AND ACETAMINOPHEN 5; 325 MG/1; MG/1
1 TABLET ORAL EVERY 6 HOURS PRN
Qty: 12 TABLET | Refills: 0 | Status: SHIPPED | OUTPATIENT
Start: 2023-12-19

## 2023-12-19 RX ORDER — HYDROCODONE BITARTRATE AND ACETAMINOPHEN 7.5; 325 MG/1; MG/1
1 TABLET ORAL EVERY 4 HOURS PRN
Status: DISCONTINUED | OUTPATIENT
Start: 2023-12-19 | End: 2023-12-19 | Stop reason: HOSPADM

## 2023-12-19 RX ORDER — FENTANYL CITRATE 50 UG/ML
50 INJECTION, SOLUTION INTRAMUSCULAR; INTRAVENOUS
Status: DISCONTINUED | OUTPATIENT
Start: 2023-12-19 | End: 2023-12-19 | Stop reason: HOSPADM

## 2023-12-19 RX ORDER — NALOXONE HCL 0.4 MG/ML
0.4 VIAL (ML) INJECTION AS NEEDED
Status: DISCONTINUED | OUTPATIENT
Start: 2023-12-19 | End: 2023-12-19 | Stop reason: HOSPADM

## 2023-12-19 RX ADMIN — MIDAZOLAM 1 MG: 1 INJECTION INTRAMUSCULAR; INTRAVENOUS at 15:27

## 2023-12-19 RX ADMIN — FENTANYL CITRATE 25 MCG: 50 INJECTION, SOLUTION INTRAMUSCULAR; INTRAVENOUS at 16:05

## 2023-12-19 RX ADMIN — FENTANYL CITRATE 75 MCG: 50 INJECTION, SOLUTION INTRAMUSCULAR; INTRAVENOUS at 15:37

## 2023-12-19 RX ADMIN — Medication 200 MCG: at 15:53

## 2023-12-19 RX ADMIN — ONDANSETRON 4 MG: 2 INJECTION INTRAMUSCULAR; INTRAVENOUS at 16:09

## 2023-12-19 RX ADMIN — PROPOFOL 170 MG: 10 INJECTION, EMULSION INTRAVENOUS at 15:37

## 2023-12-19 RX ADMIN — LIDOCAINE HYDROCHLORIDE 80 MG: 20 INJECTION, SOLUTION EPIDURAL; INFILTRATION; INTRACAUDAL; PERINEURAL at 15:37

## 2023-12-19 RX ADMIN — Medication 200 MCG: at 16:06

## 2023-12-19 RX ADMIN — DEXAMETHASONE SODIUM PHOSPHATE 4 MG: 4 INJECTION, SOLUTION INTRAMUSCULAR; INTRAVENOUS at 15:57

## 2023-12-19 RX ADMIN — PROPOFOL 150 MCG/KG/MIN: 10 INJECTION, EMULSION INTRAVENOUS at 15:41

## 2023-12-19 RX ADMIN — Medication 200 MCG: at 16:17

## 2023-12-19 RX ADMIN — SODIUM CHLORIDE, POTASSIUM CHLORIDE, SODIUM LACTATE AND CALCIUM CHLORIDE 1000 ML: 600; 310; 30; 20 INJECTION, SOLUTION INTRAVENOUS at 12:10

## 2023-12-19 RX ADMIN — CEFAZOLIN 2 G: 2 INJECTION, POWDER, FOR SOLUTION INTRAMUSCULAR; INTRAVENOUS at 15:41

## 2023-12-19 NOTE — INTERVAL H&P NOTE
H&P reviewed. The patient was examined and there are no changes to the H&P.          This document has been electronically signed by Mason Baltazar MD on December 19, 2023 11:56 EST

## 2023-12-19 NOTE — ANESTHESIA PREPROCEDURE EVALUATION
Anesthesia Evaluation     Patient summary reviewed and Nursing notes reviewed   no history of anesthetic complications:   NPO Solid Status: > 8 hours  NPO Liquid Status: > 2 hours           Airway   Mallampati: I  TM distance: >3 FB  Neck ROM: full  No difficulty expected  Dental - normal exam     Pulmonary - normal exam   (+) pleural effusion, lung cancer,shortness of breath  Cardiovascular - normal exam  Exercise tolerance: good (4-7 METS)    ECG reviewed    (+) hypertension, valvular problems/murmurs AS and MR, PVD, hyperlipidemia    ROS comment: Neg stress and Normal EF on TTE 2022  Mild to Mod MR and mod AS    Neuro/Psych  (+) psychiatric history Depression  (-) numbness  GI/Hepatic/Renal/Endo - negative ROS     Musculoskeletal     Abdominal  - normal exam    Bowel sounds: normal.   Substance History - negative use     OB/GYN negative ob/gyn ROS         Other   arthritis,   history of cancer remission                    Anesthesia Plan    ASA 3     general     intravenous induction     Anesthetic plan, risks, benefits, and alternatives have been provided, discussed and informed consent has been obtained with: patient.  Pre-procedure education provided  Plan discussed with CRNA and CAA.        CODE STATUS:

## 2023-12-19 NOTE — ANESTHESIA PROCEDURE NOTES
Airway  Urgency: elective    Date/Time: 12/19/2023 3:40 PM  Airway not difficult    General Information and Staff    Patient location during procedure: OR  Anesthesiologist: Mason iRchter MD  CRNA/CAA: Alyse Gabriel CRNA    Indications and Patient Condition  Indications for airway management: airway protection    Preoxygenated: yes  MILS maintained throughout  Mask difficulty assessment: 0 - not attempted    Final Airway Details  Final airway type: supraglottic airway      Successful airway: I-gel  Size 4     Number of attempts at approach: 1  Assessment: lips, teeth, and gum same as pre-op and atraumatic intubation

## 2023-12-19 NOTE — OP NOTE
Operative Note    Sohan De La Torre  12/19/2023    Pre-op Diagnosis:   Skin lesion of back [L98.9]    Post-op Diagnosis:     Post-Op Diagnosis Codes:     * Skin lesion of back [L98.9]    Procedure/CPT® Codes:      Procedure(s):  Excision of malignant skin lesion from right midback 3.5 cm x 10cm including margins    Surgeon(s):  Mason Baltazar MD    Anesthesia: Monitored Anesthesia Care    Staff:   Circulator: Charline De La Torre RN  Scrub Person: Milagro Arreola  Assistant: Yudi Smith RN CSA    Estimated Blood Loss: minimal    Specimens:                ID Type Source Tests Collected by Time   A (Not marked as sent) : skin lession Tissue Back, Upper TISSUE PATHOLOGY EXAM Mason Baltazar MD 12/19/2023 1604         Drains:   [REMOVED] Chest Tube Right Pleural (Removed)       [REMOVED] Urethral Catheter Silicone 16 Fr. (Removed)       [REMOVED] Urethral Catheter Silicone 16 Fr. (Removed)       Findings: malignant appearing skin lesion on right midback    Complications: none     Indication: Malignant appearing skin lesion on right midback    Operative Note:    The patient was seen, marked, and consented preoperatively.  Following this he was brought to the operating room and placed in supine position on the OR table.  General anesthetic was administered and a laryngeal mask airway was placed and secured by the anesthesia service.  The patient was then positioned in left side down lateral position and appropriately padded and secured.  A briefing was performed.  The site that had been previously identified and marked on the patient's back was prepped and draped in normal sterile fashion.  The lesion itself was measured and measured 2.5 cm in diameter.  Half centimeter gross margin was marked around the lesion.  A fusiform incision was then outlined of 10 cm in length that encompassed the lesion with appropriate gross margins.  Local anesthetic was then injected throughout this area.   Full-thickness of skin was then excised along the previously marked incision lines using electrocautery.  The specimen was marked with a short stitch at the superior margin and a long stitch is lateral margin.  It was passed off the field.    Points of bleeding were controlled with electrocautery.  Skin flaps were raised superiorly and inferiorly for closure.  The area appeared to close without tension.  The incision was then closed in layers using 2-0 and 3-0 Vicryl followed by interrupted 3-0 nylon.  Bacitracin and a clean dressing were then applied.  The patient was then returned to neutral position and awakened without issue.    Assistant: Yudi Smith RN CSA was responsible for performing the following activities: Retraction, Suction, and Placing Dressing and their skilled assistance was necessary for the success of this case.          This document has been electronically signed by Mason Baltazar MD on December 19, 2023 16:37 OLGA Baltazar MD     Date: 12/19/2023  Time: 16:34 EST

## 2023-12-20 ENCOUNTER — TELEPHONE (OUTPATIENT)
Dept: SURGERY | Facility: CLINIC | Age: 83
End: 2023-12-20
Payer: MEDICARE

## 2023-12-20 NOTE — ANESTHESIA POSTPROCEDURE EVALUATION
Patient: Sohan De La Torre    Procedure Summary       Date: 12/19/23 Room / Location: [facility] OR 09 / [facility] MAIN OR    Anesthesia Start: 1526 Anesthesia Stop: 1630    Procedure: EXCISION LESION of right back (Right) Diagnosis:       Skin lesion of back      (Skin lesion of back [L98.9])    Surgeons: Mason Baltazar MD Provider: Mason Richter MD    Anesthesia Type: general ASA Status: 3            Anesthesia Type: general    Vitals  Vitals Value Taken Time   /67 12/19/23 1719   Temp 97.7 °F (36.5 °C) 12/19/23 1715   Pulse 67 12/19/23 1719   Resp 20 12/19/23 1715   SpO2 95 % 12/19/23 1719   Vitals shown include unfiled device data.        Post Anesthesia Care and Evaluation    Patient location during evaluation: PACU  Patient participation: complete - patient participated  Level of consciousness: awake  Pain score: 0  Pain management: adequate  Anesthetic complications: No anesthetic complications  PONV Status: none  Cardiovascular status: acceptable  Respiratory status: acceptable  Hydration status: acceptable

## 2023-12-20 NOTE — TELEPHONE ENCOUNTER
Called Sohan De La Torre to check PO status.  Sohan De La Torre reports doing well.  PO appt scheduled.

## 2023-12-21 LAB
LAB AP CASE REPORT: NORMAL
LAB AP CLINICAL INFORMATION: NORMAL
PATH REPORT.FINAL DX SPEC: NORMAL
PATH REPORT.GROSS SPEC: NORMAL

## 2024-01-01 ENCOUNTER — HOSPITAL ENCOUNTER (INPATIENT)
Facility: HOSPITAL | Age: 84
LOS: 2 days | Discharge: HOME HEALTH | End: 2024-01-25
Attending: SURGERY | Admitting: SURGERY
Payer: MEDICARE

## 2024-01-01 ENCOUNTER — APPOINTMENT (OUTPATIENT)
Dept: CARDIOLOGY | Facility: HOSPITAL | Age: 84
End: 2024-01-01
Payer: COMMERCIAL

## 2024-01-01 ENCOUNTER — ANESTHESIA (OUTPATIENT)
Dept: PERIOP | Facility: HOSPITAL | Age: 84
End: 2024-01-01
Payer: MEDICARE

## 2024-01-01 ENCOUNTER — HOSPITAL ENCOUNTER (INPATIENT)
Facility: HOSPITAL | Age: 84
LOS: 1 days | Discharge: HOSPICE | DRG: 951 | End: 2024-01-27
Attending: EMERGENCY MEDICINE | Admitting: INTERNAL MEDICINE
Payer: MEDICARE

## 2024-01-01 ENCOUNTER — READMISSION MANAGEMENT (OUTPATIENT)
Dept: CALL CENTER | Facility: HOSPITAL | Age: 84
End: 2024-01-01
Payer: MEDICARE

## 2024-01-01 ENCOUNTER — HOME HEALTH ADMISSION (OUTPATIENT)
Dept: HOME HEALTH SERVICES | Facility: HOME HEALTHCARE | Age: 84
End: 2024-01-01
Payer: MEDICARE

## 2024-01-01 ENCOUNTER — ANESTHESIA EVENT (OUTPATIENT)
Dept: PERIOP | Facility: HOSPITAL | Age: 84
End: 2024-01-01
Payer: MEDICARE

## 2024-01-01 ENCOUNTER — HOSPITAL ENCOUNTER (INPATIENT)
Facility: HOSPITAL | Age: 84
LOS: 1 days | End: 2024-01-28
Attending: INTERNAL MEDICINE | Admitting: INTERNAL MEDICINE
Payer: OTHER GOVERNMENT

## 2024-01-01 ENCOUNTER — APPOINTMENT (OUTPATIENT)
Dept: GENERAL RADIOLOGY | Facility: HOSPITAL | Age: 84
End: 2024-01-01
Payer: MEDICARE

## 2024-01-01 ENCOUNTER — APPOINTMENT (OUTPATIENT)
Dept: GENERAL RADIOLOGY | Facility: HOSPITAL | Age: 84
DRG: 951 | End: 2024-01-01
Payer: MEDICARE

## 2024-01-01 VITALS
OXYGEN SATURATION: 94 % | BODY MASS INDEX: 27.58 KG/M2 | SYSTOLIC BLOOD PRESSURE: 131 MMHG | HEIGHT: 70 IN | WEIGHT: 192.68 LBS | RESPIRATION RATE: 18 BRPM | TEMPERATURE: 98.1 F | HEART RATE: 90 BPM | DIASTOLIC BLOOD PRESSURE: 73 MMHG

## 2024-01-01 VITALS
HEIGHT: 70 IN | DIASTOLIC BLOOD PRESSURE: 68 MMHG | BODY MASS INDEX: 27.58 KG/M2 | RESPIRATION RATE: 26 BRPM | WEIGHT: 192.68 LBS | SYSTOLIC BLOOD PRESSURE: 118 MMHG | HEART RATE: 93 BPM | OXYGEN SATURATION: 98 % | TEMPERATURE: 97.5 F

## 2024-01-01 DIAGNOSIS — I95.9 HYPOTENSION, UNSPECIFIED HYPOTENSION TYPE: ICD-10-CM

## 2024-01-01 DIAGNOSIS — N17.9 ACUTE KIDNEY INJURY: ICD-10-CM

## 2024-01-01 DIAGNOSIS — J96.01 ACUTE RESPIRATORY FAILURE WITH HYPOXIA: Primary | ICD-10-CM

## 2024-01-01 DIAGNOSIS — E87.20 METABOLIC ACIDOSIS: ICD-10-CM

## 2024-01-01 DIAGNOSIS — I35.0 NONRHEUMATIC AORTIC VALVE STENOSIS: Primary | ICD-10-CM

## 2024-01-01 DIAGNOSIS — D64.9 SEVERE ANEMIA: ICD-10-CM

## 2024-01-01 DIAGNOSIS — K92.1 MELENA: ICD-10-CM

## 2024-01-01 LAB
ALBUMIN SERPL-MCNC: 2.8 G/DL (ref 3.5–5.2)
ALBUMIN SERPL-MCNC: 4.1 G/DL (ref 3.5–5.2)
ALBUMIN/GLOB SERPL: 2 G/DL
ALBUMIN/GLOB SERPL: 2.1 G/DL
ALP SERPL-CCNC: 52 U/L (ref 39–117)
ALP SERPL-CCNC: 93 U/L (ref 39–117)
ALT SERPL W P-5'-P-CCNC: 13 U/L (ref 1–41)
ALT SERPL W P-5'-P-CCNC: 17 U/L (ref 1–41)
ANION GAP SERPL CALCULATED.3IONS-SCNC: 11 MMOL/L (ref 5–15)
ANION GAP SERPL CALCULATED.3IONS-SCNC: 12 MMOL/L (ref 5–15)
ANION GAP SERPL CALCULATED.3IONS-SCNC: 12 MMOL/L (ref 5–15)
ANION GAP SERPL CALCULATED.3IONS-SCNC: 13.6 MMOL/L (ref 5–15)
ANION GAP SERPL CALCULATED.3IONS-SCNC: 27 MMOL/L (ref 5–15)
ANISOCYTOSIS BLD QL: ABNORMAL
APTT PPP: 32.6 SECONDS (ref 22.7–35.4)
APTT PPP: 34.3 SECONDS (ref 22.7–35.4)
ARTERIAL PATENCY WRIST A: POSITIVE
AST SERPL-CCNC: 10 U/L (ref 1–40)
AST SERPL-CCNC: 19 U/L (ref 1–40)
ATMOSPHERIC PRESS: ABNORMAL MM[HG]
B PARAPERT DNA SPEC QL NAA+PROBE: NOT DETECTED
B PERT DNA SPEC QL NAA+PROBE: NOT DETECTED
BACTERIA UR QL AUTO: ABNORMAL /HPF
BASE EXCESS BLDA CALC-SCNC: -21.3 MMOL/L (ref 0–3)
BASOPHILS # BLD AUTO: 0.01 10*3/MM3 (ref 0–0.2)
BASOPHILS # BLD AUTO: 0.02 10*3/MM3 (ref 0–0.2)
BASOPHILS # BLD AUTO: 0.02 10*3/MM3 (ref 0–0.2)
BASOPHILS NFR BLD AUTO: 0.1 % (ref 0–1.5)
BDY SITE: ABNORMAL
BILIRUB SERPL-MCNC: 0.2 MG/DL (ref 0–1.2)
BILIRUB SERPL-MCNC: 0.4 MG/DL (ref 0–1.2)
BILIRUB UR QL STRIP: NEGATIVE
BUN SERPL-MCNC: 33 MG/DL (ref 8–23)
BUN SERPL-MCNC: 36 MG/DL (ref 8–23)
BUN SERPL-MCNC: 39 MG/DL (ref 8–23)
BUN SERPL-MCNC: 46 MG/DL (ref 8–23)
BUN SERPL-MCNC: 66 MG/DL (ref 8–23)
BUN/CREAT SERPL: 28.4 (ref 7–25)
BUN/CREAT SERPL: 31.5 (ref 7–25)
BUN/CREAT SERPL: 31.6 (ref 7–25)
BUN/CREAT SERPL: 46.5 (ref 7–25)
BUN/CREAT SERPL: 47.8 (ref 7–25)
BURR CELLS BLD QL SMEAR: ABNORMAL
C PNEUM DNA NPH QL NAA+NON-PROBE: NOT DETECTED
CALCIUM SPEC-SCNC: 7.9 MG/DL (ref 8.6–10.5)
CALCIUM SPEC-SCNC: 8.2 MG/DL (ref 8.6–10.5)
CALCIUM SPEC-SCNC: 8.4 MG/DL (ref 8.6–10.5)
CALCIUM SPEC-SCNC: 8.7 MG/DL (ref 8.6–10.5)
CALCIUM SPEC-SCNC: 9 MG/DL (ref 8.6–10.5)
CHLORIDE SERPL-SCNC: 102 MMOL/L (ref 98–107)
CHLORIDE SERPL-SCNC: 104 MMOL/L (ref 98–107)
CHLORIDE SERPL-SCNC: 104 MMOL/L (ref 98–107)
CHLORIDE SERPL-SCNC: 105 MMOL/L (ref 98–107)
CHLORIDE SERPL-SCNC: 107 MMOL/L (ref 98–107)
CLARITY UR: CLEAR
CO2 BLDA-SCNC: 10.7 MMOL/L (ref 22–29)
CO2 SERPL-SCNC: 13 MMOL/L (ref 22–29)
CO2 SERPL-SCNC: 21.4 MMOL/L (ref 22–29)
CO2 SERPL-SCNC: 22 MMOL/L (ref 22–29)
CO2 SERPL-SCNC: 23 MMOL/L (ref 22–29)
CO2 SERPL-SCNC: 24 MMOL/L (ref 22–29)
COLOR UR: YELLOW
CREAT SERPL-MCNC: 0.99 MG/DL (ref 0.76–1.27)
CREAT SERPL-MCNC: 1.14 MG/DL (ref 0.76–1.27)
CREAT SERPL-MCNC: 1.16 MG/DL (ref 0.76–1.27)
CREAT SERPL-MCNC: 1.24 MG/DL (ref 0.76–1.27)
CREAT SERPL-MCNC: 1.38 MG/DL (ref 0.76–1.27)
D DIMER PPP FEU-MCNC: 3.35 MG/L (FEU) (ref 0–0.82)
DEPRECATED RDW RBC AUTO: 38.4 FL (ref 37–54)
DEPRECATED RDW RBC AUTO: 39.7 FL (ref 37–54)
DEPRECATED RDW RBC AUTO: 40.9 FL (ref 37–54)
DEPRECATED RDW RBC AUTO: 42.2 FL (ref 37–54)
DEPRECATED RDW RBC AUTO: 55.6 FL (ref 37–54)
EGFRCR SERPLBLD CKD-EPI 2021: 51.1 ML/MIN/1.73
EGFRCR SERPLBLD CKD-EPI 2021: 58 ML/MIN/1.73
EGFRCR SERPLBLD CKD-EPI 2021: 62.9 ML/MIN/1.73
EGFRCR SERPLBLD CKD-EPI 2021: 64.2 ML/MIN/1.73
EGFRCR SERPLBLD CKD-EPI 2021: 76.1 ML/MIN/1.73
EOSINOPHIL # BLD AUTO: 0 10*3/MM3 (ref 0–0.4)
EOSINOPHIL # BLD MANUAL: 0.55 10*3/MM3 (ref 0–0.4)
EOSINOPHIL NFR BLD AUTO: 0 % (ref 0.3–6.2)
EOSINOPHIL NFR BLD MANUAL: 2 % (ref 0.3–6.2)
ERYTHROCYTE [DISTWIDTH] IN BLOOD BY AUTOMATED COUNT: 12.8 % (ref 12.3–15.4)
ERYTHROCYTE [DISTWIDTH] IN BLOOD BY AUTOMATED COUNT: 12.9 % (ref 12.3–15.4)
ERYTHROCYTE [DISTWIDTH] IN BLOOD BY AUTOMATED COUNT: 13 % (ref 12.3–15.4)
ERYTHROCYTE [DISTWIDTH] IN BLOOD BY AUTOMATED COUNT: 13.2 % (ref 12.3–15.4)
ERYTHROCYTE [DISTWIDTH] IN BLOOD BY AUTOMATED COUNT: 15.3 % (ref 12.3–15.4)
FIBRINOGEN PPP-MCNC: 228 MG/DL (ref 219–464)
FIBRINOGEN PPP-MCNC: 284 MG/DL (ref 219–464)
FLUAV SUBTYP SPEC NAA+PROBE: NOT DETECTED
FLUBV RNA ISLT QL NAA+PROBE: NOT DETECTED
GLOBULIN UR ELPH-MCNC: 1.4 GM/DL
GLOBULIN UR ELPH-MCNC: 2 GM/DL
GLUCOSE BLDC GLUCOMTR-MCNC: 275 MG/DL (ref 70–105)
GLUCOSE SERPL-MCNC: 115 MG/DL (ref 65–99)
GLUCOSE SERPL-MCNC: 141 MG/DL (ref 65–99)
GLUCOSE SERPL-MCNC: 156 MG/DL (ref 65–99)
GLUCOSE SERPL-MCNC: 265 MG/DL (ref 65–99)
GLUCOSE SERPL-MCNC: 313 MG/DL (ref 65–99)
GLUCOSE UR STRIP-MCNC: NEGATIVE MG/DL
HADV DNA SPEC NAA+PROBE: NOT DETECTED
HCO3 BLDA-SCNC: 9.3 MMOL/L (ref 21–28)
HCOV 229E RNA SPEC QL NAA+PROBE: NOT DETECTED
HCOV HKU1 RNA SPEC QL NAA+PROBE: NOT DETECTED
HCOV NL63 RNA SPEC QL NAA+PROBE: NOT DETECTED
HCOV OC43 RNA SPEC QL NAA+PROBE: NOT DETECTED
HCT VFR BLD AUTO: 19.6 % (ref 37.5–51)
HCT VFR BLD AUTO: 28.4 % (ref 37.5–51)
HCT VFR BLD AUTO: 29.6 % (ref 37.5–51)
HCT VFR BLD AUTO: 32.6 % (ref 37.5–51)
HCT VFR BLD AUTO: 36.3 % (ref 37.5–51)
HCT VFR BLD AUTO: 40.5 % (ref 37.5–51)
HEMODILUTION: NO
HGB BLD-MCNC: 10 G/DL (ref 13–17.7)
HGB BLD-MCNC: 10.6 G/DL (ref 13–17.7)
HGB BLD-MCNC: 12.2 G/DL (ref 13–17.7)
HGB BLD-MCNC: 13.3 G/DL (ref 13–17.7)
HGB BLD-MCNC: 5.7 G/DL (ref 13–17.7)
HGB BLD-MCNC: 9.2 G/DL (ref 13–17.7)
HGB UR QL STRIP.AUTO: ABNORMAL
HMPV RNA NPH QL NAA+NON-PROBE: NOT DETECTED
HPIV1 RNA ISLT QL NAA+PROBE: NOT DETECTED
HPIV2 RNA SPEC QL NAA+PROBE: NOT DETECTED
HPIV3 RNA NPH QL NAA+PROBE: NOT DETECTED
HPIV4 P GENE NPH QL NAA+PROBE: NOT DETECTED
HYALINE CASTS UR QL AUTO: ABNORMAL /LPF
IMM GRANULOCYTES # BLD AUTO: 0.1 10*3/MM3 (ref 0–0.05)
IMM GRANULOCYTES # BLD AUTO: 0.11 10*3/MM3 (ref 0–0.05)
IMM GRANULOCYTES # BLD AUTO: 0.15 10*3/MM3 (ref 0–0.05)
IMM GRANULOCYTES NFR BLD AUTO: 0.6 % (ref 0–0.5)
IMM GRANULOCYTES NFR BLD AUTO: 0.7 % (ref 0–0.5)
IMM GRANULOCYTES NFR BLD AUTO: 0.7 % (ref 0–0.5)
INHALED O2 CONCENTRATION: 100 %
INR PPP: 1.03 (ref 0.9–1.1)
INR PPP: 1.08 (ref 0.9–1.1)
KETONES UR QL STRIP: NEGATIVE
LEUKOCYTE ESTERASE UR QL STRIP.AUTO: NEGATIVE
LYMPHOCYTES # BLD AUTO: 0.71 10*3/MM3 (ref 0.7–3.1)
LYMPHOCYTES # BLD AUTO: 0.75 10*3/MM3 (ref 0.7–3.1)
LYMPHOCYTES # BLD AUTO: 0.8 10*3/MM3 (ref 0.7–3.1)
LYMPHOCYTES # BLD MANUAL: 6.88 10*3/MM3 (ref 0.7–3.1)
LYMPHOCYTES NFR BLD AUTO: 3.9 % (ref 19.6–45.3)
LYMPHOCYTES NFR BLD AUTO: 4.4 % (ref 19.6–45.3)
LYMPHOCYTES NFR BLD AUTO: 4.8 % (ref 19.6–45.3)
LYMPHOCYTES NFR BLD MANUAL: 5 % (ref 5–12)
M PNEUMO IGG SER IA-ACNC: NOT DETECTED
MCH RBC QN AUTO: 27.6 PG (ref 26.6–33)
MCH RBC QN AUTO: 28.1 PG (ref 26.6–33)
MCH RBC QN AUTO: 28.6 PG (ref 26.6–33)
MCH RBC QN AUTO: 28.6 PG (ref 26.6–33)
MCH RBC QN AUTO: 29.1 PG (ref 26.6–33)
MCHC RBC AUTO-ENTMCNC: 28.8 G/DL (ref 31.5–35.7)
MCHC RBC AUTO-ENTMCNC: 32.4 G/DL (ref 31.5–35.7)
MCHC RBC AUTO-ENTMCNC: 32.5 G/DL (ref 31.5–35.7)
MCHC RBC AUTO-ENTMCNC: 32.8 G/DL (ref 31.5–35.7)
MCHC RBC AUTO-ENTMCNC: 33.6 G/DL (ref 31.5–35.7)
MCV RBC AUTO: 84.9 FL (ref 79–97)
MCV RBC AUTO: 86.6 FL (ref 79–97)
MCV RBC AUTO: 87.1 FL (ref 79–97)
MCV RBC AUTO: 88.2 FL (ref 79–97)
MCV RBC AUTO: 97.5 FL (ref 79–97)
MODALITY: ABNORMAL
MONOCYTES # BLD AUTO: 0.16 10*3/MM3 (ref 0.1–0.9)
MONOCYTES # BLD AUTO: 0.51 10*3/MM3 (ref 0.1–0.9)
MONOCYTES # BLD AUTO: 1.42 10*3/MM3 (ref 0.1–0.9)
MONOCYTES # BLD: 1.38 10*3/MM3 (ref 0.1–0.9)
MONOCYTES NFR BLD AUTO: 0.9 % (ref 5–12)
MONOCYTES NFR BLD AUTO: 3.4 % (ref 5–12)
MONOCYTES NFR BLD AUTO: 7 % (ref 5–12)
NEUTROPHILS # BLD AUTO: 18.7 10*3/MM3 (ref 1.7–7)
NEUTROPHILS NFR BLD AUTO: 13.57 10*3/MM3 (ref 1.7–7)
NEUTROPHILS NFR BLD AUTO: 16.02 10*3/MM3 (ref 1.7–7)
NEUTROPHILS NFR BLD AUTO: 18 10*3/MM3 (ref 1.7–7)
NEUTROPHILS NFR BLD AUTO: 88.3 % (ref 42.7–76)
NEUTROPHILS NFR BLD AUTO: 91 % (ref 42.7–76)
NEUTROPHILS NFR BLD AUTO: 94 % (ref 42.7–76)
NEUTROPHILS NFR BLD MANUAL: 65 % (ref 42.7–76)
NEUTS BAND NFR BLD MANUAL: 3 % (ref 0–5)
NITRITE UR QL STRIP: NEGATIVE
NRBC BLD AUTO-RTO: 0 /100 WBC (ref 0–0.2)
NT-PROBNP SERPL-MCNC: 1003 PG/ML (ref 0–1800)
NT-PROBNP SERPL-MCNC: 677 PG/ML (ref 0–1800)
PCO2 BLDA: 45.8 MM HG (ref 35–48)
PEEP RESPIRATORY: 5 CM[H2O]
PH BLDA: 6.91 PH UNITS (ref 7.35–7.45)
PH UR STRIP.AUTO: 6.5 [PH] (ref 5–8)
PLAT MORPH BLD: NORMAL
PLATELET # BLD AUTO: 182 10*3/MM3 (ref 140–450)
PLATELET # BLD AUTO: 217 10*3/MM3 (ref 140–450)
PLATELET # BLD AUTO: 241 10*3/MM3 (ref 140–450)
PLATELET # BLD AUTO: 265 10*3/MM3 (ref 140–450)
PLATELET # BLD AUTO: 325 10*3/MM3 (ref 140–450)
PMV BLD AUTO: 10.2 FL (ref 6–12)
PMV BLD AUTO: 11 FL (ref 6–12)
PMV BLD AUTO: 11.4 FL (ref 6–12)
PMV BLD AUTO: 11.6 FL (ref 6–12)
PMV BLD AUTO: 11.7 FL (ref 6–12)
PO2 BLDA: 443.8 MM HG (ref 83–108)
POIKILOCYTOSIS BLD QL SMEAR: ABNORMAL
POTASSIUM SERPL-SCNC: 3.7 MMOL/L (ref 3.5–5.2)
POTASSIUM SERPL-SCNC: 3.8 MMOL/L (ref 3.5–5.2)
POTASSIUM SERPL-SCNC: 3.9 MMOL/L (ref 3.5–5.2)
POTASSIUM SERPL-SCNC: 4 MMOL/L (ref 3.5–5.2)
POTASSIUM SERPL-SCNC: 4.2 MMOL/L (ref 3.5–5.2)
PROCALCITONIN SERPL-MCNC: 0.06 NG/ML (ref 0–0.25)
PROT SERPL-MCNC: 4.2 G/DL (ref 6–8.5)
PROT SERPL-MCNC: 6.1 G/DL (ref 6–8.5)
PROT UR QL STRIP: NEGATIVE
PROTHROMBIN TIME: 13.6 SECONDS (ref 11.7–14.2)
PROTHROMBIN TIME: 14.1 SECONDS (ref 11.7–14.2)
QT INTERVAL: 409 MS
QT INTERVAL: 498 MS
QTC INTERVAL: 481 MS
QTC INTERVAL: 521 MS
RBC # BLD AUTO: 2.01 10*6/MM3 (ref 4.14–5.8)
RBC # BLD AUTO: 3.22 10*6/MM3 (ref 4.14–5.8)
RBC # BLD AUTO: 3.84 10*6/MM3 (ref 4.14–5.8)
RBC # BLD AUTO: 4.19 10*6/MM3 (ref 4.14–5.8)
RBC # BLD AUTO: 4.65 10*6/MM3 (ref 4.14–5.8)
RBC # UR STRIP: ABNORMAL /HPF
REF LAB TEST METHOD: ABNORMAL
RESPIRATORY RATE: 12
RHINOVIRUS RNA SPEC NAA+PROBE: NOT DETECTED
RSV RNA NPH QL NAA+NON-PROBE: NOT DETECTED
SAO2 % BLDCOA: 99.9 % (ref 94–98)
SARS-COV-2 RNA NPH QL NAA+NON-PROBE: NOT DETECTED
SCAN SLIDE: NORMAL
SODIUM SERPL-SCNC: 137 MMOL/L (ref 136–145)
SODIUM SERPL-SCNC: 138 MMOL/L (ref 136–145)
SODIUM SERPL-SCNC: 140 MMOL/L (ref 136–145)
SODIUM SERPL-SCNC: 141 MMOL/L (ref 136–145)
SODIUM SERPL-SCNC: 145 MMOL/L (ref 136–145)
SP GR UR STRIP: 1.01 (ref 1–1.03)
SQUAMOUS #/AREA URNS HPF: ABNORMAL /HPF
TROPONIN T SERPL HS-MCNC: 42 NG/L
UROBILINOGEN UR QL STRIP: ABNORMAL
VARIANT LYMPHS NFR BLD MANUAL: 25 % (ref 19.6–45.3)
VENTILATOR MODE: AC
VT ON VENT VENT: 450 ML
WBC # UR STRIP: ABNORMAL /HPF
WBC MORPH BLD: NORMAL
WBC NRBC COR # BLD AUTO: 14.9 10*3/MM3 (ref 3.4–10.8)
WBC NRBC COR # BLD AUTO: 15.87 10*3/MM3 (ref 3.4–10.8)
WBC NRBC COR # BLD AUTO: 17.06 10*3/MM3 (ref 3.4–10.8)
WBC NRBC COR # BLD AUTO: 20.39 10*3/MM3 (ref 3.4–10.8)
WBC NRBC COR # BLD AUTO: 27.5 10*3/MM3 (ref 3.4–10.8)

## 2024-01-01 PROCEDURE — 25010000002 DIPHENHYDRAMINE PER 50 MG: Performed by: INTERNAL MEDICINE

## 2024-01-01 PROCEDURE — 25810000003 SODIUM CHLORIDE 0.9 % SOLUTION: Performed by: EMERGENCY MEDICINE

## 2024-01-01 PROCEDURE — 25510000001 IOPAMIDOL PER 1 ML: Performed by: SURGERY

## 2024-01-01 PROCEDURE — 25010000002 MORPHINE PER 10 MG: Performed by: INTERNAL MEDICINE

## 2024-01-01 PROCEDURE — 94799 UNLISTED PULMONARY SVC/PX: CPT

## 2024-01-01 PROCEDURE — 25010000002 FENTANYL CITRATE (PF) 50 MCG/ML SOLUTION: Performed by: NURSE ANESTHETIST, CERTIFIED REGISTERED

## 2024-01-01 PROCEDURE — 25010000002 PROPOFOL 200 MG/20ML EMULSION: Performed by: NURSE ANESTHETIST, CERTIFIED REGISTERED

## 2024-01-01 PROCEDURE — 84484 ASSAY OF TROPONIN QUANT: CPT | Performed by: EMERGENCY MEDICINE

## 2024-01-01 PROCEDURE — 25010000002 MORPHINE PER 10 MG

## 2024-01-01 PROCEDURE — 25810000003 SODIUM CHLORIDE PER 500 ML: Performed by: SURGERY

## 2024-01-01 PROCEDURE — 97110 THERAPEUTIC EXERCISES: CPT

## 2024-01-01 PROCEDURE — 25810000003 LACTATED RINGERS PER 1000 ML: Performed by: NURSE ANESTHETIST, CERTIFIED REGISTERED

## 2024-01-01 PROCEDURE — 25010000002 BUPIVACAINE (PF) 0.25 % SOLUTION: Performed by: STUDENT IN AN ORGANIZED HEALTH CARE EDUCATION/TRAINING PROGRAM

## 2024-01-01 PROCEDURE — 94761 N-INVAS EAR/PLS OXIMETRY MLT: CPT

## 2024-01-01 PROCEDURE — 25010000002 DEXAMETHASONE SODIUM PHOSPHATE 20 MG/5ML SOLUTION: Performed by: NURSE ANESTHETIST, CERTIFIED REGISTERED

## 2024-01-01 PROCEDURE — C1725 CATH, TRANSLUMIN NON-LASER: HCPCS | Performed by: STUDENT IN AN ORGANIZED HEALTH CARE EDUCATION/TRAINING PROGRAM

## 2024-01-01 PROCEDURE — 36600 WITHDRAWAL OF ARTERIAL BLOOD: CPT | Performed by: EMERGENCY MEDICINE

## 2024-01-01 PROCEDURE — 93226 XTRNL ECG REC<48 HR SCAN A/R: CPT

## 2024-01-01 PROCEDURE — 25810000003 SODIUM CHLORIDE 0.9 % SOLUTION 500 ML FLEX CONT: Performed by: SURGERY

## 2024-01-01 PROCEDURE — 96367 TX/PROPH/DG ADDL SEQ IV INF: CPT

## 2024-01-01 PROCEDURE — 0202U NFCT DS 22 TRGT SARS-COV-2: CPT | Performed by: EMERGENCY MEDICINE

## 2024-01-01 PROCEDURE — 25010000002 MIDAZOLAM PER 1 MG

## 2024-01-01 PROCEDURE — 93010 ELECTROCARDIOGRAM REPORT: CPT | Performed by: INTERNAL MEDICINE

## 2024-01-01 PROCEDURE — 81001 URINALYSIS AUTO W/SCOPE: CPT | Performed by: EMERGENCY MEDICINE

## 2024-01-01 PROCEDURE — 25010000002 HYDROMORPHONE HCL PF 50 MG/5ML SOLUTION: Performed by: SURGERY

## 2024-01-01 PROCEDURE — 04FU3Z0 FRAGMENTATION OF LEFT PERONEAL ARTERY, PERCUTANEOUS APPROACH, ULTRASONIC: ICD-10-PCS | Performed by: SURGERY

## 2024-01-01 PROCEDURE — 83880 ASSAY OF NATRIURETIC PEPTIDE: CPT | Performed by: EMERGENCY MEDICINE

## 2024-01-01 PROCEDURE — 25010000002 ALTEPLASE 2 MG RECONSTITUTED SOLUTION: Performed by: SURGERY

## 2024-01-01 PROCEDURE — 25010000002 METHYLPREDNISOLONE PER 125 MG: Performed by: INTERNAL MEDICINE

## 2024-01-01 PROCEDURE — 25010000002 PROPOFOL 10 MG/ML EMULSION: Performed by: EMERGENCY MEDICINE

## 2024-01-01 PROCEDURE — 25010000002 CEFAZOLIN IN DEXTROSE 2-4 GM/100ML-% SOLUTION: Performed by: STUDENT IN AN ORGANIZED HEALTH CARE EDUCATION/TRAINING PROGRAM

## 2024-01-01 PROCEDURE — 85730 THROMBOPLASTIN TIME PARTIAL: CPT | Performed by: SURGERY

## 2024-01-01 PROCEDURE — 94664 DEMO&/EVAL PT USE INHALER: CPT

## 2024-01-01 PROCEDURE — C1894 INTRO/SHEATH, NON-LASER: HCPCS | Performed by: SURGERY

## 2024-01-01 PROCEDURE — 94002 VENT MGMT INPAT INIT DAY: CPT

## 2024-01-01 PROCEDURE — 85610 PROTHROMBIN TIME: CPT | Performed by: SURGERY

## 2024-01-01 PROCEDURE — 85014 HEMATOCRIT: CPT | Performed by: STUDENT IN AN ORGANIZED HEALTH CARE EDUCATION/TRAINING PROGRAM

## 2024-01-01 PROCEDURE — 83880 ASSAY OF NATRIURETIC PEPTIDE: CPT | Performed by: SURGERY

## 2024-01-01 PROCEDURE — 80048 BASIC METABOLIC PNL TOTAL CA: CPT

## 2024-01-01 PROCEDURE — 25810000003 SODIUM CHLORIDE 0.9 % SOLUTION 250 ML FLEX CONT: Performed by: SURGERY

## 2024-01-01 PROCEDURE — C1887 CATHETER, GUIDING: HCPCS | Performed by: SURGERY

## 2024-01-01 PROCEDURE — C2623 CATH, TRANSLUMIN, DRUG-COAT: HCPCS | Performed by: STUDENT IN AN ORGANIZED HEALTH CARE EDUCATION/TRAINING PROGRAM

## 2024-01-01 PROCEDURE — B41G1ZZ FLUOROSCOPY OF LEFT LOWER EXTREMITY ARTERIES USING LOW OSMOLAR CONTRAST: ICD-10-PCS | Performed by: SURGERY

## 2024-01-01 PROCEDURE — 25510000001 IOPAMIDOL PER 1 ML: Performed by: STUDENT IN AN ORGANIZED HEALTH CARE EDUCATION/TRAINING PROGRAM

## 2024-01-01 PROCEDURE — 93005 ELECTROCARDIOGRAM TRACING: CPT | Performed by: SURGERY

## 2024-01-01 PROCEDURE — 047L3Z1 DILATION OF LEFT FEMORAL ARTERY USING DRUG-COATED BALLOON, PERCUTANEOUS APPROACH: ICD-10-PCS | Performed by: SURGERY

## 2024-01-01 PROCEDURE — 85025 COMPLETE CBC W/AUTO DIFF WBC: CPT | Performed by: EMERGENCY MEDICINE

## 2024-01-01 PROCEDURE — 25010000002 MIDAZOLAM PER 1 MG: Performed by: ANESTHESIOLOGY

## 2024-01-01 PROCEDURE — 85379 FIBRIN DEGRADATION QUANT: CPT | Performed by: EMERGENCY MEDICINE

## 2024-01-01 PROCEDURE — C1769 GUIDE WIRE: HCPCS | Performed by: SURGERY

## 2024-01-01 PROCEDURE — 82803 BLOOD GASES ANY COMBINATION: CPT | Performed by: EMERGENCY MEDICINE

## 2024-01-01 PROCEDURE — 75716 ARTERY X-RAYS ARMS/LEGS: CPT

## 2024-01-01 PROCEDURE — 96375 TX/PRO/DX INJ NEW DRUG ADDON: CPT

## 2024-01-01 PROCEDURE — 84145 PROCALCITONIN (PCT): CPT

## 2024-01-01 PROCEDURE — 25010000002 OLANZAPINE 10 MG RECONSTITUTED SOLUTION 1 EACH VIAL: Performed by: INTERNAL MEDICINE

## 2024-01-01 PROCEDURE — 96376 TX/PRO/DX INJ SAME DRUG ADON: CPT

## 2024-01-01 PROCEDURE — 0 DEXTROSE 5 % SOLUTION 1,000 ML FLEX CONT: Performed by: EMERGENCY MEDICINE

## 2024-01-01 PROCEDURE — 25010000002 ONDANSETRON PER 1 MG: Performed by: SURGERY

## 2024-01-01 PROCEDURE — 25010000002 ALTEPLASE PER 1 MG: Performed by: SURGERY

## 2024-01-01 PROCEDURE — 85025 COMPLETE CBC W/AUTO DIFF WBC: CPT

## 2024-01-01 PROCEDURE — 97162 PT EVAL MOD COMPLEX 30 MIN: CPT

## 2024-01-01 PROCEDURE — 80053 COMPREHEN METABOLIC PANEL: CPT | Performed by: EMERGENCY MEDICINE

## 2024-01-01 PROCEDURE — 25010000002 ONDANSETRON PER 1 MG: Performed by: INTERNAL MEDICINE

## 2024-01-01 PROCEDURE — 25010000002 MIDAZOLAM PER 1 MG: Performed by: INTERNAL MEDICINE

## 2024-01-01 PROCEDURE — 3E05317 INTRODUCTION OF OTHER THROMBOLYTIC INTO PERIPHERAL ARTERY, PERCUTANEOUS APPROACH: ICD-10-PCS | Performed by: SURGERY

## 2024-01-01 PROCEDURE — B40G1ZZ PLAIN RADIOGRAPHY OF LEFT LOWER EXTREMITY ARTERIES USING LOW OSMOLAR CONTRAST: ICD-10-PCS | Performed by: SURGERY

## 2024-01-01 PROCEDURE — 25810000003 SODIUM CHLORIDE 0.9 % SOLUTION 250 ML FLEX CONT: Performed by: NURSE ANESTHETIST, CERTIFIED REGISTERED

## 2024-01-01 PROCEDURE — 99222 1ST HOSP IP/OBS MODERATE 55: CPT | Performed by: NURSE PRACTITIONER

## 2024-01-01 PROCEDURE — 51702 INSERT TEMP BLADDER CATH: CPT

## 2024-01-01 PROCEDURE — 25010000002 HEPARIN (PORCINE) PER 1000 UNITS: Performed by: STUDENT IN AN ORGANIZED HEALTH CARE EDUCATION/TRAINING PROGRAM

## 2024-01-01 PROCEDURE — 85025 COMPLETE CBC W/AUTO DIFF WBC: CPT | Performed by: SURGERY

## 2024-01-01 PROCEDURE — 25010000002 BUPIVACAINE (PF) 0.25 % SOLUTION 30 ML VIAL: Performed by: SURGERY

## 2024-01-01 PROCEDURE — 0BH17EZ INSERTION OF ENDOTRACHEAL AIRWAY INTO TRACHEA, VIA NATURAL OR ARTIFICIAL OPENING: ICD-10-PCS | Performed by: EMERGENCY MEDICINE

## 2024-01-01 PROCEDURE — 96365 THER/PROPH/DIAG IV INF INIT: CPT

## 2024-01-01 PROCEDURE — 25010000002 PHENYLEPHRINE 10 MG/ML SOLUTION 5 ML VIAL: Performed by: NURSE ANESTHETIST, CERTIFIED REGISTERED

## 2024-01-01 PROCEDURE — 85027 COMPLETE CBC AUTOMATED: CPT | Performed by: ANESTHESIOLOGY

## 2024-01-01 PROCEDURE — 25010000002 HEPARIN (PORCINE) PER 1000 UNITS: Performed by: SURGERY

## 2024-01-01 PROCEDURE — 25810000003 SODIUM CHLORIDE 0.9 % SOLUTION: Performed by: SURGERY

## 2024-01-01 PROCEDURE — 25010000002 PROTAMINE SULFATE PER 10 MG: Performed by: NURSE ANESTHETIST, CERTIFIED REGISTERED

## 2024-01-01 PROCEDURE — C1769 GUIDE WIRE: HCPCS | Performed by: STUDENT IN AN ORGANIZED HEALTH CARE EDUCATION/TRAINING PROGRAM

## 2024-01-01 PROCEDURE — 82948 REAGENT STRIP/BLOOD GLUCOSE: CPT

## 2024-01-01 PROCEDURE — 25810000003 LACTATED RINGERS PER 1000 ML: Performed by: ANESTHESIOLOGY

## 2024-01-01 PROCEDURE — 25010000002 HEPARIN (PORCINE) PER 1000 UNITS: Performed by: NURSE ANESTHETIST, CERTIFIED REGISTERED

## 2024-01-01 PROCEDURE — 85018 HEMOGLOBIN: CPT | Performed by: STUDENT IN AN ORGANIZED HEALTH CARE EDUCATION/TRAINING PROGRAM

## 2024-01-01 PROCEDURE — 93005 ELECTROCARDIOGRAM TRACING: CPT

## 2024-01-01 PROCEDURE — 75625 CONTRAST EXAM ABDOMINL AORTA: CPT

## 2024-01-01 PROCEDURE — 93225 XTRNL ECG REC<48 HRS REC: CPT

## 2024-01-01 PROCEDURE — 85384 FIBRINOGEN ACTIVITY: CPT | Performed by: SURGERY

## 2024-01-01 PROCEDURE — C1751 CATH, INF, PER/CENT/MIDLINE: HCPCS | Performed by: SURGERY

## 2024-01-01 PROCEDURE — 25010000002 LIDOCAINE 1 % SOLUTION 20 ML VIAL: Performed by: SURGERY

## 2024-01-01 PROCEDURE — 94640 AIRWAY INHALATION TREATMENT: CPT

## 2024-01-01 PROCEDURE — 25010000002 ONDANSETRON PER 1 MG: Performed by: NURSE ANESTHETIST, CERTIFIED REGISTERED

## 2024-01-01 PROCEDURE — 25010000002 CEFAZOLIN IN DEXTROSE 2-4 GM/100ML-% SOLUTION: Performed by: SURGERY

## 2024-01-01 PROCEDURE — 25010000002 FUROSEMIDE PER 20 MG: Performed by: SURGERY

## 2024-01-01 PROCEDURE — 71045 X-RAY EXAM CHEST 1 VIEW: CPT

## 2024-01-01 PROCEDURE — 85007 BL SMEAR W/DIFF WBC COUNT: CPT | Performed by: EMERGENCY MEDICINE

## 2024-01-01 PROCEDURE — C1760 CLOSURE DEV, VASC: HCPCS | Performed by: STUDENT IN AN ORGANIZED HEALTH CARE EDUCATION/TRAINING PROGRAM

## 2024-01-01 PROCEDURE — 80053 COMPREHEN METABOLIC PANEL: CPT | Performed by: ANESTHESIOLOGY

## 2024-01-01 PROCEDURE — 99291 CRITICAL CARE FIRST HOUR: CPT

## 2024-01-01 PROCEDURE — 5A1935Z RESPIRATORY VENTILATION, LESS THAN 24 CONSECUTIVE HOURS: ICD-10-PCS | Performed by: EMERGENCY MEDICINE

## 2024-01-01 RX ORDER — SODIUM CHLORIDE 9 MG/ML
75 INJECTION, SOLUTION INTRAVENOUS CONTINUOUS
Status: DISCONTINUED | OUTPATIENT
Start: 2024-01-01 | End: 2024-01-01

## 2024-01-01 RX ORDER — LORAZEPAM 2 MG/ML
0.5 CONCENTRATE ORAL
Status: DISCONTINUED | OUTPATIENT
Start: 2024-01-01 | End: 2024-01-28 | Stop reason: HOSPADM

## 2024-01-01 RX ORDER — LORAZEPAM 1 MG/1
2 TABLET ORAL
Status: DISCONTINUED | OUTPATIENT
Start: 2024-01-01 | End: 2024-01-01 | Stop reason: HOSPADM

## 2024-01-01 RX ORDER — LORAZEPAM 2 MG/ML
2 CONCENTRATE ORAL
Status: DISCONTINUED | OUTPATIENT
Start: 2024-01-01 | End: 2024-01-01 | Stop reason: HOSPADM

## 2024-01-01 RX ORDER — EPHEDRINE SULFATE 50 MG/ML
5 INJECTION, SOLUTION INTRAVENOUS ONCE AS NEEDED
Status: DISCONTINUED | OUTPATIENT
Start: 2024-01-01 | End: 2024-01-01 | Stop reason: HOSPADM

## 2024-01-01 RX ORDER — CEFAZOLIN SODIUM 2 G/100ML
2000 INJECTION, SOLUTION INTRAVENOUS ONCE
Status: COMPLETED | OUTPATIENT
Start: 2024-01-01 | End: 2024-01-01

## 2024-01-01 RX ORDER — MIDAZOLAM HYDROCHLORIDE 1 MG/ML
4 INJECTION INTRAMUSCULAR; INTRAVENOUS
Status: DISCONTINUED | OUTPATIENT
Start: 2024-01-01 | End: 2024-01-28 | Stop reason: HOSPADM

## 2024-01-01 RX ORDER — ATORVASTATIN CALCIUM 20 MG/1
40 TABLET, FILM COATED ORAL NIGHTLY
Status: DISCONTINUED | OUTPATIENT
Start: 2024-01-01 | End: 2024-01-01 | Stop reason: HOSPADM

## 2024-01-01 RX ORDER — LIDOCAINE HYDROCHLORIDE 20 MG/ML
INJECTION, SOLUTION INFILTRATION; PERINEURAL AS NEEDED
Status: DISCONTINUED | OUTPATIENT
Start: 2024-01-01 | End: 2024-01-01 | Stop reason: SURG

## 2024-01-01 RX ORDER — MIDAZOLAM HYDROCHLORIDE 1 MG/ML
INJECTION INTRAMUSCULAR; INTRAVENOUS
Status: COMPLETED
Start: 2024-01-01 | End: 2024-01-01

## 2024-01-01 RX ORDER — ALPRAZOLAM 0.25 MG/1
0.25 TABLET ORAL 3 TIMES DAILY PRN
Status: DISCONTINUED | OUTPATIENT
Start: 2024-01-01 | End: 2024-01-01 | Stop reason: HOSPADM

## 2024-01-01 RX ORDER — HYDROMORPHONE HCL/0.9% NACL/PF 10 MG/50ML
PATIENT CONTROLLED ANALGESIA SYRINGE INTRAVENOUS CONTINUOUS
Status: DISCONTINUED | OUTPATIENT
Start: 2024-01-01 | End: 2024-01-01

## 2024-01-01 RX ORDER — FENTANYL CITRATE 50 UG/ML
INJECTION, SOLUTION INTRAMUSCULAR; INTRAVENOUS AS NEEDED
Status: DISCONTINUED | OUTPATIENT
Start: 2024-01-01 | End: 2024-01-01 | Stop reason: SURG

## 2024-01-01 RX ORDER — NALOXONE HCL 0.4 MG/ML
0.2 VIAL (ML) INJECTION AS NEEDED
Status: DISCONTINUED | OUTPATIENT
Start: 2024-01-01 | End: 2024-01-01 | Stop reason: HOSPADM

## 2024-01-01 RX ORDER — SODIUM CHLORIDE, SODIUM LACTATE, POTASSIUM CHLORIDE, CALCIUM CHLORIDE 600; 310; 30; 20 MG/100ML; MG/100ML; MG/100ML; MG/100ML
INJECTION, SOLUTION INTRAVENOUS CONTINUOUS PRN
Status: DISCONTINUED | OUTPATIENT
Start: 2024-01-01 | End: 2024-01-01 | Stop reason: SURG

## 2024-01-01 RX ORDER — FENTANYL CITRATE 50 UG/ML
50 INJECTION, SOLUTION INTRAMUSCULAR; INTRAVENOUS ONCE AS NEEDED
Status: DISCONTINUED | OUTPATIENT
Start: 2024-01-01 | End: 2024-01-01 | Stop reason: HOSPADM

## 2024-01-01 RX ORDER — ACETAMINOPHEN 325 MG/1
650 TABLET ORAL EVERY 4 HOURS PRN
Status: DISCONTINUED | OUTPATIENT
Start: 2024-01-01 | End: 2024-01-01 | Stop reason: HOSPADM

## 2024-01-01 RX ORDER — ECHINACEA PURPUREA EXTRACT 125 MG
2 TABLET ORAL AS NEEDED
Status: DISCONTINUED | OUTPATIENT
Start: 2024-01-01 | End: 2024-01-01 | Stop reason: HOSPADM

## 2024-01-01 RX ORDER — OXYCODONE HYDROCHLORIDE 5 MG/1
5 TABLET ORAL EVERY 4 HOURS PRN
Status: DISCONTINUED | OUTPATIENT
Start: 2024-01-01 | End: 2024-01-01 | Stop reason: HOSPADM

## 2024-01-01 RX ORDER — SODIUM CHLORIDE, SODIUM LACTATE, POTASSIUM CHLORIDE, CALCIUM CHLORIDE 600; 310; 30; 20 MG/100ML; MG/100ML; MG/100ML; MG/100ML
9 INJECTION, SOLUTION INTRAVENOUS CONTINUOUS
Status: DISCONTINUED | OUTPATIENT
Start: 2024-01-01 | End: 2024-01-01

## 2024-01-01 RX ORDER — FAMOTIDINE 10 MG/ML
20 INJECTION, SOLUTION INTRAVENOUS ONCE
Status: COMPLETED | OUTPATIENT
Start: 2024-01-01 | End: 2024-01-01

## 2024-01-01 RX ORDER — DIPHENHYDRAMINE HYDROCHLORIDE 50 MG/ML
12.5 INJECTION INTRAMUSCULAR; INTRAVENOUS
Status: DISCONTINUED | OUTPATIENT
Start: 2024-01-01 | End: 2024-01-01 | Stop reason: HOSPADM

## 2024-01-01 RX ORDER — PROMETHAZINE HYDROCHLORIDE 25 MG/1
25 TABLET ORAL ONCE AS NEEDED
Status: DISCONTINUED | OUTPATIENT
Start: 2024-01-01 | End: 2024-01-01 | Stop reason: HOSPADM

## 2024-01-01 RX ORDER — CARBOXYMETHYLCELLULOSE SODIUM 10 MG/ML
1 GEL OPHTHALMIC
Status: DISCONTINUED | OUTPATIENT
Start: 2024-01-01 | End: 2024-01-01 | Stop reason: SDUPTHER

## 2024-01-01 RX ORDER — DEXAMETHASONE SODIUM PHOSPHATE 4 MG/ML
INJECTION, SOLUTION INTRA-ARTICULAR; INTRALESIONAL; INTRAMUSCULAR; INTRAVENOUS; SOFT TISSUE AS NEEDED
Status: DISCONTINUED | OUTPATIENT
Start: 2024-01-01 | End: 2024-01-01 | Stop reason: SURG

## 2024-01-01 RX ORDER — FENTANYL CITRATE 50 UG/ML
25 INJECTION, SOLUTION INTRAMUSCULAR; INTRAVENOUS
Status: DISCONTINUED | OUTPATIENT
Start: 2024-01-01 | End: 2024-01-01 | Stop reason: HOSPADM

## 2024-01-01 RX ORDER — DROPERIDOL 2.5 MG/ML
0.62 INJECTION, SOLUTION INTRAMUSCULAR; INTRAVENOUS
Status: DISCONTINUED | OUTPATIENT
Start: 2024-01-01 | End: 2024-01-01 | Stop reason: HOSPADM

## 2024-01-01 RX ORDER — HALOPERIDOL 5 MG/ML
1 INJECTION INTRAMUSCULAR EVERY 4 HOURS PRN
Status: DISCONTINUED | OUTPATIENT
Start: 2024-01-01 | End: 2024-01-28 | Stop reason: HOSPADM

## 2024-01-01 RX ORDER — MORPHINE SULFATE 2 MG/ML
2 INJECTION, SOLUTION INTRAMUSCULAR; INTRAVENOUS
Status: DISCONTINUED | OUTPATIENT
Start: 2024-01-01 | End: 2024-01-28 | Stop reason: HOSPADM

## 2024-01-01 RX ORDER — IPRATROPIUM BROMIDE AND ALBUTEROL SULFATE 2.5; .5 MG/3ML; MG/3ML
3 SOLUTION RESPIRATORY (INHALATION)
Status: DISCONTINUED | OUTPATIENT
Start: 2024-01-01 | End: 2024-01-01 | Stop reason: HOSPADM

## 2024-01-01 RX ORDER — HYDROMORPHONE HYDROCHLORIDE 1 MG/ML
0.5 INJECTION, SOLUTION INTRAMUSCULAR; INTRAVENOUS; SUBCUTANEOUS
Status: DISCONTINUED | OUTPATIENT
Start: 2024-01-01 | End: 2024-01-01

## 2024-01-01 RX ORDER — LIDOCAINE HYDROCHLORIDE 20 MG/ML
5 SOLUTION OROPHARYNGEAL EVERY 4 HOURS PRN
Status: DISCONTINUED | OUTPATIENT
Start: 2024-01-01 | End: 2024-01-01 | Stop reason: SDUPTHER

## 2024-01-01 RX ORDER — BUDESONIDE AND FORMOTEROL FUMARATE DIHYDRATE 160; 4.5 UG/1; UG/1
2 AEROSOL RESPIRATORY (INHALATION)
Status: DISCONTINUED | OUTPATIENT
Start: 2024-01-01 | End: 2024-01-01 | Stop reason: HOSPADM

## 2024-01-01 RX ORDER — HYDROCODONE BITARTRATE AND ACETAMINOPHEN 7.5; 325 MG/1; MG/1
1 TABLET ORAL EVERY 4 HOURS PRN
Status: DISCONTINUED | OUTPATIENT
Start: 2024-01-01 | End: 2024-01-01

## 2024-01-01 RX ORDER — DIPHENHYDRAMINE HCL 25 MG
25 CAPSULE ORAL EVERY 6 HOURS PRN
Status: DISCONTINUED | OUTPATIENT
Start: 2024-01-01 | End: 2024-01-01 | Stop reason: HOSPADM

## 2024-01-01 RX ORDER — HYDROCODONE BITARTRATE AND ACETAMINOPHEN 7.5; 325 MG/1; MG/1
1 TABLET ORAL EVERY 4 HOURS PRN
Status: DISCONTINUED | OUTPATIENT
Start: 2024-01-01 | End: 2024-01-01 | Stop reason: HOSPADM

## 2024-01-01 RX ORDER — HYDROCHLOROTHIAZIDE 50 MG/1
50 TABLET ORAL DAILY
Status: DISCONTINUED | OUTPATIENT
Start: 2024-01-01 | End: 2024-01-01 | Stop reason: HOSPADM

## 2024-01-01 RX ORDER — HYDROCODONE BITARTRATE AND ACETAMINOPHEN 5; 325 MG/1; MG/1
1 TABLET ORAL ONCE AS NEEDED
Status: DISCONTINUED | OUTPATIENT
Start: 2024-01-01 | End: 2024-01-01 | Stop reason: HOSPADM

## 2024-01-01 RX ORDER — DIPHENOXYLATE HYDROCHLORIDE AND ATROPINE SULFATE 2.5; .025 MG/1; MG/1
1 TABLET ORAL
Status: DISCONTINUED | OUTPATIENT
Start: 2024-01-01 | End: 2024-01-01 | Stop reason: SDUPTHER

## 2024-01-01 RX ORDER — OLANZAPINE 10 MG/2ML
2.5 INJECTION, POWDER, LYOPHILIZED, FOR SOLUTION INTRAMUSCULAR EVERY 8 HOURS PRN
Status: DISCONTINUED | OUTPATIENT
Start: 2024-01-01 | End: 2024-01-01 | Stop reason: HOSPADM

## 2024-01-01 RX ORDER — SCOLOPAMINE TRANSDERMAL SYSTEM 1 MG/1
1 PATCH, EXTENDED RELEASE TRANSDERMAL ONCE
Status: DISCONTINUED | OUTPATIENT
Start: 2024-01-01 | End: 2024-01-01

## 2024-01-01 RX ORDER — LORAZEPAM 1 MG/1
2 TABLET ORAL
Status: DISCONTINUED | OUTPATIENT
Start: 2024-01-01 | End: 2024-01-28 | Stop reason: HOSPADM

## 2024-01-01 RX ORDER — IPRATROPIUM BROMIDE AND ALBUTEROL SULFATE 2.5; .5 MG/3ML; MG/3ML
3 SOLUTION RESPIRATORY (INHALATION) ONCE AS NEEDED
Status: DISCONTINUED | OUTPATIENT
Start: 2024-01-01 | End: 2024-01-01 | Stop reason: HOSPADM

## 2024-01-01 RX ORDER — FUROSEMIDE 10 MG/ML
20 INJECTION INTRAMUSCULAR; INTRAVENOUS ONCE
Status: COMPLETED | OUTPATIENT
Start: 2024-01-01 | End: 2024-01-01

## 2024-01-01 RX ORDER — CEFAZOLIN SODIUM 2 G/100ML
2000 INJECTION, SOLUTION INTRAVENOUS EVERY 8 HOURS
Status: COMPLETED | OUTPATIENT
Start: 2024-01-01 | End: 2024-01-01

## 2024-01-01 RX ORDER — ACETAMINOPHEN 325 MG/1
650 TABLET ORAL EVERY 4 HOURS PRN
Status: DISCONTINUED | OUTPATIENT
Start: 2024-01-01 | End: 2024-01-01 | Stop reason: SDUPTHER

## 2024-01-01 RX ORDER — PROMETHAZINE HYDROCHLORIDE 25 MG/1
25 SUPPOSITORY RECTAL ONCE AS NEEDED
Status: DISCONTINUED | OUTPATIENT
Start: 2024-01-01 | End: 2024-01-01 | Stop reason: HOSPADM

## 2024-01-01 RX ORDER — LIDOCAINE HYDROCHLORIDE 10 MG/ML
0.5 INJECTION, SOLUTION INFILTRATION; PERINEURAL ONCE AS NEEDED
Status: DISCONTINUED | OUTPATIENT
Start: 2024-01-01 | End: 2024-01-01 | Stop reason: HOSPADM

## 2024-01-01 RX ORDER — GLYCOPYRROLATE 0.2 MG/ML
0.2 INJECTION INTRAMUSCULAR; INTRAVENOUS
Status: DISCONTINUED | OUTPATIENT
Start: 2024-01-01 | End: 2024-01-28 | Stop reason: HOSPADM

## 2024-01-01 RX ORDER — AMLODIPINE BESYLATE 5 MG/1
10 TABLET ORAL DAILY
COMMUNITY
End: 2024-01-01 | Stop reason: HOSPADM

## 2024-01-01 RX ORDER — NALOXONE HCL 0.4 MG/ML
0.1 VIAL (ML) INJECTION
Status: DISCONTINUED | OUTPATIENT
Start: 2024-01-01 | End: 2024-01-01

## 2024-01-01 RX ORDER — PROPOFOL 10 MG/ML
VIAL (ML) INTRAVENOUS
Status: ACTIVE
Start: 2024-01-01 | End: 2024-01-01

## 2024-01-01 RX ORDER — LORAZEPAM 2 MG/ML
2 CONCENTRATE ORAL
Status: DISCONTINUED | OUTPATIENT
Start: 2024-01-01 | End: 2024-01-28 | Stop reason: HOSPADM

## 2024-01-01 RX ORDER — SODIUM CHLORIDE 0.9 % (FLUSH) 0.9 %
3 SYRINGE (ML) INJECTION EVERY 12 HOURS SCHEDULED
Status: DISCONTINUED | OUTPATIENT
Start: 2024-01-01 | End: 2024-01-01 | Stop reason: HOSPADM

## 2024-01-01 RX ORDER — SODIUM CHLORIDE 0.9 % (FLUSH) 0.9 %
10 SYRINGE (ML) INJECTION AS NEEDED
Status: DISCONTINUED | OUTPATIENT
Start: 2024-01-01 | End: 2024-01-01 | Stop reason: HOSPADM

## 2024-01-01 RX ORDER — SCOLOPAMINE TRANSDERMAL SYSTEM 1 MG/1
1 PATCH, EXTENDED RELEASE TRANSDERMAL
Status: DISCONTINUED | OUTPATIENT
Start: 2024-01-01 | End: 2024-01-01 | Stop reason: HOSPADM

## 2024-01-01 RX ORDER — MIDAZOLAM HYDROCHLORIDE 1 MG/ML
2 INJECTION INTRAMUSCULAR; INTRAVENOUS
Status: DISCONTINUED | OUTPATIENT
Start: 2024-01-01 | End: 2024-01-28 | Stop reason: HOSPADM

## 2024-01-01 RX ORDER — ACETAMINOPHEN 650 MG/1
650 SUPPOSITORY RECTAL EVERY 4 HOURS PRN
Status: DISCONTINUED | OUTPATIENT
Start: 2024-01-01 | End: 2024-01-28 | Stop reason: HOSPADM

## 2024-01-01 RX ORDER — LORAZEPAM 0.5 MG/1
0.5 TABLET ORAL
Status: DISCONTINUED | OUTPATIENT
Start: 2024-01-01 | End: 2024-01-28 | Stop reason: HOSPADM

## 2024-01-01 RX ORDER — MIDAZOLAM HYDROCHLORIDE 1 MG/ML
0.5 INJECTION INTRAMUSCULAR; INTRAVENOUS
Status: COMPLETED | OUTPATIENT
Start: 2024-01-01 | End: 2024-01-01

## 2024-01-01 RX ORDER — IPRATROPIUM BROMIDE AND ALBUTEROL SULFATE 2.5; .5 MG/3ML; MG/3ML
3 SOLUTION RESPIRATORY (INHALATION)
Status: DISCONTINUED | OUTPATIENT
Start: 2024-01-01 | End: 2024-01-01

## 2024-01-01 RX ORDER — DIPHENHYDRAMINE HCL 25 MG
25 CAPSULE ORAL EVERY 6 HOURS PRN
Status: DISCONTINUED | OUTPATIENT
Start: 2024-01-01 | End: 2024-01-01 | Stop reason: SDUPTHER

## 2024-01-01 RX ORDER — METHYLPREDNISOLONE SODIUM SUCCINATE 125 MG/2ML
60 INJECTION, POWDER, LYOPHILIZED, FOR SOLUTION INTRAMUSCULAR; INTRAVENOUS ONCE
Status: COMPLETED | OUTPATIENT
Start: 2024-01-01 | End: 2024-01-01

## 2024-01-01 RX ORDER — MIDAZOLAM HYDROCHLORIDE 1 MG/ML
1 INJECTION INTRAMUSCULAR; INTRAVENOUS
Status: DISCONTINUED | OUTPATIENT
Start: 2024-01-01 | End: 2024-01-28 | Stop reason: HOSPADM

## 2024-01-01 RX ORDER — ONDANSETRON 4 MG/1
4 TABLET, ORALLY DISINTEGRATING ORAL EVERY 6 HOURS PRN
Status: DISCONTINUED | OUTPATIENT
Start: 2024-01-01 | End: 2024-01-01 | Stop reason: HOSPADM

## 2024-01-01 RX ORDER — MIDAZOLAM HYDROCHLORIDE 1 MG/ML
1 INJECTION INTRAMUSCULAR; INTRAVENOUS
Status: DISCONTINUED | OUTPATIENT
Start: 2024-01-01 | End: 2024-01-01

## 2024-01-01 RX ORDER — MIDAZOLAM HYDROCHLORIDE 1 MG/ML
2 INJECTION INTRAMUSCULAR; INTRAVENOUS EVERY 4 HOURS
Status: DISCONTINUED | OUTPATIENT
Start: 2024-01-01 | End: 2024-01-01 | Stop reason: HOSPADM

## 2024-01-01 RX ORDER — VENLAFAXINE HYDROCHLORIDE 150 MG/1
150 CAPSULE, EXTENDED RELEASE ORAL NIGHTLY
Status: DISCONTINUED | OUTPATIENT
Start: 2024-01-01 | End: 2024-01-01 | Stop reason: HOSPADM

## 2024-01-01 RX ORDER — SCOLOPAMINE TRANSDERMAL SYSTEM 1 MG/1
1 PATCH, EXTENDED RELEASE TRANSDERMAL
Status: DISCONTINUED | OUTPATIENT
Start: 2024-01-01 | End: 2024-01-01 | Stop reason: SDUPTHER

## 2024-01-01 RX ORDER — LABETALOL HYDROCHLORIDE 5 MG/ML
5 INJECTION, SOLUTION INTRAVENOUS
Status: DISCONTINUED | OUTPATIENT
Start: 2024-01-01 | End: 2024-01-01 | Stop reason: HOSPADM

## 2024-01-01 RX ORDER — CARBOXYMETHYLCELLULOSE SODIUM 10 MG/ML
1 GEL OPHTHALMIC
Status: DISCONTINUED | OUTPATIENT
Start: 2024-01-01 | End: 2024-01-28 | Stop reason: HOSPADM

## 2024-01-01 RX ORDER — SODIUM CHLORIDE 9 MG/ML
40 INJECTION, SOLUTION INTRAVENOUS AS NEEDED
Status: DISCONTINUED | OUTPATIENT
Start: 2024-01-01 | End: 2024-01-01 | Stop reason: HOSPADM

## 2024-01-01 RX ORDER — PROPOFOL 10 MG/ML
INJECTION, EMULSION INTRAVENOUS AS NEEDED
Status: DISCONTINUED | OUTPATIENT
Start: 2024-01-01 | End: 2024-01-01 | Stop reason: SURG

## 2024-01-01 RX ORDER — IPRATROPIUM BROMIDE AND ALBUTEROL SULFATE 2.5; .5 MG/3ML; MG/3ML
3 SOLUTION RESPIRATORY (INHALATION) EVERY 4 HOURS PRN
Status: DISCONTINUED | OUTPATIENT
Start: 2024-01-01 | End: 2024-01-01 | Stop reason: HOSPADM

## 2024-01-01 RX ORDER — AMLODIPINE BESYLATE 10 MG/1
10 TABLET ORAL DAILY
Status: DISCONTINUED | OUTPATIENT
Start: 2024-01-01 | End: 2024-01-01 | Stop reason: HOSPADM

## 2024-01-01 RX ORDER — LORAZEPAM 2 MG/ML
1 CONCENTRATE ORAL
Status: DISCONTINUED | OUTPATIENT
Start: 2024-01-01 | End: 2024-01-28 | Stop reason: HOSPADM

## 2024-01-01 RX ORDER — ONDANSETRON 2 MG/ML
4 INJECTION INTRAMUSCULAR; INTRAVENOUS EVERY 6 HOURS PRN
Status: DISCONTINUED | OUTPATIENT
Start: 2024-01-01 | End: 2024-01-01 | Stop reason: HOSPADM

## 2024-01-01 RX ORDER — MORPHINE SULFATE 20 MG/ML
10 SOLUTION ORAL
Status: DISCONTINUED | OUTPATIENT
Start: 2024-01-01 | End: 2024-01-28 | Stop reason: HOSPADM

## 2024-01-01 RX ORDER — ONDANSETRON 2 MG/ML
4 INJECTION INTRAMUSCULAR; INTRAVENOUS ONCE AS NEEDED
Status: DISCONTINUED | OUTPATIENT
Start: 2024-01-01 | End: 2024-01-01 | Stop reason: HOSPADM

## 2024-01-01 RX ORDER — HEPARIN SODIUM 1000 [USP'U]/ML
INJECTION, SOLUTION INTRAVENOUS; SUBCUTANEOUS AS NEEDED
Status: DISCONTINUED | OUTPATIENT
Start: 2024-01-01 | End: 2024-01-01 | Stop reason: SURG

## 2024-01-01 RX ORDER — ACETAMINOPHEN 650 MG/1
650 SUPPOSITORY RECTAL EVERY 4 HOURS PRN
Status: DISCONTINUED | OUTPATIENT
Start: 2024-01-01 | End: 2024-01-01 | Stop reason: SDUPTHER

## 2024-01-01 RX ORDER — DIPHENHYDRAMINE HYDROCHLORIDE 50 MG/ML
25 INJECTION INTRAMUSCULAR; INTRAVENOUS EVERY 6 HOURS PRN
Status: DISCONTINUED | OUTPATIENT
Start: 2024-01-01 | End: 2024-01-01 | Stop reason: SDUPTHER

## 2024-01-01 RX ORDER — PROTAMINE SULFATE 10 MG/ML
INJECTION, SOLUTION INTRAVENOUS AS NEEDED
Status: DISCONTINUED | OUTPATIENT
Start: 2024-01-01 | End: 2024-01-01 | Stop reason: SURG

## 2024-01-01 RX ORDER — ACETAMINOPHEN 160 MG/5ML
650 SOLUTION ORAL EVERY 4 HOURS PRN
Status: DISCONTINUED | OUTPATIENT
Start: 2024-01-01 | End: 2024-01-28 | Stop reason: HOSPADM

## 2024-01-01 RX ORDER — FLUMAZENIL 0.1 MG/ML
0.2 INJECTION INTRAVENOUS AS NEEDED
Status: DISCONTINUED | OUTPATIENT
Start: 2024-01-01 | End: 2024-01-01 | Stop reason: HOSPADM

## 2024-01-01 RX ORDER — NITROGLYCERIN 0.4 MG/1
0.4 TABLET SUBLINGUAL
Status: DISCONTINUED | OUTPATIENT
Start: 2024-01-01 | End: 2024-01-01 | Stop reason: HOSPADM

## 2024-01-01 RX ORDER — ECHINACEA PURPUREA EXTRACT 125 MG
2 TABLET ORAL AS NEEDED
Status: DISCONTINUED | OUTPATIENT
Start: 2024-01-01 | End: 2024-01-01 | Stop reason: SDUPTHER

## 2024-01-01 RX ORDER — TRAZODONE HYDROCHLORIDE 100 MG/1
100 TABLET ORAL NIGHTLY
Status: DISCONTINUED | OUTPATIENT
Start: 2024-01-01 | End: 2024-01-01 | Stop reason: HOSPADM

## 2024-01-01 RX ORDER — HYDROCODONE BITARTRATE AND ACETAMINOPHEN 5; 325 MG/1; MG/1
1 TABLET ORAL EVERY 6 HOURS PRN
Status: DISCONTINUED | OUTPATIENT
Start: 2024-01-01 | End: 2024-01-01

## 2024-01-01 RX ORDER — SCOLOPAMINE TRANSDERMAL SYSTEM 1 MG/1
1 PATCH, EXTENDED RELEASE TRANSDERMAL
Status: DISCONTINUED | OUTPATIENT
Start: 2024-01-01 | End: 2024-01-28 | Stop reason: HOSPADM

## 2024-01-01 RX ORDER — ONDANSETRON 2 MG/ML
4 INJECTION INTRAMUSCULAR; INTRAVENOUS EVERY 6 HOURS PRN
Status: DISCONTINUED | OUTPATIENT
Start: 2024-01-01 | End: 2024-01-01 | Stop reason: SDUPTHER

## 2024-01-01 RX ORDER — MORPHINE SULFATE 2 MG/ML
2 INJECTION, SOLUTION INTRAMUSCULAR; INTRAVENOUS
Status: DISCONTINUED | OUTPATIENT
Start: 2024-01-01 | End: 2024-01-01

## 2024-01-01 RX ORDER — NITROGLYCERIN 0.4 MG/1
0.4 TABLET SUBLINGUAL
Status: DISCONTINUED | OUTPATIENT
Start: 2024-01-01 | End: 2024-01-01

## 2024-01-01 RX ORDER — MIDAZOLAM HYDROCHLORIDE 1 MG/ML
2 INJECTION INTRAMUSCULAR; INTRAVENOUS ONCE
Status: COMPLETED | OUTPATIENT
Start: 2024-01-01 | End: 2024-01-01

## 2024-01-01 RX ORDER — HYDRALAZINE HYDROCHLORIDE 20 MG/ML
5 INJECTION INTRAMUSCULAR; INTRAVENOUS
Status: DISCONTINUED | OUTPATIENT
Start: 2024-01-01 | End: 2024-01-01 | Stop reason: HOSPADM

## 2024-01-01 RX ORDER — MORPHINE SULFATE 2 MG/ML
2 INJECTION, SOLUTION INTRAMUSCULAR; INTRAVENOUS ONCE
Status: COMPLETED | OUTPATIENT
Start: 2024-01-01 | End: 2024-01-01

## 2024-01-01 RX ORDER — BUPIVACAINE HYDROCHLORIDE 2.5 MG/ML
INJECTION, SOLUTION EPIDURAL; INFILTRATION; INTRACAUDAL AS NEEDED
Status: DISCONTINUED | OUTPATIENT
Start: 2024-01-01 | End: 2024-01-01 | Stop reason: HOSPADM

## 2024-01-01 RX ORDER — IPRATROPIUM BROMIDE AND ALBUTEROL SULFATE 2.5; .5 MG/3ML; MG/3ML
3 SOLUTION RESPIRATORY (INHALATION) EVERY 4 HOURS PRN
Status: DISCONTINUED | OUTPATIENT
Start: 2024-01-01 | End: 2024-01-01 | Stop reason: SDUPTHER

## 2024-01-01 RX ORDER — ACETAMINOPHEN 325 MG/1
650 TABLET ORAL EVERY 4 HOURS PRN
Status: DISCONTINUED | OUTPATIENT
Start: 2024-01-01 | End: 2024-01-28 | Stop reason: HOSPADM

## 2024-01-01 RX ORDER — LORAZEPAM 1 MG/1
1 TABLET ORAL
Status: DISCONTINUED | OUTPATIENT
Start: 2024-01-01 | End: 2024-01-28 | Stop reason: HOSPADM

## 2024-01-01 RX ORDER — MORPHINE SULFATE 2 MG/ML
INJECTION, SOLUTION INTRAMUSCULAR; INTRAVENOUS
Status: COMPLETED
Start: 2024-01-01 | End: 2024-01-01

## 2024-01-01 RX ORDER — DIPHENHYDRAMINE HYDROCHLORIDE 50 MG/ML
25 INJECTION INTRAMUSCULAR; INTRAVENOUS EVERY 6 HOURS PRN
Status: DISCONTINUED | OUTPATIENT
Start: 2024-01-01 | End: 2024-01-01 | Stop reason: HOSPADM

## 2024-01-01 RX ORDER — DIPHENOXYLATE HYDROCHLORIDE AND ATROPINE SULFATE 2.5; .025 MG/1; MG/1
1 TABLET ORAL
Status: DISCONTINUED | OUTPATIENT
Start: 2024-01-01 | End: 2024-01-28 | Stop reason: HOSPADM

## 2024-01-01 RX ORDER — LIDOCAINE HYDROCHLORIDE 20 MG/ML
5 SOLUTION OROPHARYNGEAL EVERY 4 HOURS PRN
Status: DISCONTINUED | OUTPATIENT
Start: 2024-01-01 | End: 2024-01-01 | Stop reason: HOSPADM

## 2024-01-01 RX ORDER — SODIUM CHLORIDE 0.9 % (FLUSH) 0.9 %
10 SYRINGE (ML) INJECTION EVERY 12 HOURS SCHEDULED
Status: DISCONTINUED | OUTPATIENT
Start: 2024-01-01 | End: 2024-01-01 | Stop reason: HOSPADM

## 2024-01-01 RX ORDER — ONDANSETRON 2 MG/ML
INJECTION INTRAMUSCULAR; INTRAVENOUS AS NEEDED
Status: DISCONTINUED | OUTPATIENT
Start: 2024-01-01 | End: 2024-01-01 | Stop reason: SURG

## 2024-01-01 RX ORDER — HALOPERIDOL 1 MG/1
1 TABLET ORAL EVERY 4 HOURS PRN
Status: DISCONTINUED | OUTPATIENT
Start: 2024-01-01 | End: 2024-01-28 | Stop reason: HOSPADM

## 2024-01-01 RX ORDER — HYDROMORPHONE HYDROCHLORIDE 1 MG/ML
0.25 INJECTION, SOLUTION INTRAMUSCULAR; INTRAVENOUS; SUBCUTANEOUS
Status: DISCONTINUED | OUTPATIENT
Start: 2024-01-01 | End: 2024-01-01 | Stop reason: HOSPADM

## 2024-01-01 RX ORDER — SODIUM CHLORIDE 9 MG/ML
35 INJECTION, SOLUTION INTRAVENOUS CONTINUOUS
Status: DISCONTINUED | OUTPATIENT
Start: 2024-01-01 | End: 2024-01-01

## 2024-01-01 RX ORDER — PROPOFOL 10 MG/ML
INJECTION, EMULSION INTRAVENOUS CONTINUOUS PRN
Status: DISCONTINUED | OUTPATIENT
Start: 2024-01-01 | End: 2024-01-01 | Stop reason: SURG

## 2024-01-01 RX ORDER — CARBOXYMETHYLCELLULOSE SODIUM 10 MG/ML
1 GEL OPHTHALMIC
Status: DISCONTINUED | OUTPATIENT
Start: 2024-01-01 | End: 2024-01-01 | Stop reason: HOSPADM

## 2024-01-01 RX ORDER — HEPARIN SODIUM 10000 [USP'U]/100ML
250 INJECTION, SOLUTION INTRAVENOUS CONTINUOUS
Status: DISCONTINUED | OUTPATIENT
Start: 2024-01-01 | End: 2024-01-01

## 2024-01-01 RX ORDER — ACETAMINOPHEN 650 MG/1
650 SUPPOSITORY RECTAL EVERY 4 HOURS PRN
Status: DISCONTINUED | OUTPATIENT
Start: 2024-01-01 | End: 2024-01-01 | Stop reason: HOSPADM

## 2024-01-01 RX ORDER — ACETAMINOPHEN 160 MG/5ML
650 SOLUTION ORAL EVERY 4 HOURS PRN
Status: DISCONTINUED | OUTPATIENT
Start: 2024-01-01 | End: 2024-01-01 | Stop reason: HOSPADM

## 2024-01-01 RX ORDER — ONDANSETRON 4 MG/1
4 TABLET, ORALLY DISINTEGRATING ORAL EVERY 6 HOURS PRN
Status: DISCONTINUED | OUTPATIENT
Start: 2024-01-01 | End: 2024-01-01 | Stop reason: SDUPTHER

## 2024-01-01 RX ORDER — CLOPIDOGREL BISULFATE 75 MG/1
75 TABLET ORAL DAILY
Status: DISCONTINUED | OUTPATIENT
Start: 2024-01-01 | End: 2024-01-01 | Stop reason: HOSPADM

## 2024-01-01 RX ORDER — SODIUM CHLORIDE 9 MG/ML
30 INJECTION, SOLUTION INTRAVENOUS CONTINUOUS PRN
Status: DISCONTINUED | OUTPATIENT
Start: 2024-01-01 | End: 2024-01-01

## 2024-01-01 RX ORDER — MIDAZOLAM HYDROCHLORIDE 1 MG/ML
2 INJECTION INTRAMUSCULAR; INTRAVENOUS
Status: DISCONTINUED | OUTPATIENT
Start: 2024-01-01 | End: 2024-01-01 | Stop reason: HOSPADM

## 2024-01-01 RX ORDER — DIPHENOXYLATE HYDROCHLORIDE AND ATROPINE SULFATE 2.5; .025 MG/1; MG/1
1 TABLET ORAL
Status: DISCONTINUED | OUTPATIENT
Start: 2024-01-01 | End: 2024-01-01 | Stop reason: HOSPADM

## 2024-01-01 RX ORDER — ACETAMINOPHEN 160 MG/5ML
650 SOLUTION ORAL EVERY 4 HOURS PRN
Status: DISCONTINUED | OUTPATIENT
Start: 2024-01-01 | End: 2024-01-01 | Stop reason: SDUPTHER

## 2024-01-01 RX ORDER — SODIUM CHLORIDE 9 MG/ML
INJECTION, SOLUTION INTRAVENOUS AS NEEDED
Status: DISCONTINUED | OUTPATIENT
Start: 2024-01-01 | End: 2024-01-01 | Stop reason: HOSPADM

## 2024-01-01 RX ORDER — GLYCOPYRROLATE 0.2 MG/ML
0.4 INJECTION INTRAMUSCULAR; INTRAVENOUS
Status: DISCONTINUED | OUTPATIENT
Start: 2024-01-01 | End: 2024-01-28 | Stop reason: HOSPADM

## 2024-01-01 RX ORDER — SODIUM CHLORIDE 0.9 % (FLUSH) 0.9 %
3-10 SYRINGE (ML) INJECTION AS NEEDED
Status: DISCONTINUED | OUTPATIENT
Start: 2024-01-01 | End: 2024-01-01 | Stop reason: HOSPADM

## 2024-01-01 RX ADMIN — MIDAZOLAM HYDROCHLORIDE 1 MG: 2 INJECTION, SOLUTION INTRAMUSCULAR; INTRAVENOUS at 05:26

## 2024-01-01 RX ADMIN — MIDAZOLAM 2 MG: 1 INJECTION INTRAMUSCULAR; INTRAVENOUS at 17:20

## 2024-01-01 RX ADMIN — MORPHINE SULFATE 4 MG: 4 INJECTION, SOLUTION INTRAMUSCULAR; INTRAVENOUS at 17:00

## 2024-01-01 RX ADMIN — TRAZODONE HYDROCHLORIDE 100 MG: 100 TABLET ORAL at 21:11

## 2024-01-01 RX ADMIN — IOPAMIDOL 60 ML: 510 INJECTION, SOLUTION INTRAVASCULAR at 15:50

## 2024-01-01 RX ADMIN — SODIUM BICARBONATE 125 MEQ: 84 INJECTION, SOLUTION INTRAVENOUS at 10:10

## 2024-01-01 RX ADMIN — TRAZODONE HYDROCHLORIDE 100 MG: 100 TABLET ORAL at 21:35

## 2024-01-01 RX ADMIN — IPRATROPIUM BROMIDE AND ALBUTEROL SULFATE 3 ML: 2.5; .5 SOLUTION RESPIRATORY (INHALATION) at 23:17

## 2024-01-01 RX ADMIN — HEPARIN SODIUM 10000 UNITS: 1000 INJECTION, SOLUTION INTRAVENOUS; SUBCUTANEOUS at 16:13

## 2024-01-01 RX ADMIN — DEXAMETHASONE SODIUM PHOSPHATE 8 MG: 4 INJECTION, SOLUTION INTRAMUSCULAR; INTRAVENOUS at 14:44

## 2024-01-01 RX ADMIN — PHENYLEPHRINE HYDROCHLORIDE 0.4 MCG/KG/MIN: 10 INJECTION, SOLUTION INTRAVENOUS at 14:54

## 2024-01-01 RX ADMIN — FENTANYL CITRATE 25 MCG: 50 INJECTION, SOLUTION INTRAMUSCULAR; INTRAVENOUS at 15:02

## 2024-01-01 RX ADMIN — Medication 10 ML: at 11:07

## 2024-01-01 RX ADMIN — BUDESONIDE AND FORMOTEROL FUMARATE DIHYDRATE 2 PUFF: 160; 4.5 AEROSOL RESPIRATORY (INHALATION) at 20:41

## 2024-01-01 RX ADMIN — ONDANSETRON 4 MG: 2 INJECTION INTRAMUSCULAR; INTRAVENOUS at 14:44

## 2024-01-01 RX ADMIN — IPRATROPIUM BROMIDE AND ALBUTEROL SULFATE 3 ML: 2.5; .5 SOLUTION RESPIRATORY (INHALATION) at 07:41

## 2024-01-01 RX ADMIN — SODIUM CHLORIDE, POTASSIUM CHLORIDE, SODIUM LACTATE AND CALCIUM CHLORIDE 9 ML/HR: 600; 310; 30; 20 INJECTION, SOLUTION INTRAVENOUS at 12:41

## 2024-01-01 RX ADMIN — SALINE NASAL SPRAY 2 SPRAY: 1.5 SOLUTION NASAL at 15:01

## 2024-01-01 RX ADMIN — IOPAMIDOL 62 ML: 510 INJECTION, SOLUTION INTRAVASCULAR at 16:50

## 2024-01-01 RX ADMIN — AMLODIPINE BESYLATE 10 MG: 10 TABLET ORAL at 08:27

## 2024-01-01 RX ADMIN — MORPHINE SULFATE 2 MG: 2 INJECTION, SOLUTION INTRAMUSCULAR; INTRAVENOUS at 11:25

## 2024-01-01 RX ADMIN — HYDROCHLOROTHIAZIDE 50 MG: 50 TABLET ORAL at 08:26

## 2024-01-01 RX ADMIN — PROPOFOL 80 MG: 10 INJECTION, EMULSION INTRAVENOUS at 15:41

## 2024-01-01 RX ADMIN — MORPHINE SULFATE 4 MG: 4 INJECTION, SOLUTION INTRAMUSCULAR; INTRAVENOUS at 07:43

## 2024-01-01 RX ADMIN — MIDAZOLAM HYDROCHLORIDE 2 MG: 1 INJECTION INTRAMUSCULAR; INTRAVENOUS at 09:24

## 2024-01-01 RX ADMIN — OLANZAPINE 2.5 MG: 10 INJECTION, POWDER, FOR SOLUTION INTRAMUSCULAR at 09:43

## 2024-01-01 RX ADMIN — IPRATROPIUM BROMIDE AND ALBUTEROL SULFATE 3 ML: 2.5; .5 SOLUTION RESPIRATORY (INHALATION) at 20:38

## 2024-01-01 RX ADMIN — METHYLPREDNISOLONE SODIUM SUCCINATE 60 MG: 125 INJECTION, POWDER, FOR SOLUTION INTRAMUSCULAR; INTRAVENOUS at 11:07

## 2024-01-01 RX ADMIN — ALPRAZOLAM 0.25 MG: 0.25 TABLET ORAL at 04:38

## 2024-01-01 RX ADMIN — MORPHINE SULFATE 2 MG: 2 INJECTION, SOLUTION INTRAMUSCULAR; INTRAVENOUS at 10:22

## 2024-01-01 RX ADMIN — MIDAZOLAM 0.5 MG: 1 INJECTION INTRAMUSCULAR; INTRAVENOUS at 12:34

## 2024-01-01 RX ADMIN — MORPHINE SULFATE 2 MG: 2 INJECTION, SOLUTION INTRAMUSCULAR; INTRAVENOUS at 14:54

## 2024-01-01 RX ADMIN — FENTANYL CITRATE 50 MCG: 50 INJECTION, SOLUTION INTRAMUSCULAR; INTRAVENOUS at 16:33

## 2024-01-01 RX ADMIN — MORPHINE SULFATE 4 MG: 4 INJECTION, SOLUTION INTRAMUSCULAR; INTRAVENOUS at 21:25

## 2024-01-01 RX ADMIN — MORPHINE SULFATE 5 MG: 4 INJECTION, SOLUTION INTRAMUSCULAR; INTRAVENOUS at 15:04

## 2024-01-01 RX ADMIN — DEXAMETHASONE SODIUM PHOSPHATE 4 MG: 4 INJECTION, SOLUTION INTRAMUSCULAR; INTRAVENOUS at 16:42

## 2024-01-01 RX ADMIN — MIDAZOLAM HYDROCHLORIDE 1 MG: 2 INJECTION, SOLUTION INTRAMUSCULAR; INTRAVENOUS at 07:43

## 2024-01-01 RX ADMIN — MIDAZOLAM HYDROCHLORIDE 1 MG: 2 INJECTION, SOLUTION INTRAMUSCULAR; INTRAVENOUS at 11:06

## 2024-01-01 RX ADMIN — BUDESONIDE AND FORMOTEROL FUMARATE DIHYDRATE 2 PUFF: 160; 4.5 AEROSOL RESPIRATORY (INHALATION) at 07:42

## 2024-01-01 RX ADMIN — CEFAZOLIN SODIUM 2000 MG: 2 INJECTION, SOLUTION INTRAVENOUS at 15:17

## 2024-01-01 RX ADMIN — IPRATROPIUM BROMIDE AND ALBUTEROL SULFATE 3 ML: 2.5; .5 SOLUTION RESPIRATORY (INHALATION) at 11:52

## 2024-01-01 RX ADMIN — MORPHINE SULFATE 5 MG: 4 INJECTION, SOLUTION INTRAMUSCULAR; INTRAVENOUS at 13:25

## 2024-01-01 RX ADMIN — MIDAZOLAM HYDROCHLORIDE 1 MG: 2 INJECTION, SOLUTION INTRAMUSCULAR; INTRAVENOUS at 13:35

## 2024-01-01 RX ADMIN — PROPOFOL 100 MCG/KG/MIN: 10 INJECTION, EMULSION INTRAVENOUS at 15:42

## 2024-01-01 RX ADMIN — PROTAMINE SULFATE 30 MG: 10 INJECTION, SOLUTION INTRAVENOUS at 16:36

## 2024-01-01 RX ADMIN — MIDAZOLAM HYDROCHLORIDE 1 MG: 2 INJECTION, SOLUTION INTRAMUSCULAR; INTRAVENOUS at 18:48

## 2024-01-01 RX ADMIN — ONDANSETRON 4 MG: 2 INJECTION INTRAMUSCULAR; INTRAVENOUS at 15:01

## 2024-01-01 RX ADMIN — SCOPALAMINE 1 PATCH: 1 PATCH, EXTENDED RELEASE TRANSDERMAL at 12:35

## 2024-01-01 RX ADMIN — VENLAFAXINE HYDROCHLORIDE 150 MG: 150 CAPSULE, EXTENDED RELEASE ORAL at 21:11

## 2024-01-01 RX ADMIN — MIDAZOLAM 0.5 MG: 1 INJECTION INTRAMUSCULAR; INTRAVENOUS at 12:40

## 2024-01-01 RX ADMIN — MIDAZOLAM HYDROCHLORIDE 1 MG: 2 INJECTION, SOLUTION INTRAMUSCULAR; INTRAVENOUS at 15:34

## 2024-01-01 RX ADMIN — IPRATROPIUM BROMIDE AND ALBUTEROL SULFATE 3 ML: 2.5; .5 SOLUTION RESPIRATORY (INHALATION) at 07:36

## 2024-01-01 RX ADMIN — SODIUM CHLORIDE 75 ML/HR: 9 INJECTION, SOLUTION INTRAVENOUS at 08:07

## 2024-01-01 RX ADMIN — ATORVASTATIN CALCIUM 40 MG: 20 TABLET, FILM COATED ORAL at 21:35

## 2024-01-01 RX ADMIN — SODIUM CHLORIDE, POTASSIUM CHLORIDE, SODIUM LACTATE AND CALCIUM CHLORIDE: 600; 310; 30; 20 INJECTION, SOLUTION INTRAVENOUS at 15:40

## 2024-01-01 RX ADMIN — MIDAZOLAM HYDROCHLORIDE 2 MG: 2 INJECTION, SOLUTION INTRAMUSCULAR; INTRAVENOUS at 13:25

## 2024-01-01 RX ADMIN — LIDOCAINE HYDROCHLORIDE 100 MG: 20 INJECTION, SOLUTION INFILTRATION; PERINEURAL at 14:44

## 2024-01-01 RX ADMIN — SODIUM CHLORIDE 1000 ML: 9 INJECTION, SOLUTION INTRAVENOUS at 10:12

## 2024-01-01 RX ADMIN — HYDROCHLOROTHIAZIDE 50 MG: 50 TABLET ORAL at 21:11

## 2024-01-01 RX ADMIN — SODIUM CHLORIDE 75 ML/HR: 9 INJECTION, SOLUTION INTRAVENOUS at 21:12

## 2024-01-01 RX ADMIN — MIDAZOLAM HYDROCHLORIDE 2 MG: 2 INJECTION, SOLUTION INTRAMUSCULAR; INTRAVENOUS at 15:04

## 2024-01-01 RX ADMIN — ONDANSETRON 4 MG: 2 INJECTION INTRAMUSCULAR; INTRAVENOUS at 16:42

## 2024-01-01 RX ADMIN — PROPOFOL INJECTABLE EMULSION 20 MCG/KG/MIN: 10 INJECTION, EMULSION INTRAVENOUS at 08:48

## 2024-01-01 RX ADMIN — CEFAZOLIN SODIUM 2000 MG: 2 INJECTION, SOLUTION INTRAVENOUS at 14:33

## 2024-01-01 RX ADMIN — MORPHINE SULFATE 4 MG: 4 INJECTION, SOLUTION INTRAMUSCULAR; INTRAVENOUS at 01:00

## 2024-01-01 RX ADMIN — MIDAZOLAM HYDROCHLORIDE 1 MG: 2 INJECTION, SOLUTION INTRAMUSCULAR; INTRAVENOUS at 03:17

## 2024-01-01 RX ADMIN — FUROSEMIDE 20 MG: 10 INJECTION, SOLUTION INTRAMUSCULAR; INTRAVENOUS at 10:44

## 2024-01-01 RX ADMIN — AMLODIPINE BESYLATE 10 MG: 10 TABLET ORAL at 21:11

## 2024-01-01 RX ADMIN — MIDAZOLAM HYDROCHLORIDE 1 MG: 2 INJECTION, SOLUTION INTRAMUSCULAR; INTRAVENOUS at 09:54

## 2024-01-01 RX ADMIN — AMLODIPINE BESYLATE 10 MG: 10 TABLET ORAL at 08:17

## 2024-01-01 RX ADMIN — APIXABAN 5 MG: 5 TABLET, FILM COATED ORAL at 08:27

## 2024-01-01 RX ADMIN — HYDROCHLOROTHIAZIDE 50 MG: 50 TABLET ORAL at 08:17

## 2024-01-01 RX ADMIN — BUDESONIDE AND FORMOTEROL FUMARATE DIHYDRATE 2 PUFF: 160; 4.5 AEROSOL RESPIRATORY (INHALATION) at 11:20

## 2024-01-01 RX ADMIN — CEFAZOLIN SODIUM 2000 MG: 2 INJECTION, SOLUTION INTRAVENOUS at 21:11

## 2024-01-01 RX ADMIN — HYDROMORPHONE HYDROCHLORIDE: 10 INJECTION, SOLUTION INTRAMUSCULAR; INTRAVENOUS; SUBCUTANEOUS at 17:31

## 2024-01-01 RX ADMIN — SODIUM CHLORIDE, POTASSIUM CHLORIDE, SODIUM LACTATE AND CALCIUM CHLORIDE: 600; 310; 30; 20 INJECTION, SOLUTION INTRAVENOUS at 14:39

## 2024-01-01 RX ADMIN — MIDAZOLAM HYDROCHLORIDE 2 MG: 2 INJECTION, SOLUTION INTRAMUSCULAR; INTRAVENOUS at 11:32

## 2024-01-01 RX ADMIN — MORPHINE SULFATE 4 MG: 4 INJECTION, SOLUTION INTRAMUSCULAR; INTRAVENOUS at 09:45

## 2024-01-01 RX ADMIN — PROPOFOL 50 MCG/KG/MIN: 10 INJECTION, EMULSION INTRAVENOUS at 14:44

## 2024-01-01 RX ADMIN — FAMOTIDINE 20 MG: 10 INJECTION INTRAVENOUS at 12:33

## 2024-01-01 RX ADMIN — IPRATROPIUM BROMIDE AND ALBUTEROL SULFATE 3 ML: 2.5; .5 SOLUTION RESPIRATORY (INHALATION) at 11:21

## 2024-01-01 RX ADMIN — CEFAZOLIN SODIUM 2000 MG: 2 INJECTION, SOLUTION INTRAVENOUS at 04:22

## 2024-01-01 RX ADMIN — FENTANYL CITRATE 50 MCG: 50 INJECTION, SOLUTION INTRAMUSCULAR; INTRAVENOUS at 15:36

## 2024-01-01 RX ADMIN — Medication 10 ML: at 07:44

## 2024-01-01 RX ADMIN — DIPHENHYDRAMINE HYDROCHLORIDE 25 MG: 50 INJECTION, SOLUTION INTRAMUSCULAR; INTRAVENOUS at 12:14

## 2024-01-01 RX ADMIN — MORPHINE SULFATE 5 MG: 4 INJECTION, SOLUTION INTRAMUSCULAR; INTRAVENOUS at 17:20

## 2024-01-01 RX ADMIN — MORPHINE SULFATE 2 MG: 2 INJECTION, SOLUTION INTRAMUSCULAR; INTRAVENOUS at 12:36

## 2024-01-01 RX ADMIN — ATORVASTATIN CALCIUM 40 MG: 20 TABLET, FILM COATED ORAL at 21:11

## 2024-01-01 RX ADMIN — APIXABAN 5 MG: 5 TABLET, FILM COATED ORAL at 21:35

## 2024-01-01 RX ADMIN — VENLAFAXINE HYDROCHLORIDE 150 MG: 150 CAPSULE, EXTENDED RELEASE ORAL at 21:35

## 2024-01-01 RX ADMIN — ALTEPLASE 1 MG/HR: KIT at 03:32

## 2024-01-01 RX ADMIN — SODIUM CHLORIDE 1000 ML: 9 INJECTION, SOLUTION INTRAVENOUS at 08:54

## 2024-01-01 RX ADMIN — MORPHINE SULFATE 4 MG: 4 INJECTION, SOLUTION INTRAMUSCULAR; INTRAVENOUS at 05:13

## 2024-01-01 RX ADMIN — MORPHINE SULFATE 5 MG: 4 INJECTION, SOLUTION INTRAMUSCULAR; INTRAVENOUS at 11:32

## 2024-01-01 RX ADMIN — MIDAZOLAM 2 MG: 1 INJECTION INTRAMUSCULAR; INTRAVENOUS at 09:24

## 2024-01-01 RX ADMIN — CLOPIDOGREL BISULFATE 75 MG: 75 TABLET, FILM COATED ORAL at 08:27

## 2024-01-01 RX ADMIN — LIDOCAINE HYDROCHLORIDE 100 MG: 20 INJECTION, SOLUTION INFILTRATION; PERINEURAL at 15:41

## 2024-01-04 ENCOUNTER — OFFICE VISIT (OUTPATIENT)
Dept: SURGERY | Facility: CLINIC | Age: 84
End: 2024-01-04
Payer: MEDICARE

## 2024-01-04 VITALS
OXYGEN SATURATION: 98 % | WEIGHT: 188 LBS | DIASTOLIC BLOOD PRESSURE: 98 MMHG | HEIGHT: 70 IN | HEART RATE: 87 BPM | TEMPERATURE: 97.5 F | BODY MASS INDEX: 26.92 KG/M2 | SYSTOLIC BLOOD PRESSURE: 170 MMHG

## 2024-01-04 DIAGNOSIS — Z09 ENCOUNTER FOR FOLLOW-UP: Primary | ICD-10-CM

## 2024-01-04 PROCEDURE — 99024 POSTOP FOLLOW-UP VISIT: CPT | Performed by: SURGERY

## 2024-01-04 RX ORDER — CLOPIDOGREL BISULFATE 75 MG/1
TABLET ORAL
COMMUNITY
Start: 2023-12-14

## 2024-01-04 NOTE — PROGRESS NOTES
CHIEF COMPLAINT:    Chief Complaint   Patient presents with    Post-op Follow-up     Po follow up excision lesion right back 12-19-23       HISTORY OF PRESENT ILLNESS:    Sohan De La Torre is a 83 y.o. male who underwent excision of a basal cell cancer from his right upper back on 12/19/2023.  He returns today for follow-up.  His pathology had been previously discussed with him by telephone and was reviewed with him again today.  He notes no issues with the incision site.    EXAM:  Vitals:    01/04/24 0908   BP: 170/98   Pulse: 87   Temp: 97.5 °F (36.4 °C)   SpO2: 98%     Healing incision on right back, sutures removed today, Steri-Strips placed     ASSESSMENT:    Status post excision of basal cell cancer from right back    PLAN:    Overall appears to be well-healed.  Can see me as needed.          This document has been electronically signed by Mason Baltazar MD on January 4, 2024 09:27 EST

## 2024-01-23 PROBLEM — T82.398A: Status: ACTIVE | Noted: 2024-01-01

## 2024-01-23 PROBLEM — T82.898A OCCLUSION OF ARTERIAL BYPASS GRAFT, INITIAL ENCOUNTER: Status: ACTIVE | Noted: 2024-01-01

## 2024-01-23 NOTE — BRIEF OP NOTE
EKOS CATHETER PLACEMENT  Progress Note    Sohan De La Torre  1/23/2024    Pre-op Diagnosis:   Occluded left femoral peroneal bypass graft       Post-Op Diagnosis Codes:  Same    Procedure/CPT® Codes:        Procedure(s):  EKOS CATHETER PLACEMENT left femoral peroneal bypass third order selective catheterization and angiogram aortoiliofemoral arteriogram and left lower extremity runoff              Surgeon(s):  Sohan Burger MD    Anesthesia: Monitored Anesthesia Care    Staff:   Circulator: Vivian Guajardo RN; Leo Gonzales RN  Scrub Person: Ruth Ann Mann  Assistant: Jigna Ortiz CST  Vascular Radiology Technician: Jesús Bell  Assistant: Jigna Ortiz CST      Estimated Blood Loss: minimal    Urine Voided: * No values recorded between 1/23/2024  2:38 PM and 1/23/2024  3:39 PM *    Specimens:                None          Drains:   Urethral Catheter Straight-tip;Silicone;Non-latex 16 Fr. (Active)       [REMOVED] Chest Tube Right Pleural (Removed)       [REMOVED] Urethral Catheter Silicone 16 Fr. (Removed)       [REMOVED] Urethral Catheter Silicone 16 Fr. (Removed)       Findings: See dictation        Complications: None    Assistant: Jigna Ortiz CST  was responsible for performing the following activities:  Wire and catheter management  and their skilled assistance was necessary for the success of this case.    Sohan Burger MD     Date: 1/23/2024  Time: 15:39 EST

## 2024-01-23 NOTE — ANESTHESIA PROCEDURE NOTES
Arterial Line      Patient reassessed immediately prior to procedure    Patient location during procedure: OR  Start time: 1/23/2024 3:55 PM  Stop Time:1/23/2024 4:01 PM       Performed By   Anesthesiologist: Sara Sánchez MD   Arterial Line Prep    Sterile Tech: gloves, mask and sterile barriers  Prep: ChloraPrep  Patient monitoring: blood pressure monitoring  Arterial Line Procedure   Laterality:right  Location:  radial artery  Catheter size: 20 G   Guidance: ultrasound guided  PROCEDURE NOTE/ULTRASOUND INTERPRETATION.  Using ultrasound guidance the potential vascular sites for insertion of the catheter were visualized to determine the patency of the vessel to be used for vascular access.  After selecting the appropriate site for insertion, the needle was visualized under ultrasound being inserted into the radial artery, followed by ultrasound confirmation of wire and catheter placement. There were no abnormalities seen on ultrasound; an image was taken; and the patient tolerated the procedure with no complications.   Number of attempts: 1  Successful placement: yes Images: still images not obtained  Post Assessment   Dressing Type: occlusive dressing applied, secured with tape and wrist guard applied.   Complications no  Circ/Move/Sens Assessment: normal.   Patient Tolerance: patient tolerated the procedure well with no apparent complications

## 2024-01-23 NOTE — OP NOTE
Operative Note  Date of Admission:  1/23/2024  OR Date: 1/23/2024    Pre-op Diagnosis:   Left leg critical limb ischemia with thrombosed left femoral peroneal bypass graft    Post-Op Diagnosis Codes:  Same    Procedure:   1) duplex directed access with aortoiliofemoral arteriogram and left lower extremity runoff, selective catheterization of peroneal artery fourth order angiogram, EKOS thrombolysis catheter placement and tPA thrombolysis initiation    Surgeon: Marlo Burger MD    Assistant:  Jigna RODRIGUEZ CSA and they provided critical assistance during the case including suctioning, exposure, retraction, and reduction of blood loss.    Anesthesia: Monitored Anesthesia Care    Staff:   Circulator: Vivian Guajardo RN; Leo Gonzales RN  Scrub Person: Ruth Ann Mann  Assistant: Jigna Ortiz CST  Vascular Radiology Technician: Jesús Bell    Estimated Blood Loss: minimal    Specimens:   Order Name Source Comment Collection Info Order Time   CBC (NO DIFF)   Collected By: Lidia Gutiérrez RN 1/23/2024 11:59 AM     Release to patient   Routine Release        COMPREHENSIVE METABOLIC PANEL   Collected By: Niki Wooten RN 1/23/2024 11:59 AM     Release to patient   Routine Release             Complications: None    Findings: Approximately 70 cm bypass graft to the peroneal artery is longer than the 50 cm available catheters.  Placement down to the level of the distal anastomosis approximately 2 cm above the anastomotic segment is performed.  The catheter will need to be pulled back approximately 12 cm to get further lysis after completion of overnight lysis.    Indications:    The patient is an 83 y.o. male seen for evaluation of thrombosed left femoral to peroneal bypass in situ.  Patient has critical limb ischemia.  Attempts at thrombolysis and salvage of the graft are being made.  He understands risk benefits complications of the procedure not limited to failure to clear the graft or clot, compartment  syndrome, major bleeding and death.  He agrees to proceed       Procedure:    Patient was prepped and draped in sterile manner.  Using duplex directionally accessed the right common femoral artery and passed a wire centrally.  Pigtail was positioned at the aortic and aortogram was performed.  The pigtail was pulled down to the bifurcation and aortoiliofemoral arteriogram was performed.  RIN catheter was used to direct a wire over the horn and wire automatically selected the occluded graft.  Injection of the graft confirmed occlusion and using advantage Glidewire and 135 cm Mckeon cross we selected the peroneal artery all the way through the graft and confirmed good position.  Following this a 50 cm E6 Turkmen EKOS catheter was placed through a 645 destination down to the level of approximately 2 cm above the the distal anastomosis.  We were not able to cross the distal anastomosis despite pushing the catheter forward.  The catheter was also approximately 12 cm short of the top of the graft.  Plans will be to pull this catheter back to 12 cm after lysing overnight.  EKOS catheter was hooked to the ultrasonic segment and tPA 2 mg/h and coolant and heparin through the sheath were all set up and hooked up.  Patient had art line and Elizondo placed prior to initiation of the tPA.  I did give 2 mg of tPA while waiting for the rest of the tPA to be set up.  At this time the patient stable and was awakened from his MAC anesthetic and sent to the recovery room and will be in the ICU overnight for strict blood pressure control.    Radiographic Findings:  Angiographic findings include single renal arteries bilaterally widely patent.  Small abdominal aortic aneurysm with bilateral iliac systems widely patent with no aneurysmal disease noted.  Both common femoral arteries and profunda femoris arteries are widely patent.  Both superficial femoral arteries are occluded at their origin and do not reconstitute meaningfully on visualized  pictures.  The left leg has an occluded bypass graft coming off that is easily selected and wire passes the entire length of the cath of the graft.  Selective catheterization and imaging of the peroneal artery shows wide patency to the peroneal.  Subsequent placement of 50 cm EKOS catheter to just about 2 cm above the anastomosis is performed.  We are not able to physically get the catheter to advance past the anastomosis in fact the wire pulled back and we tried to do so.  This was left there and was approximately 12 cm short of crossing the entire graft of the groin.  tPA is initiated.      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      Sohan Burger MD     Date: 1/23/2024  Time: 16:18 EST

## 2024-01-23 NOTE — H&P
[facility]   HISTORY AND PHYSICAL    Patient Name:Sohan De La Torre  : 1940  MRN: [id number]  Primary Care Physician: Tee Nicole MD  Date of admission: 2024    Subjective   Subjective     Chief Complaint: PVD with rest pain with failed left leg bypass graft    History of Present Illness   Sohan De La Torre is a 83 y.o. male with peripheral vascular disease and rest pain with failed left leg bypass graft.  Patient send MARILYN 0.3 after failure of the graft.  It is unclear how long the graft been down but it appears to be down for several months.  Attempts at arteriogram and possible crossing over the wire with and possible placement of EKOS catheter will be pursued.  If we can cross and placed EKOS catheter we will Isib overnight.  Otherwise we will do a diagnostic arteriogram with decide if there is any other option for bypass.  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     Cancer     lung    Depression     Femoral thrombosis     Foot pain, left     R/T PVD    History of cigarette smoking 2023    Hyperlipidemia     Hypertension     Impaired proprioception     LEFT FOOT    Ischemia of left lower extremity     Low back pain     Lung mass     UPPER RIGHT LOBE    Peripheral vascular disease 2023    PONV (postoperative nausea and vomiting)     PVD (peripheral vascular disease)     S/P partial lobectomy of lung 2023    S/P thoracentesis 2023    Spondylosis     L5-S1       Past Surgical History:   Procedure Laterality Date    AMPUTATION DIGIT Left 2022    Procedure: LEFT THIRD TOE AMPUTATION;   Surgeon: Sohan Burger MD;  Location: [facility] MAIN OR;  Service: Vascular;  Laterality: Left;    ANGIOPLASTY ILIAC ARTERY Left 08/28/2023    ATHRECTOMY ILIAC, FEMORAL, TIBIAL ARTERY Left 01/25/2022    Procedure: AORTO LIAC  FEMORAL LEFT POPLITEAL AND ANTERIOR TIBIAL LASER  ATHERECTOMY AND ANGIOPLASTY. LEFT FEMORAL AND POPLITEAL ARTERY STENT PLACEMENT;  Surgeon: Sohan Burger MD;  Location: [facility] OR 18/19;  Service: Vascular;  Laterality: Left;    ATHRECTOMY ILIAC, FEMORAL, TIBIAL ARTERY Left 06/28/2022    Procedure: AIF, Bilateral Lower Extremity Angiogram;  Surgeon: Sohan Burger MD;  Location: [facility] OR 18/19;  Service: Vascular;  Laterality: Left;    ATHRECTOMY ILIAC, FEMORAL, TIBIAL ARTERY Left 11/04/2022    Procedure: AORTO ILIAC FEMORAL LEFT LEG DRUG COATED BALLOON ANGIOPLASTY, LASER ATHERECTOMY, COIL EMBOLIZATION;  Surgeon: Sohan Burger MD;  Location: [facility] OR 18/19;  Service: Vascular;  Laterality: Left;    BRONCHOSCOPY WITH ION ROBOTIC ASSIST N/A 07/13/2023    Procedure: BRONCHOSCOPY WITH ION ROBOT UNDER FLUOROSCOPY, ENDOBRONCHIAL ULTRASOUND, FINE NEEDLE ASPIRATION, BIOPSIES, AND LAVAGE;  Surgeon: Zuhair White MD PhD;  Location: [facility] ENDOSCOPY;  Service: Robotics - Pulmonary;  Laterality: N/A;  pre: lung mass  post: lung mass    CATARACT EXTRACTION, BILATERAL      COLONOSCOPY      ENDOSCOPY      EXCISION LESION Right 12/19/2023    Procedure: EXCISION LESION of right back;  Surgeon: Mason Baltazar MD;  Location: [facility] OR;  Service: General;  Laterality: Right;    FEMORAL TIBIAL BYPASS Left 07/29/2022    Procedure: LEFT FEM PERONEAL TIBIAL BYPASS WITH INSITU GREATER SAPHENPOUS VEIN;  Surgeon: Sohan Burger MD;  Location: [facility] OR;  Service: Vascular;  Laterality: Left;    LOBECTOMY Right 07/31/2023    Procedure: BRONCHOSCOPY, THORACOSCOPY WITH RIGHT UPPER LOBECTOMY WITH Refocus ImagingI ROBOT, MEDIASTINAL LYMPH NODE DISSECTION, INTERCOSTAL NERVE BLOCK;   Surgeon: Zuhair White MD PhD;  Location: [facility];  Service: Robotics - DaVinci;  Laterality: Right;    TEMPORAL ARTERY BIOPSY Right 06/20/2023    Procedure: RIGHT TEMPORAL ARTERY BIOPSY;  Surgeon: Sohan Burger MD;  Location: [facility];  Service: Vascular;  Laterality: Right;    WISDOM TOOTH EXTRACTION         Family History: His family history is not on file.     Social History: He  reports that he quit smoking about 12 years ago. His smoking use included cigarettes. He has a 10.00 pack-year smoking history. He has been exposed to tobacco smoke. He has never used smokeless tobacco. He reports that he does not currently use alcohol. He reports that he does not use drugs.    Home Medications:  ALPRAZolam, HYDROcodone-acetaminophen, aspirin, atorvastatin, clopidogrel, dilTIAZem, furosemide, gemfibrozil, hydroCHLOROthiazide, ibuprofen, oxyCODONE, traZODone, and venlafaxine XR    Allergies:  He has No Known Allergies.    Objective    Objective     Vitals:         Physical Exam   Lungs clear and equal  Heart regular rate and rhythm  Abdomen benign  Extremities viable all the left leg ischemic without any tissue loss    Result Review    Result Review:  I have personally reviewed the results from the time of this admission to 1/23/2024 11:05 EST and agree with these findings:  []  Laboratory list / accordion  []  Microbiology  []  Radiology  []  EKG/Telemetry   []  Cardiology/Vascular   []  Pathology  []  Old records  []  Other:  Most notable findings include: Severe peripheral vascular disease on ABIs      Assessment & Plan   Assessment / Plan     Brief Patient Summary:  Sohan De La Torre is a 83 y.o. male with history of failed bypass in the left leg.  He is undergoing diagnostic arteriogram with possibility of EKOS catheter placement if we can cross the occluded graft.  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        Sohan Burger MD

## 2024-01-23 NOTE — ANESTHESIA PREPROCEDURE EVALUATION
Anesthesia Evaluation     Patient summary reviewed and Nursing notes reviewed   history of anesthetic complications:  PONV  NPO Solid Status: > 8 hours  NPO Liquid Status: > 2 hours           Airway   Mallampati: II  TM distance: >3 FB  Neck ROM: full  Dental - normal exam     Pulmonary    (+) pleural effusion, lung cancer,shortness of breath  Cardiovascular - normal exam  Exercise tolerance: good (4-7 METS)    ECG reviewed    (+) hypertension, valvular problems/murmurs AS, hyperlipidemia    ROS comment: ECHO 2/22  Left ventricular systolic function is hyperdynamic (EF > 70%).  Left ventricular wall thickness is consistent with moderate concentric hypertrophy.  Left ventricular diastolic function is consistent with (grade I) impaired relaxation  Left ventricular outflow tract peak flow gradient at rest is 9 mmHg. Left ventricular outflow tract peak gradient with valsalva is 37 mmHg.  Normal right ventricular cavity size and systolic function noted.  The left atrial cavity is moderately dilated.  Saline test results are negative.  Moderate aortic valve stenosis is present. Peak velocity of the flow distal to the aortic valve is 297.4 cm/s. Aortic valve maximum pressure gradient is 35 mmHg. Aortic valve mean pressure gradient is 19 mmHg.  There is severe nodular calcification of the mitral annulus with extension onto the both mitral valve leaflets  Mild to moderate mitral valve stenosis is present. The mitral valve mean gradient is 4 mmHg.  Mild tricuspid valve regurgitation is present.  Calculated right ventricular systolic pressure from tricuspid regurgitation is 27 mmHg.  There is no evidence of pericardial effusion      Neuro/Psych  (+) psychiatric history Depression  (-) numbness  GI/Hepatic/Renal/Endo - negative ROS     Musculoskeletal (-) negative ROS    Abdominal    Substance History - negative use     OB/GYN negative ob/gyn ROS         Other                    Anesthesia Plan    ASA 4     MAC     intravenous  induction     Anesthetic plan, risks, benefits, and alternatives have been provided, discussed and informed consent has been obtained with: patient.      CODE STATUS:

## 2024-01-24 NOTE — ANESTHESIA PREPROCEDURE EVALUATION
Anesthesia Evaluation     Patient summary reviewed and Nursing notes reviewed   history of anesthetic complications:  PONV  NPO Solid Status: > 8 hours  NPO Liquid Status: > 2 hours           Airway   Mallampati: II  TM distance: >3 FB  Neck ROM: full  Dental - normal exam     Pulmonary    (+) pleural effusion, lung cancer,shortness of breath  Cardiovascular - normal exam  Exercise tolerance: good (4-7 METS)    ECG reviewed    (+) hypertension, valvular problems/murmurs AS, PVD, hyperlipidemia    ROS comment: ECHO 2/22  Left ventricular systolic function is hyperdynamic (EF > 70%).  Left ventricular wall thickness is consistent with moderate concentric hypertrophy.  Left ventricular diastolic function is consistent with (grade I) impaired relaxation  Left ventricular outflow tract peak flow gradient at rest is 9 mmHg. Left ventricular outflow tract peak gradient with valsalva is 37 mmHg.  Normal right ventricular cavity size and systolic function noted.  The left atrial cavity is moderately dilated.  Saline test results are negative.  Moderate aortic valve stenosis is present. Peak velocity of the flow distal to the aortic valve is 297.4 cm/s. Aortic valve maximum pressure gradient is 35 mmHg. Aortic valve mean pressure gradient is 19 mmHg.  There is severe nodular calcification of the mitral annulus with extension onto the both mitral valve leaflets  Mild to moderate mitral valve stenosis is present. The mitral valve mean gradient is 4 mmHg.  Mild tricuspid valve regurgitation is present.  Calculated right ventricular systolic pressure from tricuspid regurgitation is 27 mmHg.  There is no evidence of pericardial effusion      Neuro/Psych  (+) psychiatric history Depression  (-) numbness  GI/Hepatic/Renal/Endo - negative ROS     Musculoskeletal     Abdominal    Substance History - negative use     OB/GYN negative ob/gyn ROS         Other   arthritis,   history of cancer                    Anesthesia Plan    ASA 4  "    MAC     (I have reviewed the patient's history and chart with the patient, including all pertinent laboratory results and imaging. I have explained the risks of anesthesia including but not limited to dental damage, corneal abrasion, nerve injury, MI, stroke, aspiration, and death. Patient has agreed to proceed.    /63   Pulse 77   Temp 36.8 °C (98.2 °F)   Resp 16   Ht 177.8 cm (70\")   Wt 87.4 kg (192 lb 10.9 oz)   SpO2 95%   BMI 27.65 kg/m²   )  intravenous induction     Anesthetic plan, risks, benefits, and alternatives have been provided, discussed and informed consent has been obtained with: patient.        CODE STATUS:    Level Of Support Discussed With: Patient  Code Status (Patient has no pulse and is not breathing): CPR (Attempt to Resuscitate)  Medical Interventions (Patient has pulse or is breathing): Full Support      "

## 2024-01-24 NOTE — CASE MANAGEMENT/SOCIAL WORK
Discharge Planning Assessment  [facility]     Patient Name: Sohan De La Torre  MRN: [id number]  Today's Date: 1/24/2024    Admit Date: 1/23/2024    Plan: Return home alone   Discharge Needs Assessment       Row Name 01/24/24 1304       Living Environment    People in Home alone    Current Living Arrangements home    Potentially Unsafe Housing Conditions none    In the past 12 months has the electric, gas, oil, or water company threatened to shut off services in your home? No    Primary Care Provided by self    Provides Primary Care For no one    Family Caregiver if Needed child(sammy), adult    Family Caregiver Names Ny [phone]    Able to Return to Prior Arrangements yes       Resource/Environmental Concerns    Resource/Environmental Concerns none       Food Insecurity    Within the past 12 months, you worried that your food would run out before you got the money to buy more. Never true    Within the past 12 months, the food you bought just didn't last and you didn't have money to get more. Never true       Transition Planning    Patient/Family Anticipates Transition to home    Patient/Family Anticipated Services at Transition none    Transportation Anticipated family or friend will provide       Discharge Needs Assessment    Readmission Within the Last 30 Days no previous admission in last 30 days    Equipment Currently Used at Home cane, straight;walker, standard    Concerns to be Addressed discharge planning                   Discharge Plan       Row Name 01/24/24 0553       Plan    Plan Return home alone    Patient/Family in Agreement with Plan yes    Plan Comments IMM checked. Met with patient at bedside, introduced self and explained CCP role. Verified facesheet and PCP-Tee Peña. Patient lives at home alone. Home has 2 steps to enter. Patient was Ind with ADLS and driving prior to admission. Patient has used [facility] in the past and denies h/o SNF. Patient has a cane and walker available at  home if needed. Patient uses [facility] on [facility] [facility] and reports no difficulty affording medications. Family will transport at DC. Patient anticipates no DC needs at this time. CCP to follow. Giovanni CENTENO RN                  Continued Care and Services - Admitted Since 1/23/2024    Coordination has not been started for this encounter.          Demographic Summary       Row Name 01/24/24 1300       General Information    Admission Type inpatient    Referral Source admission list    Reason for Consult discharge planning    Preferred Language English                   Functional Status       Row Name 01/24/24 1303       Functional Status    Usual Activity Tolerance good    Current Activity Tolerance good       Functional Status, IADL    Medications independent    Meal Preparation independent    Housekeeping independent    Laundry independent    Shopping independent       Mental Status    General Appearance WDL WDL       Mental Status Summary    Recent Changes in Mental Status/Cognitive Functioning no changes                   Psychosocial    No documentation.                  Abuse/Neglect    No documentation.                  Legal    No documentation.                  Substance Abuse    No documentation.                  Patient Forms    No documentation.                     Giovanni Castillo RN

## 2024-01-24 NOTE — OP NOTE
Date of Admission:  1/23/2024 01/24/24  Lewis Watts II, MD  [facility]    Preoperative Diagnosis:   Peripheral arterial disease with life style limiting claudication and thrombosed left femoral-peroneal bypass  Status post left leg thrombolysis catheter placement    Postoperative Diagnosis:   Same    Procedure Performed:   Left leg angiogram  Cessation of thrombolysis  Balloon angioplasty left femoral to peroneal artery bypass with 2.5 mm followed by 4 mm angioplasty balloon  4 x 80 mm drug-coated balloon angioplasty left femoral to peroneal artery bypass  Pro-glide Perclose closure right common femoral arteriotomy    CPT:  13545 Fem/Pop PTA  82940 Tibial PTA initial  50074 Infusion for thrombolysis, arterial, subsequent day with cessation of treatment    Surgeon:   Lewis Watts II, MD    Assistant:    Jigna Ortiz, Provided critical assistance in exposure, retraction, and suction that overall decrease blood loss and operative time.    Anesthesia:   MAC with local    Estimated Blood Loss:   Minimal    Findings:    Scant flow through thin peroneal bypass on initial angiography but good collateralization to peroneal artery.  Femoral peroneal artery bypass treated with 2.5 mm angioplasty followed by 4 mm balloon angioplasty of the entire bypass.  Significantly improved flow through bypass following this.  Perianastomotic segment of bypass then treated with 4 mm drug-coated balloon angioplasty.  Completion angiography showed excellent flow through the bypass with no residual stenosis.  Proximal anastomosis and distal anastomoses widely patent.  Inline flow to the foot via peroneal artery which reconstitutes DP.  Common femoral artery widely patent, peroneal femoral artery widely patent, profunda gives off numerous collaterals to peroneal artery.  Previously placed stents within occluded superficial femoral artery noted    Implants:    Nothing was implanted during the procedure    Specimen:    none    Complications:   none    Access:  6 Nepali retrograde access right common femoral artery    Closure:  Perclose closure device    Dispo:  To PACU    Indication for procedure:   83 y.o. male with peripheral arterial disease with previous left femoral peroneal artery bypass with vein performed by my partner .  His bypass was found to be occluded on recent office visit and so he was brought to the hospital for thrombolysis which was initiated yesterday.  Plans were made for second look angiography after 24 hours of thrombolysis today.  Details of the procedure including risk benefits and alternatives were discussed with patient verbalized understanding and agrees to proceed.    Description of procedure:   Patient brought to the vascular suite and placed supine on the endovascular table.  Sedation was begun by the anesthesia service.  The patient's groins were prepped with chlorhexidine bilaterally and his thrombolysis filament was removed and the sheath and thrombolysis catheters were prepped into the field as well.  He was draped in sterile fashion.  A timeout was performed.  Lidocaine was infiltrated around the sheath.  I then transected the EKOS catheter.  0.014 wire was advanced through the EKOS catheter into the peroneal artery under fluoroscopic guidance.  The EKOS catheter was then backed off the wire.  The patient was given 10,000 units of heparin.  Angiography was then performed through the sheath which showed scant flow through the bypass but it did appear thrombolysis had been effective.  A 2.5 mm angioplasty balloon was advanced over the wire and angioplasty was performed of the perianastomotic segment of the bypass.  Patient angiography showed minimal improvement in flow.  We then obtained a 4 mm drug-coated angioplasty balloon and attempted to advance this to the perianastomotic segment of the bypass, however it would not advance more than a few centimeters past the knee.  This was removed  and a lower profile 4 mm angioplasty balloon was obtained and this was advanced down to the perianastomotic segment of the bypass and balloon angioplasty was performed of the perianastomotic segment and then I performed balloon angioplasty of the entire bypass with this 4 mm angioplasty balloon.  Completion angiography then showed significantly improved flow through the bypass that was patent throughout its course however there did appear to be significant amount of recoil in the perianastomotic segment and just below the knee, at least 30%.  We remove this balloon and advanced the 4 mm drug-coated balloon angioplasty over the wire and performed angioplasty of the perianastomotic segment of the bypass and then retracted approximately 425 cm performed sequential angioplasty of the bypass below the knee.  There were a couple areas of waist in the balloon that did expand with a higher pressure angioplasty.  Completion angiography following this showed excellent flow through the bypass with no residual stenosis.  Peroneal artery was widely patent with inline flow to the foot via the peroneal gave off collaterals to the DP.  We then removed the 014 wire and advanced 0.035 Glidewire through the sheath.  The sheath was then backed off the wire and arteriotomy was closed with a ProGlide Perclose device without complication.  Pressure was held till hemostasis was obtained.  A single subcuticular 4-0 Vicryl stitch was placed through the skin access site.  A dressing was placed.  The patient was then aroused from sedation and his Elizondo catheter was removed and he was transported PACU in stable condition.  Patient tolerated procedure well there were no IntraOp complications and all wires catheters sheaths and other devices were removed and found to be whole and intact prior to conclusion of the procedure.  I discussed the outcome of the procedure with the patient's daughter.     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

## 2024-01-24 NOTE — NURSING NOTE
Pt making sexually inappropriate comments & demeaning remarks to RNs.  Also making derogatory comments regarding Dr Burger, critical care nursing staff & Mendez Coto.

## 2024-01-24 NOTE — PROGRESS NOTES
"[facility] Clinical Pharmacy Services: PCA Consult     Sohan De La Torre has a pharmacy consult to assess opioid medication per Dr. Burger's request based on the current protocol \"Pharmacist to discontinue PRN opioid pain medications at connection of PCA. If there is no basal rate on PCA, long-acting opioids may be continued. Scheduled opioids for pain are allowed while weaning patient off of PCA but PRN opioids should be limited to breakthrough pain only.\"     The following PRN medications have been discontinued per the protocol above:    Norco 5mg & 7.5mg orders    Mason Lee, [facility]  Clinical Pharmacist    "

## 2024-01-24 NOTE — PROGRESS NOTES
Name: Sohan De La Torre ADMIT: 2024   : 1940  PCP: Tee Nicole MD    MRN: [id number] LOS: 1 days   AGE/SEX: 83 y.o. male  ROOM: [address]    Billin-24, Subsequent Hospital Care, continued tPA lysis management      CC: EKOS with tPA thrombolysis day 1  Subjective     83 y.o. male with thrombosed right femoral peroneal bypass.  Successful tPA initiation but still no good signals in the foot.  Foot is viable.  Catheter has been pulled back 15 cm to make sure that we were able to lyse the proximal portion of the graft as well as the distal.  Will increase his rate to 2 mg/h    Review of Systems no real complaints    Objective     Scheduled Medications:   amLODIPine, 10 mg, Oral, Daily  atorvastatin, 40 mg, Oral, Nightly  hydroCHLOROthiazide, 50 mg, Oral, Daily  ipratropium-albuterol, 3 mL, Nebulization, Q8H - RT  Scopolamine, 1 patch, Transdermal, Once  traZODone, 100 mg, Oral, Nightly  venlafaxine XR, 150 mg, Oral, Nightly        Active Infusions:  alteplase (CATHFLO/ACTIVASE) 20 mg in sodium chloride 0.9 % 500 mL infusion, 1 mg/hr, Last Rate: 2 mg/hr (24 0739)  heparin, 250 Units/hr  heparin, 250 Units/hr  HYDROmorphone HCl-NaCl,   niCARdipine, 5-15 mg/hr  Pharmacy Consult,   sodium chloride, 75 mL/hr, Last Rate: 75 mL/hr (24)  sodium chloride, 35 mL/hr  sodium chloride, 30 mL/hr        As Needed Medications:    acetaminophen    ALPRAZolam    naloxone    nitroglycerin    nitroglycerin    nitroglycerin    ondansetron ODT **OR** ondansetron    Pharmacy Consult    sodium chloride    Vital Signs  Vital Signs Patient Vitals for the past 24 hrs:   BP Temp Temp src Pulse Resp SpO2 Height Weight   24 0736 -- -- -- 62 20 92 % -- --   24 0630 110/67 -- -- 62 -- 94 % -- --   24 0600 120/63 -- -- 64 -- 91 % -- --   24 0530 123/65 -- -- 59 -- 92 % -- --   24 0500 136/57 -- -- 60 -- 93 % -- --   24 0430 125/53 -- -- 66 -- 95 % -- --    01/24/24 0400 110/69 -- -- 58 -- 92 % -- --   01/24/24 0330 122/69 98.4 °F (36.9 °C) Oral 71 20 92 % -- --   01/24/24 0324 -- -- -- 67 -- 91 % -- --   01/24/24 0323 -- -- -- 67 -- 91 % -- --   01/24/24 0259 -- -- -- 70 -- 92 % -- --   01/24/24 0245 -- -- -- 68 -- 91 % -- --   01/24/24 0230 -- -- -- 69 -- 92 % -- --   01/24/24 0215 91/63 -- -- 72 -- 92 % -- --   01/24/24 0145 117/56 -- -- 75 -- 92 % -- --   01/24/24 0130 95/57 -- -- 79 -- 91 % -- --   01/24/24 0115 99/55 -- -- 73 -- 91 % -- --   01/24/24 0100 -- -- -- 83 -- 92 % -- --   01/24/24 0045 (!) 93/35 -- -- 79 -- 91 % -- --   01/24/24 0030 -- -- -- 81 -- 93 % -- --   01/24/24 0015 97/78 -- -- 82 -- 93 % -- --   01/24/24 0000 111/77 -- -- 82 -- 94 % -- --   01/23/24 2345 90/76 -- -- 81 -- 93 % -- --   01/23/24 2330 105/47 -- -- 86 -- 96 % -- --   01/23/24 2328 -- -- -- 84 -- 99 % -- --   01/23/24 2317 99/58 -- -- 82 18 96 % -- --   01/23/24 2315 -- -- -- 86 -- 96 % -- --   01/23/24 2300 129/84 -- -- 79 -- 96 % -- --   01/23/24 2245 (!) 121/106 -- -- 79 -- 96 % -- --   01/23/24 2230 99/86 -- -- 84 -- 96 % -- --   01/23/24 2215 91/72 -- -- 82 -- 96 % -- --   01/23/24 2200 -- -- -- 90 -- 95 % -- --   01/23/24 2130 -- -- -- 75 -- 97 % -- --   01/23/24 2115 119/80 -- -- 80 -- 97 % -- --   01/23/24 2100 108/79 -- -- 85 -- 95 % -- --   01/23/24 2045 122/75 -- -- 75 -- 96 % -- --   01/23/24 2030 143/76 -- -- 77 -- 95 % -- --   01/23/24 2015 102/61 -- -- 75 -- 97 % -- --   01/23/24 2000 110/82 -- -- 80 -- 98 % -- --   01/23/24 1945 130/74 -- -- 77 -- 96 % -- --   01/23/24 1937 124/82 99.3 °F (37.4 °C) -- 75 20 97 % -- --   01/23/24 1900 -- 98.8 °F (37.1 °C) Oral 64 -- 93 % -- --   01/23/24 1745 127/76 -- -- 60 -- 99 % -- --   01/23/24 1730 145/62 -- -- 61 -- 99 % -- --   01/23/24 1715 112/41 -- -- 59 -- 100 % -- --   01/23/24 1700 112/95 -- -- 59 -- 100 % -- --   01/23/24 1645 125/56 -- -- 59 -- 99 % -- --   01/23/24 1635 112/57 -- -- 59 -- 97 % -- --   01/23/24  "1630 131/58 -- -- 59 -- 95 % -- --   01/23/24 1625 124/51 97.8 °F (36.6 °C) -- 59 -- 91 % -- --   01/23/24 1107 152/93 99 °F (37.2 °C) Oral 96 18 96 % 177.8 cm (70\") 87.4 kg (192 lb 10.9 oz)     Vital Signs (range)  Temp:  [97.8 °F (36.6 °C)-99.3 °F (37.4 °C)] 98.4 °F (36.9 °C)  Heart Rate:  [58-96] 62  Resp:  [18-20] 20  BP: ()/() 110/67  I/O:  I/O last 3 completed shifts:  In: 1680 [I.V.:1480; IV Piggyback:200]  Out: 925 [Urine:925]  I/O:   Intake/Output Summary (Last 24 hours) at 1/24/2024 0752  Last data filed at 1/24/2024 0422  Gross per 24 hour   Intake 1680 ml   Output 925 ml   Net 755 ml     BMI:  Body mass index is 27.65 kg/m².    Physical Exam:  Physical Exam     Results Review:     CBC    Results from last 7 days   Lab Units 01/24/24  0633 01/23/24  2355 01/23/24  1200   WBC 10*3/mm3 14.90* 17.06* 15.87*   HEMOGLOBIN g/dL 10.6* 12.2* 13.3   PLATELETS 10*3/mm3 241 265 325     BMP   Results from last 7 days   Lab Units 01/23/24  2355 01/23/24  1643 01/23/24  1331   SODIUM mmol/L 137 138 140   POTASSIUM mmol/L 4.2 4.0 3.8   CHLORIDE mmol/L 102 104 104   CO2 mmol/L 21.4* 22.0 24.0   BUN mg/dL 39* 36* 33*   CREATININE mg/dL 1.24 1.14 1.16   GLUCOSE mg/dL 265* 141* 115*     Cr Clearance Estimated Creatinine Clearance: 56.8 mL/min (by C-G formula based on SCr of 1.24 mg/dL).  Coag   Results from last 7 days   Lab Units 01/24/24  0633 01/23/24  2355   INR  1.08 1.03   APTT seconds 34.3 32.6     HbA1C   Lab Results   Component Value Date    HGBA1C 5.7 (H) 03/21/2019     Blood Glucose No results found for: \"POCGLU\"  Infection   Results from last 7 days   Lab Units 01/24/24  0633   PROCALCITONIN ng/mL 0.06     CMP   Results from last 7 days   Lab Units 01/23/24  2355 01/23/24  1643 01/23/24  1331   SODIUM mmol/L 137 138 140   POTASSIUM mmol/L 4.2 4.0 3.8   CHLORIDE mmol/L 102 104 104   CO2 mmol/L 21.4* 22.0 24.0   BUN mg/dL 39* 36* 33*   CREATININE mg/dL 1.24 1.14 1.16   GLUCOSE mg/dL 265* 141* 115* "   ALBUMIN g/dL  --   --  4.1   BILIRUBIN mg/dL  --   --  0.4   ALK PHOS U/L  --   --  93   AST (SGOT) U/L  --   --  10   ALT (SGPT) U/L  --   --  17     ABG      Radiology(recent) No radiology results for the last day    Assessment & Plan     Assessment & Plan      Occlusion of arterial bypass graft, initial encounter    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 male with left leg tPA thrombolysis.  I pulled the catheter back so we can place to hold graft and started him back to 2 mg/h.  Awaiting labs from this morning.  Plans for reintervention this afternoon.  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

## 2024-01-24 NOTE — ANESTHESIA POSTPROCEDURE EVALUATION
"Patient: Sohan De La Torre    Procedure Summary       Date: 01/24/24 Room / Location: [facility] OR [facility] /  [facility]    Anesthesia Start: 1523 Anesthesia Stop: 1707    Procedure: EKOS CATHETER REMOVAL, BALLOON ANGIOPLASTY LEFT SFA (Left) Diagnosis:     Surgeons: Lewis Watts II, MD Provider: Clemente Wilkinson MD    Anesthesia Type: MAC ASA Status: 4            Anesthesia Type: MAC    Vitals  Vitals Value Taken Time   /60 01/24/24 1800   Temp 36.9 °C (98.4 °F) 01/24/24 1702   Pulse 141 01/24/24 1808   Resp 16 01/24/24 1745   SpO2 90 % 01/24/24 1808   Vitals shown include unfiled device data.        Post Anesthesia Care and Evaluation    Patient location during evaluation: bedside  Patient participation: complete - patient participated  Level of consciousness: awake and alert  Pain management: adequate    Airway patency: patent  Anesthetic complications: No anesthetic complications    Cardiovascular status: acceptable  Respiratory status: acceptable  Hydration status: acceptable    Comments: /61   Pulse 67   Temp 36.9 °C (98.4 °F) (Oral)   Resp 16   Ht 177.8 cm (70\")   Wt 87.4 kg (192 lb 10.9 oz)   SpO2 96%   BMI 27.65 kg/m²     "

## 2024-01-24 NOTE — NURSING NOTE
Dr Tobar at bedside , requesting to pull Ekos catheter out 18 inches , supplies ready at bedside , dressing remove by MD, Dr Tobar explained to pt that his graft is so long that he need to move the ekos line for awhile. Dressing removed with sterile gloves site cleaned with chlorhexidine and redressed at bedside by DR Tobar, pt tolerated well , offer premedication prior to starting , pt declined .

## 2024-01-24 NOTE — PROGRESS NOTES
"  Daily Progress Note.   [facility] CORONARY CARE  1/24/2024    Patient:  Name:  Sohan De La Torre  MRN:  [id number]  1940  83 y.o.  male         CC:    HPI:  Per Dr Guerin:  \"83-year-old male with a history of peripheral vascular disease with failed left leg bypass graft, non-small cell lung cancer status post right upper lobectomy (7/31/2023), chronic obstructive pulmonary disease, hypertension, hyperlipidemia who presented to the hospital for vascular procedure and found to have occluded left femoral peroneal bypass graft status post  EKOS catheter placement.  Admitted to the ICU for further monitoring.  Continue alteplase therapy per vascular surgery, appreciate their recommendations.  Pain control with Dilaudid PCA.  Received  surgical prophylaxis antibiotics with cefazolin.  Continue DuoNeb therapy in the setting of COPD, not in acute exacerbation.  Wean SpO2 with goal 88 to 92%.  Plan for repeat procedure tomorrow with vascular surgery. \"    Interval History:  Afebrile overnight blood pressure stable.  EKOS cath remains.  No tachycardia.  No lethargy no confusion  Fluent conversation without dyspnea, some cough noted.  No acute events    Physical Exam:  /67   Pulse 62   Temp 98.4 °F (36.9 °C) (Oral)   Resp 20   Ht 177.8 cm (70\")   Wt 87.4 kg (192 lb 10.9 oz)   SpO2 94%   BMI 27.65 kg/m²   Body mass index is 27.65 kg/m².    Intake/Output Summary (Last 24 hours) at 1/24/2024 0766  Last data filed at 1/24/2024 0422  Gross per 24 hour   Intake 1680 ml   Output 925 ml   Net 755 ml     General appearance: Nontoxic, conversant   Eyes: anicteric sclerae, moist conjunctivae; no lidlag;    HENT: Atraumatic; oropharynx clear with moist mucous membranes    Neck: Trachea midline;  supple   Lungs: CTA, no wheeze no rhonchi with normal respiratory effort and no intercostal retractions  CV: RRR, no rub   Abdomen: Soft, nontender; BS+  Extremities: +ekos catheter warm distally  Skin: WWP "   Psych: Appropriate affect, alert   Neuro:   CN II-XII. Speech intact. Normal fund of knowledge    Data Review:  Notable Labs:  Results from last 7 days   Lab Units 01/24/24  0633 01/23/24  2355 01/23/24  1200   WBC 10*3/mm3 14.90* 17.06* 15.87*   HEMOGLOBIN g/dL 10.6* 12.2* 13.3   PLATELETS 10*3/mm3 241 265 325     Results from last 7 days   Lab Units 01/23/24  2355 01/23/24  1643 01/23/24  1331   SODIUM mmol/L 137 138 140   POTASSIUM mmol/L 4.2 4.0 3.8   CHLORIDE mmol/L 102 104 104   CO2 mmol/L 21.4* 22.0 24.0   BUN mg/dL 39* 36* 33*   CREATININE mg/dL 1.24 1.14 1.16   GLUCOSE mg/dL 265* 141* 115*   CALCIUM mg/dL 8.7 8.4* 9.0   Estimated Creatinine Clearance: 56.8 mL/min (by C-G formula based on SCr of 1.24 mg/dL).    Results from last 7 days   Lab Units 01/24/24  0633 01/23/24  2355 01/23/24  1331 01/23/24  1200   AST (SGOT) U/L  --   --  10  --    ALT (SGPT) U/L  --   --  17  --    PROCALCITONIN ng/mL 0.06  --   --   --    PLATELETS 10*3/mm3 241 265  --  325             Imaging:  Reviewed chest images personally from past 3 days    ASSESSMENT  /  PLAN:  Occlusion of left femoral arterial bypass graft, status post EKOS catheter placement  Leukocytosis  Hx lung ca, s/p lobectomy   COPD  Hypertension  Hyperlipidemia  Depression    Continue to monitor hemoglobin.  Request renal function panel for tomorrow a.m. patient had 1 at midnight.  Glycemic control sliding-scale insulin  Procalcitonin remains low.  On home trelegy, add symbicort, con nebs  Procal remains low  Zololft  Norvasc  Hctz  Pain control      Otherwise care per vascular surgery  EKOS catheter per vascular surgery.    All issues new to me today.  Prior hospital course, labs and imaging reviewed.      Electronically signed by Yousif Sylvester MD, 01/24/24, 7:38 AM [facility]

## 2024-01-24 NOTE — PLAN OF CARE
Goal Outcome Evaluation:  Plan of Care Reviewed With: patient                     Pt admitted for Arterial  Graft occlusion on LLE, attempt were made to remove, Ekos placed in right femoral , on Heparin, altapase , and coolant through sheath. Plan is return to OR remove ekso at 1400. DR Burger came in and pulled ekos out by 18 inches , cleaned and redressed himself gave order to increase altpase to 2 mg after withdrawal line. Pt has tolerated well with continuous dilaudid PCA.

## 2024-01-24 NOTE — CONSULTS
Group: [facility]         CONSULT NOTE    Patient Identification:  Sohan De La Torre  83 y.o.  male  1940  [id number]            Requesting physician: Dr Burger    Reason for Consultation:  ICU management, s/p EKOS thrombolysis catheter    CC: L leg critical leg ischemia, PVD with rest pain    History of Present Illness:  Sohan De La Torre is an 83 year-old male with past medical history of left lower extremity bypass graft, PVD, atherosclerosis, non-small cell lung cancer status post right upper lobectomy, COPD on home O2,  hypertension, hyperlipidemia, aortic valve stenosis, and former cigarette smoker, anxiety and depression who presented today for scheduled EKOS thrombolysis catheter placement for occluded left femoral peroneal bypass graft.  He underwent successful catheter placement in the OR with Dr. Burger today.  We were consulted for ICU management postoperatively.      Review of Systems  Constitutional: Negative for appetite change, chills, diaphoresis, fatigue, fever.  Positive for activity change.  HEENT: Negative for congestion, sinus pain, sinus pressure, sore throat, trouble swallowing, eye pain.  Respiratory: Negative for chest tightness, choking, cough, stridor, wheezing.  Positive for shortness of breath.  Cardio: Negative for chest pain, leg swelling, palpitations.  GI: Negative for abdominal distention, abdominal pain, constipation, nausea, vomiting, diarrhea.  : Negative for dysuria, frequency, flank pain, hematuria, decreased urine.  Musculoskeletal: Negative for arthralgias, back pain, joint swelling, myalgias, neck pain.  Skin: Negative for color change, pallor, rash, wound.  Neuro: Negative for headaches, dizziness, seizures, speech difficulty, syncope, tremors, weakness.  Psych: Negative for agitation, confusion, hallucinations.    Past Medical History:  Past Medical History:   Diagnosis Date    Anxiety about health     Aortic valve stenosis     Arthritis     At risk  for sleep apnea     STOP BANG SCORE 5    Atheroscler of nonbiologic bypass graft of extremity with rest pain     Cancer     lung    Depression     Femoral thrombosis     Foot pain, left     R/T PVD    History of cigarette smoking 08/19/2023    Hyperlipidemia     Hypertension     Impaired proprioception     LEFT FOOT    Ischemia of left lower extremity     Low back pain     Lung mass     UPPER RIGHT LOBE    Peripheral vascular disease 08/19/2023    PONV (postoperative nausea and vomiting)     PVD (peripheral vascular disease)     S/P partial lobectomy of lung 08/19/2023    S/P thoracentesis 08/19/2023    Spondylosis     L5-S1       Past Surgical History:  Past Surgical History:   Procedure Laterality Date    AMPUTATION DIGIT Left 02/11/2022    Procedure: LEFT THIRD TOE AMPUTATION;  Surgeon: Sohan Burger MD;  Location: [facility];  Service: Vascular;  Laterality: Left;    ANGIOPLASTY ILIAC ARTERY Left 08/28/2023    ATHRECTOMY ILIAC, FEMORAL, TIBIAL ARTERY Left 01/25/2022    Procedure: AORTO LIAC  FEMORAL LEFT POPLITEAL AND ANTERIOR TIBIAL LASER  ATHERECTOMY AND ANGIOPLASTY. LEFT FEMORAL AND POPLITEAL ARTERY STENT PLACEMENT;  Surgeon: Sohan Burger MD;  Location: [facility] 18/19;  Service: Vascular;  Laterality: Left;    ATHRECTOMY ILIAC, FEMORAL, TIBIAL ARTERY Left 06/28/2022    Procedure: AIF, Bilateral Lower Extremity Angiogram;  Surgeon: Sohan Burger MD;  Location: [facility] 18/19;  Service: Vascular;  Laterality: Left;    ATHRECTOMY ILIAC, FEMORAL, TIBIAL ARTERY Left 11/04/2022    Procedure: AORTO ILIAC FEMORAL LEFT LEG DRUG COATED BALLOON ANGIOPLASTY, LASER ATHERECTOMY, COIL EMBOLIZATION;  Surgeon: Sohan Burger MD;  Location: [facility] 18/19;  Service: Vascular;  Laterality: Left;    BRONCHOSCOPY WITH ION ROBOTIC ASSIST N/A 07/13/2023    Procedure: BRONCHOSCOPY WITH ION ROBOT UNDER FLUOROSCOPY, ENDOBRONCHIAL ULTRASOUND, FINE NEEDLE ASPIRATION, BIOPSIES, AND LAVAGE;   Surgeon: Zuhair White MD PhD;  Location: [facility] ENDOSCOPY;  Service: Robotics - Pulmonary;  Laterality: N/A;  pre: lung mass  post: lung mass    CATARACT EXTRACTION, BILATERAL      COLONOSCOPY      ENDOSCOPY      EXCISION LESION Right 12/19/2023    Procedure: EXCISION LESION of right back;  Surgeon: Mason Baltazar MD;  Location: [facility] OR;  Service: General;  Laterality: Right;    FEMORAL TIBIAL BYPASS Left 07/29/2022    Procedure: LEFT FEM PERONEAL TIBIAL BYPASS WITH INSITU GREATER SAPHENPOUS VEIN;  Surgeon: Sohan Burger MD;  Location: [facility] OR;  Service: Vascular;  Laterality: Left;    LOBECTOMY Right 07/31/2023    Procedure: BRONCHOSCOPY, THORACOSCOPY WITH RIGHT UPPER LOBECTOMY WITH [employer] ROBOT, MEDIASTINAL LYMPH NODE DISSECTION, INTERCOSTAL NERVE BLOCK;  Surgeon: Zuhair White MD PhD;  Location: [facility] OR;  Service: Robotics - [place];  Laterality: Right;    TEMPORAL ARTERY BIOPSY Right 06/20/2023    Procedure: RIGHT TEMPORAL ARTERY BIOPSY;  Surgeon: Sohan Burger MD;  Location: [facility] OR;  Service: Vascular;  Laterality: Right;    WISDOM TOOTH EXTRACTION          Home Meds:  Medications Prior to Admission   Medication Sig Dispense Refill Last Dose    amLODIPine (NORVASC) 5 MG tablet Take 2 tablets by mouth Daily.   1/23/2024 at 0600    aspirin 81 MG EC tablet Take 1 tablet by mouth Every Night. INSTRUCTED TO CONT UNTIL DOS (Patient taking differently: Take 1 tablet by mouth Every Night. INSTRUCTED BY DR BALTAZAR TO CONT UNTIL DOS- Hold dos  Indications: Disease involving Lipid Deposits in the Arteries)   Past Week    atorvastatin (LIPITOR) 40 MG tablet Take 1 tablet by mouth Every Night. Indications: High Amount of Fats in the Blood   Past Week    clopidogrel (PLAVIX) 75 MG tablet    Past Week    hydroCHLOROthiazide (HYDRODIURIL) 25 MG tablet Take 2 tablets by mouth Daily. Indications: Edema   Past Week    ibuprofen (ADVIL,MOTRIN) 600 MG tablet Take 1 tablet by  mouth Every 6 (Six) Hours As Needed. Indications: Pain   Past Month    oxyCODONE (ROXICODONE) 5 MG immediate release tablet Take 1 tablet by mouth Every 4 (Four) Hours As Needed for Moderate Pain.   Past Month    traZODone (DESYREL) 50 MG tablet Take 1-2 tablets by mouth Every Night.   Past Week    venlafaxine XR (EFFEXOR-XR) 75 MG 24 hr capsule Take 2 capsules by mouth Every Night. TAKES TWO PILLS EVERY NIGHT  Indications: Major Depressive Disorder   Past Week    ALPRAZolam (XANAX) 0.25 MG tablet Take 1 tablet by mouth 3 (Three) Times a Day As Needed. Indications: Feeling Anxious   More than a month    dilTIAZem (CARDIZEM) 30 MG tablet Take 1 tablet by mouth Every 6 (Six) Hours for 30 days. (Patient taking differently: Take 1 tablet by mouth Every 12 (Twelve) Hours. Indications: Atrial Flutter) 120 tablet 0     furosemide (Lasix) 20 MG tablet Take 1 tablet by mouth Daily for 14 days. (Patient taking differently: Take 1 tablet by mouth Daily As Needed.) 14 tablet 0     HYDROcodone-acetaminophen (Norco) 5-325 MG per tablet Take 1 tablet by mouth Every 4 (Four) Hours As Needed for Severe Pain. 20 tablet 0 Unknown    HYDROcodone-acetaminophen (NORCO) 5-325 MG per tablet Take 1 tablet by mouth Every 6 (Six) Hours As Needed for Severe Pain. 12 tablet 0 Unknown       Allergies:  No Known Allergies    Social History:   Social History     Socioeconomic History    Marital status:    Tobacco Use    Smoking status: Former     Packs/day: 0.50     Years: 20.00     Additional pack years: 0.00     Total pack years: 10.00     Types: Cigarettes     Quit date:      Years since quittin.0     Passive exposure: Past    Smokeless tobacco: Never    Tobacco comments:     25 YEARS AGO   Vaping Use    Vaping Use: Never used   Substance and Sexual Activity    Alcohol use: Not Currently     Comment: rare    Drug use: Never    Sexual activity: Defer       Family History:  Family History   Problem Relation Age of Onset    Ferdinand  "Hyperthermia Neg Hx        Physical Exam:  /82   Pulse 80   Temp 99.3 °F (37.4 °C)   Resp 20   Ht 177.8 cm (70\")   Wt 87.4 kg (192 lb 10.9 oz)   SpO2 98%   BMI 27.65 kg/m²  Body mass index is 27.65 kg/m². 98% 87.4 kg (192 lb 10.9 oz)  Physical Exam  Constitutional: Elderly male patient lying in bed, No acute respiratory distress, no accessory muscle use  Head: NCAT  Eyes: No pallor, Anicteric conjunctiva, EOMI. PERRLA.  ENMT: No oral thrush.  Moist mucous membranes.    NECK: Trachea midline, No thyromegaly, no palpable cervical lymphadenopathy  Heart: RRR, murmur noted. No pedal edema   Lungs: ZANE +, clear to auscultation throughout, no wheezes/ crackles heard    Abdomen: Soft. No tenderness, guarding or rigidity. No palpable masses  Extremities: Extremities warm and well perfused. No cyanosis/ clubbing.  Bilateral lower extremity pulses dopplerable.  Neuro: Conscious, answers appropriately, no gross focal neuro deficits  Psych: Mood and affect appropriate    PPE recommended per [facility] infectious disease Isolation protocol for the current clinical scenario(as mentioned below) was followed.    LABS:  COVID19   Date Value Ref Range Status   07/27/2022 Not Detected Not Detected - Ref. Range Final       Lab Results   Component Value Date    CALCIUM 8.4 (L) 01/23/2024     Results from last 7 days   Lab Units 01/23/24  1643 01/23/24  1331 01/23/24  1331 01/23/24  1200   SODIUM mmol/L 138  --  140  --    POTASSIUM mmol/L 4.0  --  3.8  --    CHLORIDE mmol/L 104  --  104  --    CO2 mmol/L 22.0  --  24.0  --    BUN mg/dL 36*  --  33*  --    CREATININE mg/dL 1.14  --  1.16  --    GLUCOSE mg/dL 141*   < > 115*  --    CALCIUM mg/dL 8.4*  --  9.0  --    WBC 10*3/mm3  --   --   --  15.87*   HEMOGLOBIN g/dL  --   --   --  13.3   PLATELETS 10*3/mm3  --   --   --  325   ALT (SGPT) U/L  --   --  17  --    AST (SGOT) U/L  --   --  10  --     < > = values in this interval not displayed.     Lab Results   Component " "Value Date    CKTOTAL 48 11/21/2023    TROPONINT 19 (H) 08/18/2023                                 No results found for: \"TSH\"  Estimated Creatinine Clearance: 61.8 mL/min (by C-G formula based on SCr of 1.14 mg/dL).         Imaging: I personally visualized the images of scans/x-rays performed within last 3 days.      Assessment:  Occlusion of left femoral arterial bypass graft, status post EKOS catheter placement  Leukocytosis  Hx lung ca, s/p lobectomy   COPD  Hypertension  Hyperlipidemia  Depression    Recommendations:  Left femoral arterial bypass graft occlusion  -Status post EKOS catheter placement today, continuous alteplase, heparin, coolant infusions through EKOS catheter  -Bilateral lower extremities warm, pink.  Pulses dopplerable.  -Neurovascular checks per vascular  -Tentative plan for EKOS removal tomorrow   -Regular diet for tonight, NPO at midnight  -Dilaudid PCA per Dr. Burger  -IVFs  -Fibrinogen, PTT, PT/INR every 6 hour.  Monitor closely for bleeding.    2.  Leukocytosis  -WBC 15.87, patient afebrile.  Likely reactive  -Will add procalcitonin to morning labs  -Hold on any antibiotics for now    2.  History non-small cell lung cancer  -Status post right upper lobectomy 7/31/2023    3.  COPD  -Patient states he is on Trelegy at home, has only been using for a couple weeks now.  This is not on his home medication list.  -Continue scheduled DuoNebs, patient not actively wheezing  -Patient with history of dyspnea on exertion, patient wears 1 L home O2 as needed.  Patient currently on 2 L nasal cannula, continue supplemental oxygen as needed for SpO2 goal 88 to 92%    4.  Hypertension  -Blood pressure acceptable  -Home amlodipine, HCTZ restarted.  Nicardipine available if needed    5.  Hyperlipidemia  -Home Lipitor restarted    6.  Depression  -Home Zoloft restarted  Patient was placed in face mask upon entering room and kept mask on throughout our encounter. I wore full protective equipment throughout " this patient encounter including a face mask, gown and gloves. Hand hygiene was performed before donning protective equipment and after removal when leaving the room.    Kelsy Ferreira, APRN  1/23/2024  20:46 EST      Much of this encounter note is an electronic transcription/translation of spoken language to printed text using Dragon Software.

## 2024-01-25 NOTE — OUTREACH NOTE
Prep Survey      Flowsheet Row Responses   [facility] patient discharged from? [facility]   Is LACE score < 7 ? No   Eligibility Readm Mgmt   Discharge diagnosis Occlusion of arterial bypass graft, initial encounter   Does the patient have one of the following disease processes/diagnoses(primary or secondary)? Other   Does the patient have Home health ordered? Yes   What is the Home health agency?  [facility]   Is there a DME ordered? No   Medication alerts for this patient see AVS   Prep survey completed? Yes            Paola RAY - Registered Nurse

## 2024-01-25 NOTE — PROGRESS NOTES
"  Daily Progress Note.   [facility]  1/25/2024    Patient:  Name:  Sohan De La Torre  MRN:  [id number]  1940  83 y.o.  male         CC:    HPI:  Per Dr Guerin:  \"83-year-old male with a history of peripheral vascular disease with failed left leg bypass graft, non-small cell lung cancer status post right upper lobectomy (7/31/2023), chronic obstructive pulmonary disease, hypertension, hyperlipidemia who presented to the hospital for vascular procedure and found to have occluded left femoral peroneal bypass graft status post  EKOS catheter placement.  Admitted to the ICU for further monitoring.  Continue alteplase therapy per vascular surgery, appreciate their recommendations.  Pain control with Dilaudid PCA.  Received  surgical prophylaxis antibiotics with cefazolin.  Continue DuoNeb therapy in the setting of COPD, not in acute exacerbation.  Wean SpO2 with goal 88 to 92%.  Plan for repeat procedure tomorrow with vascular surgery. \"    Interval History:  Awake conversant  Afebrile  Bp stable      Physical Exam:  /68 (BP Location: Right arm, Patient Position: Lying)   Pulse 98   Temp 98.1 °F (36.7 °C) (Oral)   Resp 18   Ht 177.8 cm (70\")   Wt 87.4 kg (192 lb 10.9 oz)   SpO2 (!) 86%   BMI 27.65 kg/m²   Body mass index is 27.65 kg/m².    Intake/Output Summary (Last 24 hours) at 1/25/2024 1345  Last data filed at 1/25/2024 0800  Gross per 24 hour   Intake 450 ml   Output 1250 ml   Net -800 ml     General appearance: Nontoxic, conversant   Eyes: anicteric sclerae, moist conjunctivae; no lidlag;    HENT: Atraumatic; oropharynx clear with moist mucous membranes    Neck: Trachea midline;  supple   Lungs: CTA, no crackles no wheeze no rhonchi with normal respiratory effort and no intercostal retractions  CV: RRR, no rub   Abdomen: Soft, nontender; BS+  Extremities:   warm distally  Skin: WWP   Psych: pressured speech, alert   Neuro:   CN II-XII. Speech intact. Normal fund of " knowledge    Data Review:  Notable Labs:  Results from last 7 days   Lab Units 01/25/24  0348 01/24/24  1829 01/24/24  0633 01/23/24  2355 01/23/24  1200   WBC 10*3/mm3 20.39*  --  14.90* 17.06* 15.87*   HEMOGLOBIN g/dL 9.2* 10.0* 10.6* 12.2* 13.3   PLATELETS 10*3/mm3 217  --  241 265 325     Results from last 7 days   Lab Units 01/25/24  0348 01/23/24  2355 01/23/24  1643 01/23/24  1331   SODIUM mmol/L 141 137 138 140   POTASSIUM mmol/L 3.9 4.2 4.0 3.8   CHLORIDE mmol/L 107 102 104 104   CO2 mmol/L 23.0 21.4* 22.0 24.0   BUN mg/dL 46* 39* 36* 33*   CREATININE mg/dL 0.99 1.24 1.14 1.16   GLUCOSE mg/dL 156* 265* 141* 115*   CALCIUM mg/dL 8.2* 8.7 8.4* 9.0   Estimated Creatinine Clearance: 71.1 mL/min (by C-G formula based on SCr of 0.99 mg/dL).    Results from last 7 days   Lab Units 01/25/24  0348 01/24/24  0633 01/23/24  2355 01/23/24  1331   AST (SGOT) U/L  --   --   --  10   ALT (SGPT) U/L  --   --   --  17   PROCALCITONIN ng/mL  --  0.06  --   --    PLATELETS 10*3/mm3 217 241 265  --              Imaging:  Reviewed chest images personally from past 3 days    ASSESSMENT  /  PLAN:  Occlusion of left femoral arterial bypass graft, status post EKOS catheter placement  Leukocytosis  Hx lung ca, s/p lobectomy   COPD  Hypertension  Hyperlipidemia  Depression    Continue to monitor hemoglobin.  Request renal function panel for tomorrow a.m. patient had 1 at midnight.  Glycemic control sliding-scale insulin    Procalcitonin remains low.  On home trelegy, add symbicort, con nebs  Cxr without change of focal infiltrate or edema.  Patient on apixaban    Zololft  Norvasc  Hctz  Pain control      Otherwise care per vascular surgery     Coordinated care with Dr Burger.  Plan of care and patient course was reviewed with multidisciplinary team in morning rounds.  Electronically signed by Yousif Sylvester MD, 01/25/24, 1:59 PM EST.

## 2024-01-25 NOTE — CONSULTS
Patient Name: Sohan De La Torre  :1940  83 y.o.    Date of Admission: 2024  Date of Consultation:  24  Encounter Provider: MAREN Almanza  Place of Service: [facility] CARDIOLOGY  Referring Provider: Sohan Burger MD  Patient Care Team:  Tee Nicole MD as PCP - General (Internal Medicine)  Gabriel Joel MD as Consulting Physician (Cardiology)  Zuhair White MD PhD as Consulting Physician (Thoracic Surgery)  Mason Baltazar MD as Surgeon (General Surgery)      Chief complaint: peripheral vascular disease    History of Present Illness: Mr. De La Torre is an 83 year old man who saw Dr. Joel in 2022 for preop clearance for peripheral vascular intervention.  He had an echo that showed moderate aortic valve stenosis and mild to moderate mitral valve stenosis. He had hyperdynamic LVEF. He had a stress test that showed no evidence of ischemia.     He was admitted in 2022 for femoral peroneal tibial bypass after failed PCI He had 2-1 AV block. He was discharged with a monitor which showed persistent complete heart block with narrow complex junctional escape and normal HR. He didn't have any symptoms.     He is here now for another intervention on his left leg.     This morning he was a little confused. Pulled out his A line. There was a lot of commotion. He said he had a panic attack. He appeared pretty short of breath.     There was some concern for heart failure. proBNP was normal. CXR was unremarkable for acute issue. EKG showed complete AV dissociation , narrow complex junctional escape. Similar to previously described.     He got 20 of IV lasix this morning.     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     Cancer     lung     Depression     Femoral thrombosis     Foot pain, left     R/T PVD    History of cigarette smoking 08/19/2023    Hyperlipidemia     Hypertension     Impaired proprioception     LEFT FOOT    Ischemia of left lower extremity     Low back pain     Lung mass     UPPER RIGHT LOBE    Peripheral vascular disease 08/19/2023    PONV (postoperative nausea and vomiting)     PVD (peripheral vascular disease)     S/P partial lobectomy of lung 08/19/2023    S/P thoracentesis 08/19/2023    Spondylosis     L5-S1       Past Surgical History:   Procedure Laterality Date    AMPUTATION DIGIT Left 02/11/2022    Procedure: LEFT THIRD TOE AMPUTATION;  Surgeon: Sohan Burger MD;  Location: [facility] MAIN OR;  Service: Vascular;  Laterality: Left;    ANGIOPLASTY ILIAC ARTERY Left 08/28/2023    ATHRECTOMY ILIAC, FEMORAL, TIBIAL ARTERY Left 01/25/2022    Procedure: AORTO LIAC  FEMORAL LEFT POPLITEAL AND ANTERIOR TIBIAL LASER  ATHERECTOMY AND ANGIOPLASTY. LEFT FEMORAL AND POPLITEAL ARTERY STENT PLACEMENT;  Surgeon: Sohan Burger MD;  Location: [facility] OR 18/19;  Service: Vascular;  Laterality: Left;    ATHRECTOMY ILIAC, FEMORAL, TIBIAL ARTERY Left 06/28/2022    Procedure: AIF, Bilateral Lower Extremity Angiogram;  Surgeon: Sohan Burger MD;  Location: [facility] OR 18/19;  Service: Vascular;  Laterality: Left;    ATHRECTOMY ILIAC, FEMORAL, TIBIAL ARTERY Left 11/04/2022    Procedure: AORTO ILIAC FEMORAL LEFT LEG DRUG COATED BALLOON ANGIOPLASTY, LASER ATHERECTOMY, COIL EMBOLIZATION;  Surgeon: Sohan Burger MD;  Location: [facility] OR 18/19;  Service: Vascular;  Laterality: Left;    BRONCHOSCOPY WITH ION ROBOTIC ASSIST N/A 07/13/2023    Procedure: BRONCHOSCOPY WITH ION ROBOT UNDER FLUOROSCOPY, ENDOBRONCHIAL ULTRASOUND, FINE NEEDLE ASPIRATION, BIOPSIES, AND LAVAGE;  Surgeon: Zuhair Whiet MD PhD;  Location: [facility] ENDOSCOPY;  Service: Robotics - Pulmonary;  Laterality: N/A;  pre: lung mass  post: lung mass    CATARACT  EXTRACTION, BILATERAL      COLONOSCOPY      EKOS CATHETER PLACEMENT N/A 1/23/2024    Procedure: EKOS CATHETER PLACEMENT;  Surgeon: Sohan Burger MD;  Location: [facility] OR;  Service: Vascular;  Laterality: N/A;    EKOS CATHETER REMOVAL Left 1/24/2024    Procedure: EKOS CATHETER REMOVAL, BALLOON ANGIOPLASTY LEFT SFA;  Surgeon: Lewis Watts II, MD;  Location: [facility] HYBRID OR;  Service: Vascular;  Laterality: Left;    ENDOSCOPY      EXCISION LESION Right 12/19/2023    Procedure: EXCISION LESION of right back;  Surgeon: Mason Chopra MD;  Location: [facility] OR;  Service: General;  Laterality: Right;    FEMORAL TIBIAL BYPASS Left 07/29/2022    Procedure: LEFT FEM PERONEAL TIBIAL BYPASS WITH INSITU GREATER SAPHENPOUS VEIN;  Surgeon: Sohan Burger MD;  Location: [facility] OR;  Service: Vascular;  Laterality: Left;    LOBECTOMY Right 07/31/2023    Procedure: BRONCHOSCOPY, THORACOSCOPY WITH RIGHT UPPER LOBECTOMY WITH DAVINCI ROBOT, MEDIASTINAL LYMPH NODE DISSECTION, INTERCOSTAL NERVE BLOCK;  Surgeon: Zuhair White MD PhD;  Location: [facility] OR;  Service: Robotics - DaVinci;  Laterality: Right;    TEMPORAL ARTERY BIOPSY Right 06/20/2023    Procedure: RIGHT TEMPORAL ARTERY BIOPSY;  Surgeon: Sohan Burger MD;  Location: [facility] OR;  Service: Vascular;  Laterality: Right;    WISDOM TOOTH EXTRACTION           Prior to Admission medications    Medication Sig Start Date End Date Taking? Authorizing Provider   amLODIPine (NORVASC) 5 MG tablet Take 2 tablets by mouth Daily.   Yes Provider, MD Dorene   aspirin 81 MG EC tablet Take 1 tablet by mouth Every Night. INSTRUCTED TO CONT UNTIL DOS  Patient taking differently: Take 1 tablet by mouth Every Night. INSTRUCTED BY DR CHOPRA TO CONT UNTIL DOS- Hold dos  Indications: Disease involving Lipid Deposits in the Arteries 6/20/23  Yes Sohan Burger MD   atorvastatin (LIPITOR) 40 MG tablet Take 1 tablet by mouth Every Night.  Indications: High Amount of Fats in the Blood 5/27/21  Yes Dorene Platt MD   clopidogrel (PLAVIX) 75 MG tablet  12/14/23  Yes Dorene Platt MD   hydroCHLOROthiazide (HYDRODIURIL) 25 MG tablet Take 2 tablets by mouth Daily. Indications: Edema 9/28/22  Yes Dorene Platt MD   ibuprofen (ADVIL,MOTRIN) 600 MG tablet Take 1 tablet by mouth Every 6 (Six) Hours As Needed. Indications: Pain   Yes Dorene Platt MD   oxyCODONE (ROXICODONE) 5 MG immediate release tablet Take 1 tablet by mouth Every 4 (Four) Hours As Needed for Moderate Pain.   Yes Dorene Platt MD   traZODone (DESYREL) 50 MG tablet Take 1-2 tablets by mouth Every Night. 9/26/23  Yes Dorene Platt MD   venlafaxine XR (EFFEXOR-XR) 75 MG 24 hr capsule Take 2 capsules by mouth Every Night. TAKES TWO PILLS EVERY NIGHT  Indications: Major Depressive Disorder 6/29/23  Yes Dorene Platt MD   ALPRAZolam (XANAX) 0.25 MG tablet Take 1 tablet by mouth 3 (Three) Times a Day As Needed. Indications: Feeling Anxious 7/21/23   Dorene Platt MD   dilTIAZem (CARDIZEM) 30 MG tablet Take 1 tablet by mouth Every 6 (Six) Hours for 30 days.  Patient taking differently: Take 1 tablet by mouth Every 12 (Twelve) Hours. Indications: Atrial Flutter 8/7/23 12/7/23  Destiney Vyas, DNP, APRN   furosemide (Lasix) 20 MG tablet Take 1 tablet by mouth Daily for 14 days.  Patient taking differently: Take 1 tablet by mouth Daily As Needed. 9/1/23 12/7/23  Zuhair White MD PhD   HYDROcodone-acetaminophen (Norco) 5-325 MG per tablet Take 1 tablet by mouth Every 4 (Four) Hours As Needed for Severe Pain. 8/28/23   Sohan Burger MD   HYDROcodone-acetaminophen (NORCO) 5-325 MG per tablet Take 1 tablet by mouth Every 6 (Six) Hours As Needed for Severe Pain. 12/19/23   Mason Baltazar MD   gemfibrozil (LOPID) 600 MG tablet Take 600 mg by mouth 2 (Two) Times a Day Before Meals.  8/3/22  Dorene Platt, MD       No  Known Allergies    Social History     Socioeconomic History    Marital status:    Tobacco Use    Smoking status: Former     Packs/day: 0.50     Years: 20.00     Additional pack years: 0.00     Total pack years: 10.00     Types: Cigarettes     Quit date:      Years since quittin.0     Passive exposure: Past    Smokeless tobacco: Never    Tobacco comments:     25 YEARS AGO   Vaping Use    Vaping Use: Never used   Substance and Sexual Activity    Alcohol use: Not Currently     Comment: rare    Drug use: Never    Sexual activity: Defer       Family History   Problem Relation Age of Onset    Malig Hyperthermia Neg Hx        REVIEW OF SYSTEMS:   All systems reviewed.  Pertinent positives identified in HPI.  All other systems are negative.      Objective:     Vitals:    24 1130 24 1152 24 1200 24 1300   BP: 95/81  138/68    BP Location:   Right arm    Patient Position:   Lying    Pulse: 88 92 98    Resp:  20 18    Temp:    98.1 °F (36.7 °C)   TempSrc:    Oral   SpO2: 95% 93% (!) 86%    Weight:       Height:         Body mass index is 27.65 kg/m².    Physical Exam:  Constitutional: He is oriented to person, place, and time. He appears well-developed. He does not appear ill.   HENT:   Head: Normocephalic and atraumatic. Head is without contusion.   Right Ear: Hearing normal. No drainage.   Left Ear: Hearing normal. No drainage.   Nose: No nasal deformity. No epistaxis.   Eyes: Lids are normal. Right eye exhibits no exudate. Left eye exhibits no exudate.  Neck: No JVD present.    Cardiovascular: Normal rate, regular rhythm and normal heart sounds.    Pulses:       Posterior tibial pulses are 2+ on the right side, and 2+ on the left side.   Pulmonary/Chest: Effort normal and breath sounds normal.   Abdominal: Soft. Normal appearance and bowel sounds are normal. There is no tenderness.   Musculoskeletal: Normal range of motion.        Right shoulder: He exhibits no deformity.        Left  shoulder: He exhibits no deformity.   Neurological: He is alert and oriented to person, place, and time. He has normal strength.   Skin: Skin is warm, dry and intact. No rash noted.   Psychiatric: He has a normal mood and affect. His behavior is normal. Thought content normal.   Vitals reviewed      Lab Review:     Results from last 7 days   Lab Units 01/25/24  0348 01/23/24  1643 01/23/24  1331   SODIUM mmol/L 141   < > 140   POTASSIUM mmol/L 3.9   < > 3.8   CHLORIDE mmol/L 107   < > 104   CO2 mmol/L 23.0   < > 24.0   BUN mg/dL 46*   < > 33*   CREATININE mg/dL 0.99   < > 1.16   CALCIUM mg/dL 8.2*   < > 9.0   BILIRUBIN mg/dL  --   --  0.4   ALK PHOS U/L  --   --  93   ALT (SGPT) U/L  --   --  17   AST (SGOT) U/L  --   --  10   GLUCOSE mg/dL 156*   < > 115*    < > = values in this interval not displayed.         Results from last 7 days   Lab Units 01/25/24  0348   WBC 10*3/mm3 20.39*   HEMOGLOBIN g/dL 9.2*   HEMATOCRIT % 28.4*   PLATELETS 10*3/mm3 217     Results from last 7 days   Lab Units 01/24/24  0633 01/23/24  2355   INR  1.08 1.03   APTT seconds 34.3 32.6                 Current Facility-Administered Medications:     acetaminophen (TYLENOL) tablet 650 mg, 650 mg, Oral, Q4H PRN, Lewis Watts II, MD    ALPRAZolam (XANAX) tablet 0.25 mg, 0.25 mg, Oral, TID PRN, Lewis Watts II, MD, 0.25 mg at 01/24/24 0438    amLODIPine (NORVASC) tablet 10 mg, 10 mg, Oral, Daily, Lewis Watts II, MD, 10 mg at 01/25/24 0827    apixaban (ELIQUIS) tablet 5 mg, 5 mg, Oral, Q12H, Sohan Burger MD, 5 mg at 01/25/24 0827    atorvastatin (LIPITOR) tablet 40 mg, 40 mg, Oral, Nightly, Lewis Watts II, MD, 40 mg at 01/24/24 2135    budesonide-formoterol (SYMBICORT) 160-4.5 MCG/ACT inhaler 2 puff, 2 puff, Inhalation, BID - RT, Lewis Watts II, MD, 2 puff at 01/25/24 0742    clopidogrel (PLAVIX) tablet 75 mg, 75 mg, Oral, Daily, Sohan Burger MD, 75 mg at 01/25/24 0827    hydroCHLOROthiazide (HYDRODIURIL) tablet 50 mg,  50 mg, Oral, Daily, Lewis Watts II, MD, 50 mg at 01/25/24 0826    ipratropium-albuterol (DUO-NEB) nebulizer solution 3 mL, 3 mL, Nebulization, 4x Daily - RT, Lewis Watts II, MD, 3 mL at 01/25/24 1152    nitroglycerin (NITROSTAT) SL tablet 0.4 mg, 0.4 mg, Sublingual, Q5 Min PRN, Lewis Watts II, MD    nitroglycerin (NITROSTAT) SL tablet 0.4 mg, 0.4 mg, Sublingual, Q5 Min PRN, Lewis Watts II, MD    ondansetron ODT (ZOFRAN-ODT) disintegrating tablet 4 mg, 4 mg, Oral, Q6H PRN **OR** ondansetron (ZOFRAN) injection 4 mg, 4 mg, Intravenous, Q6H PRN, Lewis Watts II, MD, 4 mg at 01/24/24 1642    oxyCODONE (ROXICODONE) immediate release tablet 5 mg, 5 mg, Oral, Q4H PRN, Lewis Watts II, MD    traZODone (DESYREL) tablet 100 mg, 100 mg, Oral, Nightly, Lewis Watts II, MD, 100 mg at 01/24/24 2135    venlafaxine XR (EFFEXOR-XR) 24 hr capsule 150 mg, 150 mg, Oral, Nightly, Lewis Watts II, MD, 150 mg at 01/24/24 2135    Assessment and Plan:       Active Hospital Problems    Diagnosis  POA    **Occlusion of arterial bypass graft, initial encounter [T82.898A]  Yes    Bypass graft mechanical complication, initial encounter [T82.398A]  Unknown    Peripheral vascular disease [I73.9]  Yes    Ischemia of left lower extremity [I99.8]  Yes    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     Peripheral artery disease with occlusion of left leg arterial bypass graft status post intervention POD #1  Acute SOA   Complete AV block , similar to previously described on holter 2022.  Hypertension  Aortic valve stenosis (last assessed 2022). Murmur on exam does not sound critial. He has not had any angina or syncope. Needs this re evaluated but could be done has outpatient.     He had some acute SOA this morning in the setting of a lot of commotion and panic. He was a little confused. He probably does have a little  iatrogenic volume surplus but does not appear to be in acute decompensated heart failure. He got a dose of IV lasix which should held.     He has AV dissociation which is not new and relatively asymptomatic. Will have him discharged with a 48 hr holter.     From a cardiac standpoint -- he could go today (patient is quite adamant on leaving). Will arrange 3-4 week follow up in office with an echo to reassess valvular heart disease.     Renay Castano, APRN  01/25/24  13:40 EST

## 2024-01-25 NOTE — CASE MANAGEMENT/SOCIAL WORK
Continued Stay Note  [facility]     Patient Name: Sohan De La Torre  MRN: [id number]  Today's Date: 1/25/2024    Admit Date: 1/23/2024    Plan: Home alone with [facility]   Discharge Plan       Row Name 01/25/24 1526       Plan    Plan Home alone with [facility]    Plan Comments CCP notified by manager Miryam that the nurse called and stated the patient is being discharged and needs to be set up with home health. CCP made referral to [facility] and they accepted. CD, CSW.                   Discharge Codes    No documentation.                 Expected Discharge Date and Time       Expected Discharge Date Expected Discharge Time    Jan 25, 2024

## 2024-01-25 NOTE — THERAPY EVALUATION
Patient Name: Sohan De La Torre  : 1940    MRN: [id number]                              Today's Date: 2024       Admit Date: 2024    Visit Dx: No diagnosis found.  Patient Active Problem List   Diagnosis    Congenital spondylolysis, lumbosacral region    Left lumbar radiculopathy    Essential hypertension    Hyperlipidemia    Atherosclerosis of native arteries of left leg with ulceration of other part of foot    Atherosclerosis of native artery of left lower extremity with gangrene    Atheroscler of nonbiologic bypass graft of extremity with rest pain    Ischemia of left lower extremity    Stenosis of other vascular prosthetic devices, implants and grafts, initial encounter    Failing vascular bypass graft    Temporal arteritis    Lung mass    Non-small cell lung cancer    Pleural effusion, right    S/P partial lobectomy of lung    Dyspnea on exertion    S/P thoracentesis    History of cigarette smoking    Peripheral vascular disease    Bypass graft mechanical complication, initial encounter    Occlusion of arterial bypass graft, initial encounter     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     Cancer     lung    Depression     Femoral thrombosis     Foot pain, left     R/T PVD    History of cigarette smoking 2023    Hyperlipidemia     Hypertension     Impaired proprioception     LEFT FOOT    Ischemia of left lower extremity     Low back pain     Lung mass     UPPER RIGHT LOBE    Peripheral vascular disease 2023    PONV (postoperative nausea and vomiting)     PVD (peripheral vascular disease)     S/P partial lobectomy of lung 2023    S/P thoracentesis 2023    Spondylosis     L5-S1     Past Surgical History:   Procedure Laterality Date    AMPUTATION DIGIT Left 2022    Procedure: LEFT  THIRD TOE AMPUTATION;  Surgeon: Sohan Burger MD;  Location: [facility] MAIN OR;  Service: Vascular;  Laterality: Left;    ANGIOPLASTY ILIAC ARTERY Left 08/28/2023    ATHRECTOMY ILIAC, FEMORAL, TIBIAL ARTERY Left 01/25/2022    Procedure: AORTO LIAC  FEMORAL LEFT POPLITEAL AND ANTERIOR TIBIAL LASER  ATHERECTOMY AND ANGIOPLASTY. LEFT FEMORAL AND POPLITEAL ARTERY STENT PLACEMENT;  Surgeon: Sohan Burger MD;  Location: [facility] OR 18/19;  Service: Vascular;  Laterality: Left;    ATHRECTOMY ILIAC, FEMORAL, TIBIAL ARTERY Left 06/28/2022    Procedure: AIF, Bilateral Lower Extremity Angiogram;  Surgeon: Sohan Burger MD;  Location: [facility] OR 18/19;  Service: Vascular;  Laterality: Left;    ATHRECTOMY ILIAC, FEMORAL, TIBIAL ARTERY Left 11/04/2022    Procedure: AORTO ILIAC FEMORAL LEFT LEG DRUG COATED BALLOON ANGIOPLASTY, LASER ATHERECTOMY, COIL EMBOLIZATION;  Surgeon: Sohan Burger MD;  Location: [facility] OR 18/19;  Service: Vascular;  Laterality: Left;    BRONCHOSCOPY WITH ION ROBOTIC ASSIST N/A 07/13/2023    Procedure: BRONCHOSCOPY WITH ION ROBOT UNDER FLUOROSCOPY, ENDOBRONCHIAL ULTRASOUND, FINE NEEDLE ASPIRATION, BIOPSIES, AND LAVAGE;  Surgeon: Zuhair White MD PhD;  Location: [facility] ENDOSCOPY;  Service: Robotics - Pulmonary;  Laterality: N/A;  pre: lung mass  post: lung mass    CATARACT EXTRACTION, BILATERAL      COLONOSCOPY      EKOS CATHETER PLACEMENT N/A 1/23/2024    Procedure: EKOS CATHETER PLACEMENT;  Surgeon: Sohan Burger MD;  Location: [facility] OR;  Service: Vascular;  Laterality: N/A;    EKOS CATHETER REMOVAL Left 1/24/2024    Procedure: EKOS CATHETER REMOVAL, BALLOON ANGIOPLASTY LEFT SFA;  Surgeon: Lewis Watts II, MD;  Location: [facility] OR;  Service: Vascular;  Laterality: Left;    ENDOSCOPY      EXCISION LESION Right 12/19/2023    Procedure: EXCISION LESION of right back;  Surgeon: Mason Baltazar MD;  Location: [facility] OR;  Service: General;   Laterality: Right;    FEMORAL TIBIAL BYPASS Left 07/29/2022    Procedure: LEFT FEM PERONEAL TIBIAL BYPASS WITH INSITU GREATER SAPHENPOUS VEIN;  Surgeon: Sohan Burger MD;  Location: [facility] OR;  Service: Vascular;  Laterality: Left;    LOBECTOMY Right 07/31/2023    Procedure: BRONCHOSCOPY, THORACOSCOPY WITH RIGHT UPPER LOBECTOMY WITH DAVINCI ROBOT, MEDIASTINAL LYMPH NODE DISSECTION, INTERCOSTAL NERVE BLOCK;  Surgeon: Zuhair White MD PhD;  Location: [facility] OR;  Service: Robotics - DaVinci;  Laterality: Right;    TEMPORAL ARTERY BIOPSY Right 06/20/2023    Procedure: RIGHT TEMPORAL ARTERY BIOPSY;  Surgeon: Sohan Burger MD;  Location: [facility] OR;  Service: Vascular;  Laterality: Right;    WISDOM TOOTH EXTRACTION        General Information       Row Name 01/25/24 1259          Physical Therapy Time and Intention    Document Type evaluation  -MS     Mode of Treatment physical therapy  -MS       Row Name 01/25/24 1259          General Information    Patient Profile Reviewed yes  -MS     Prior Level of Function independent:;all household mobility  access to RW, + fall history, lives alone, supportive family  -MS     Existing Precautions/Restrictions fall  -MS     Barriers to Rehab none identified  -MS       Row Name 01/25/24 1259          Living Environment    People in Home alone  -MS       Row Name 01/25/24 1259          Home Main Entrance    Number of Stairs, Main Entrance two  -MS     Stair Railings, Main Entrance railings safe and in good condition  -MS       Row Name 01/25/24 1259          Stairs Within Home, Primary    Stairs Comment, Within Home, Primary bi=level home with stairs to bedroom  -MS       Row Name 01/25/24 1259          Cognition    Orientation Status (Cognition) oriented x 4  -MS       Row Name 01/25/24 1259          Safety Issues, Functional Mobility    Safety Issues Affecting Function (Mobility) positioning of assistive device;insight into deficits/self-awareness;sequencing  abilities;awareness of need for assistance  -MS     Impairments Affecting Function (Mobility) balance;endurance/activity tolerance;strength;postural/trunk control;range of motion (ROM)  -MS     Comment, Safety Issues/Impairments (Mobility) Gait belt and non skid socks donned.  -MS               User Key  (r) = Recorded By, (t) = Taken By, (c) = Cosigned By      Initials Name Provider Type    Rebecca Li, PT Physical Therapist                   Mobility       Row Name 01/25/24 1301          Bed Mobility    Bed Mobility supine-sit  -MS     Supine-Sit [place] (Bed Mobility) standby assist;verbal cues;nonverbal cues (demo/gesture)  -MS       Row Name 01/25/24 1301          Transfers    Comment, (Transfers) Impulsive. Sequencing and safety awareness cues.  -MS       Row Name 01/25/24 1301          Sit-Stand Transfer    Sit-Stand [place] (Transfers) contact guard;minimum assist (75% patient effort);verbal cues;nonverbal cues (demo/gesture)  -MS     Assistive Device (Sit-Stand Transfers) walker, front-wheeled  -MS       Row Name 01/25/24 1301          Gait/Stairs (Locomotion)    [place] Level (Gait) contact guard;minimum assist (75% patient effort);verbal cues;nonverbal cues (demo/gesture)  -MS     Assistive Device (Gait) walker, front-wheeled  -MS     Patient was able to Ambulate yes  -MS     Distance in Feet (Gait) 120'  -MS     Deviations/Abnormal Patterns (Gait) kimberly decreased;gait speed decreased  -MS     Bilateral Gait Deviations forward flexed posture  -MS     Comment, (Gait/Stairs) No overt LOB or veering noted. Difficulty with dual task. No overt LOB but minimally unsteady with turns.  -MS               User Key  (r) = Recorded By, (t) = Taken By, (c) = Cosigned By      Initials Name Provider Type    MS AkerssRebecca, PT Physical Therapist                   Obj/Interventions       Row Name 01/25/24 1302          Range of Motion Comprehensive    General Range of Motion lower  extremity range of motion deficits identified  -MS     Comment, General Range of Motion B LEs grossly 75% WFL  -MS       Row Name 01/25/24 1302          Strength Comprehensive (MMT)    General Manual Muscle Testing (MMT) Assessment lower extremity strength deficits identified  -MS     Comment, General Manual Muscle Testing (MMT) Assessment B LEs grossly 3/5  -MS       Row Name 01/25/24 1302          Balance    Balance Assessment sitting static balance;standing static balance  -MS     Static Sitting Balance standby assist  -MS     Position, Sitting Balance sitting edge of bed  -MS     Static Standing Balance contact guard  -MS     Position/Device Used, Standing Balance supported;walker, rolling  -MS       Row Name 01/25/24 1302          Sensory Assessment (Somatosensory)    Sensory Assessment (Somatosensory) sensation intact  -MS               User Key  (r) = Recorded By, (t) = Taken By, (c) = Cosigned By      Initials Name Provider Type    MS Rebecca Kilgore, PT Physical Therapist                   Goals/Plan       Row Name 01/25/24 1305          Bed Mobility Goal 1 (PT)    Activity/Assistive Device (Bed Mobility Goal 1, PT) bed mobility activities, all  -MS     [place] Level/Cues Needed (Bed Mobility Goal 1, PT) supervision required  -MS     Time Frame (Bed Mobility Goal 1, PT) 1 week  -MS       Row Name 01/25/24 1305          Transfer Goal 1 (PT)    Activity/Assistive Device (Transfer Goal 1, PT) transfers, all  -MS     [place] Level/Cues Needed (Transfer Goal 1, PT) supervision required  -MS     Time Frame (Transfer Goal 1, PT) 1 week  -MS       Row Name 01/25/24 1305          Gait Training Goal 1 (PT)    Activity/Assistive Device (Gait Training Goal 1, PT) gait (walking locomotion)  -MS     [place] Level (Gait Training Goal 1, PT) supervision required  -MS     Distance (Gait Training Goal 1, PT) 150  -MS     Time Frame (Gait Training Goal 1, PT) 1 week  -MS       Row Name 01/25/24 1305           Therapy Assessment/Plan (PT)    Planned Therapy Interventions (PT) balance training;bed mobility training;gait training;home exercise program;postural re-education;patient/family education;ROM (range of motion);stair training;strengthening;transfer training  -MS               User Key  (r) = Recorded By, (t) = Taken By, (c) = Cosigned By      Initials Name Provider Type    MS Kilgore Rebecca MARTINEZ, PT Physical Therapist                   Clinical Impression       Row Name 01/25/24 1303          Pain    Pretreatment Pain Rating 0/10 - no pain  -MS     Posttreatment Pain Rating 0/10 - no pain  -MS       Row Name 01/25/24 1303          Therapy Assessment/Plan (PT)    Rehab Potential (PT) good, to achieve stated therapy goals  -MS     Criteria for Skilled Interventions Met (PT) yes;meets criteria  -MS     Therapy Frequency (PT) 5 times/wk  -MS       Row Name 01/25/24 1303          Vital Signs    O2 Delivery Pre Treatment supplemental O2  RN requested to doff during PT eval  -MS     O2 Delivery Intra Treatment room air  -MS     Post SpO2 (%) 98  -MS     O2 Delivery Post Treatment room air  -MS       Row Name 01/25/24 1303          Positioning and Restraints    Pre-Treatment Position in bed  -MS     Post Treatment Position bed  -MS     In Bed notified nsg;fowlers;call light within reach;encouraged to call for assist;exit alarm on  -MS               User Key  (r) = Recorded By, (t) = Taken By, (c) = Cosigned By      Initials Name Provider Type    MS KilgoreRebecca, PT Physical Therapist                   Outcome Measures       Row Name 01/25/24 1303          How much help from another person do you currently need...    Turning from your back to your side while in flat bed without using bedrails? 3  -MS     Moving from lying on back to sitting on the side of a flat bed without bedrails? 3  -MS     Moving to and from a bed to a chair (including a wheelchair)? 3  -MS     Standing up from a chair using your arms (e.g.,  wheelchair, bedside chair)? 3  -MS     Climbing 3-5 steps with a railing? 3  -MS     To walk in hospital room? 3  -MS     AM-PAC 6 Clicks Score (PT) 18  -MS     Highest Level of Mobility Goal 6 --> Walk 10 steps or more  -MS               User Key  (r) = Recorded By, (t) = Taken By, (c) = Cosigned By      Initials Name Provider Type    MS KilgoreRebecca, PT Physical Therapist                                 Physical Therapy Education       Title: PT OT SLP Therapies (Done)       Topic: Physical Therapy (Done)       Point: Mobility training (Done)       Learning Progress Summary             Patient Acceptance, E,TB, VU by MS at 1/25/2024 1305                         Point: Home exercise program (Done)       Learning Progress Summary             Patient Acceptance, E,TB, VU by MS at 1/25/2024 1305                         Point: Body mechanics (Done)       Learning Progress Summary             Patient Acceptance, E,TB, VU by MS at 1/25/2024 1305                         Point: Precautions (Done)       Learning Progress Summary             Patient Acceptance, E,TB, VU by MS at 1/25/2024 1305                                         User Key       Initials Effective Dates Name Provider Type Discipline    MS 06/16/21 -  Rebecca Kilgore PT Physical Therapist PT                  PT Recommendation and Plan  Planned Therapy Interventions (PT): balance training, bed mobility training, gait training, home exercise program, postural re-education, patient/family education, ROM (range of motion), stair training, strengthening, transfer training        Time Calculation:         PT Charges       Row Name 01/25/24 1252             Time Calculation    Start Time 1043  -MS      Stop Time 1104  -MS      Time Calculation (min) 21 min  -MS      PT Received On 01/25/24  -MS      PT - Next Appointment 01/26/24  -MS      PT Goal Re-Cert Due Date 02/01/24  -MS                User Key  (r) = Recorded By, (t) = Taken By, (c) = Cosigned By       Initials Name Provider Type    Rebecca Li, PT Physical Therapist                  Therapy Charges for Today       Code Description Service Date Service Provider Modifiers Qty    25592710214 HC PT EVAL MOD COMPLEXITY 3 1/25/2024 Rebecca Kilgore, PT GP 1    53239238097 HC PT THER PROC EA 15 MIN 1/25/2024 Rebecca Kilgore, PT GP 1            PT G-Codes  AM-PAC 6 Clicks Score (PT): 18  PT Discharge Summary  Anticipated Discharge Disposition (PT): home with home health, home with assist    Rebecca Kilgore, PT  1/25/2024

## 2024-01-25 NOTE — DISCHARGE PLACEMENT REQUEST
"Manoj De La Torre (83 y.o. Male)       Date of Birth   1940    Social Security Number       Address   [address] [place] [address]    Home Phone   [phone]    MRN   [id number]       Caodaism   Anabaptism    Marital Status                               Admission Date   1/23/24    Admission Type   Elective    Admitting Provider   Manoj Burger MD    Attending Provider   Manoj Burger MD    Department, Room/Bed   [facility], N329/1       Discharge Date       [place] [facility] [place]                                 Attending Provider: Manoj Burger MD    Allergies: No Known Allergies    Isolation: None   Infection: None   Code Status: CPR    Ht: 177.8 cm (70\")   Wt: 87.4 kg (192 lb 10.9 oz)    Admission Cmt: None   Principal Problem: Occlusion of arterial bypass graft, initial encounter [T82.898A]                   Active Insurance as of 1/23/2024       Primary Coverage       Payor Plan Insurance Group Employer/Plan Group    ANTHEM MEDICARE REPLACEMENT ANTHEM MEDICARE ADVANTAGE [id number]       Payor Plan Address Payor Plan Phone Number Payor Plan Fax Number Effective Dates    [address] [phone]  1/1/2019 - None Entered    [facility] [address]         Subscriber Name Subscriber Birth Date Member ID       MANOJ DE LA TORRE 1940 [id number]                     Emergency Contacts        (Rel.) Home Phone Work Phone Mobile Phone    Ny Shepard (Daughter) [phone] -- [phone]    Manoj De La Torre JR (Son) [phone] -- [phone]    abel kim (Friend) -- -- [phone]                "

## 2024-01-25 NOTE — PLAN OF CARE
Problem: Adult Inpatient Plan of Care  Goal: Absence of Hospital-Acquired Illness or Injury  Intervention: Identify and Manage Fall Risk  Recent Flowsheet Documentation  Taken 1/24/2024 1845 by Silvina Roman RN  Safety Promotion/Fall Prevention:   clutter free environment maintained   safety round/check completed  Intervention: Prevent Skin Injury  Recent Flowsheet Documentation  Taken 1/24/2024 1845 by Silvina Rmoan RN  Body Position: supine  Intervention: Prevent Infection  Recent Flowsheet Documentation  Taken 1/24/2024 1845 by Silvina Roman RN  Infection Prevention: single patient room provided     Goal Outcome Evaluation:    Pt back to the unit (ccu) around 1800 A & O follows command. No hematoma to the right groin site. PCA pump and moody discontinued. Due to void. Supine position for 4 hrs till 2000 pm. Start xeralto by 2100 pm. Regular diet ordered. vital stable. Plan of care ongoing.

## 2024-01-25 NOTE — PLAN OF CARE
Goal Outcome Evaluation:      Patient is a 83 y.o. male admitted to [facility] for occlusion of arterial bypass graft on 1/23/2024 now POD1 left leg tPA thrombolysis of bypass graft. Patient is independent at baseline and lives at home alone- supportive family at bedside and access to RW if needed. Today, patient performed bed mobility with SBA, required CGA-Shravan for transfers, and ambulated 120' CGA-Shravan with a RW. No overt LOB with AD use and encouraged to use upon DC. Mild balance deficits noted as well as impulsivity. Patient may benefit from skilled PT services acutely to address functional deficits as well as improve level of independence prior to discharge. Anticipate returning home with family support and possible  PT upon DC.        Anticipated Discharge Disposition (PT): home with home health, home with assist

## 2024-01-25 NOTE — PROGRESS NOTES
Name: Sohan De La Torre ADMIT: 2024   : 1940  PCP: Tee Nicole MD    MRN: [id number] LOS: 2 days   AGE/SEX: 83 y.o. male  ROOM: [address]    Billin-24, Subsequent Hospital Care, continued tPA lysis management      CC: EKOS with tPA thrombolysis day 2  Subjective     83 y.o. male with thrombosed right femoral peroneal bypass.  Successful tPA thrombolysis with graft open and good signal.  Patient is a little disoriented and short of air this morning waking up having to pee and pulling his arm out accidentally.  I have concerns for CHF.  He was given a lot of fluid over the last 48 hours.    Review of Systems Short of air and a little panic this morning but improving after stabilizing    Objective     Scheduled Medications:   amLODIPine, 10 mg, Oral, Daily  apixaban, 5 mg, Oral, Q12H  atorvastatin, 40 mg, Oral, Nightly  budesonide-formoterol, 2 puff, Inhalation, BID - RT  hydroCHLOROthiazide, 50 mg, Oral, Daily  ipratropium-albuterol, 3 mL, Nebulization, 4x Daily - RT  Scopolamine, 1 patch, Transdermal, Once  traZODone, 100 mg, Oral, Nightly  venlafaxine XR, 150 mg, Oral, Nightly        Active Infusions:  niCARdipine, 5-15 mg/hr  sodium chloride, 75 mL/hr, Last Rate: 75 mL/hr (24 0807)        As Needed Medications:    acetaminophen    ALPRAZolam    HYDROmorphone    nitroglycerin    nitroglycerin    ondansetron ODT **OR** ondansetron    oxyCODONE    Vital Signs  Vital Signs Patient Vitals for the past 24 hrs:   BP Temp Temp src Pulse Resp SpO2   24 0630 118/65 -- -- 66 -- 92 %   24 0600 109/57 -- -- 67 -- 93 %   24 0530 110/83 -- -- 71 -- 93 %   24 0500 95/53 -- -- 79 -- 96 %   24 0430 109/48 -- -- 86 -- 93 %   24 0400 132/83 -- -- 85 -- 90 %   24 0350 -- 98.6 °F (37 °C) Oral 93 -- (!) 88 %   24 0330 128/58 -- -- 67 -- 96 %   24 0300 (!) 132/102 -- -- 81 -- (!) 88 %   24 0230 119/59 -- -- 79 -- 96 %    01/25/24 0200 112/45 -- -- 70 -- 97 %   01/25/24 0130 102/54 -- -- 79 -- 96 %   01/25/24 0100 111/59 -- -- 85 -- 95 %   01/25/24 0030 133/65 -- -- 85 -- 95 %   01/25/24 0000 117/62 -- -- 86 -- 95 %   01/24/24 2338 120/54 -- -- 82 -- 92 %   01/24/24 2336 120/54 97.8 °F (36.6 °C) Oral -- 17 --   01/24/24 2330 113/84 -- -- 80 -- 91 %   01/24/24 2300 114/82 -- -- 84 -- 92 %   01/24/24 2230 93/72 -- -- 88 -- 92 %   01/24/24 2200 116/66 -- -- 93 -- 93 %   01/24/24 2130 131/66 -- -- 91 -- 90 %   01/24/24 2100 -- -- -- 85 -- 93 %   01/24/24 2030 (!) 142/128 -- -- 83 -- --   01/24/24 2000 133/70 -- -- 75 -- 90 %   01/24/24 1930 136/55 -- -- 67 -- 95 %   01/24/24 1900 118/58 -- -- 72 -- 96 %   01/24/24 1830 -- -- -- 65 -- 95 %   01/24/24 1800 131/60 98.8 °F (37.1 °C) Oral 72 16 91 %   01/24/24 1745 129/61 -- -- 67 16 96 %   01/24/24 1730 126/54 -- -- 70 16 94 %   01/24/24 1715 139/65 -- -- 67 16 96 %   01/24/24 1710 126/56 -- -- 68 16 93 %   01/24/24 1705 131/89 -- -- 70 16 91 %   01/24/24 1702 119/91 98.4 °F (36.9 °C) Oral 78 16 91 %   01/24/24 1515 122/87 -- -- 80 -- 96 %   01/24/24 1445 120/99 -- -- 73 -- 95 %   01/24/24 1430 133/68 -- -- 71 -- 95 %   01/24/24 1415 121/77 -- -- 71 -- 96 %   01/24/24 1400 136/64 -- -- 73 -- 95 %   01/24/24 1345 135/54 -- -- 65 -- 94 %   01/24/24 1330 138/72 -- -- 73 -- 94 %   01/24/24 1315 144/74 -- -- 76 -- 95 %   01/24/24 1304 130/63 98.2 °F (36.8 °C) -- 77 -- 95 %   01/24/24 1200 120/84 98.8 °F (37.1 °C) Oral 70 16 94 %   01/24/24 1121 -- -- -- 65 -- 93 %   01/24/24 1120 -- -- -- 72 20 93 %   01/24/24 1100 121/81 -- -- 61 -- 93 %   01/24/24 1000 119/67 -- -- 59 -- 93 %   01/24/24 0900 115/62 -- -- 64 -- 92 %   01/24/24 0800 129/50 98.8 °F (37.1 °C) Oral 68 18 95 %   01/24/24 0736 -- -- -- 62 20 92 %   01/24/24 0730 117/81 -- -- 66 -- 94 %     Vital Signs (range)  Temp:  [97.8 °F (36.6 °C)-98.8 °F (37.1 °C)] 98.6 °F (37 °C)  Heart Rate:  [59-93] 66  Resp:  [16-20] 17  BP:  "()/() 118/65  I/O:  I/O last 3 completed shifts:  In: 1730 [I.V.:1530; IV Piggyback:200]  Out: 2200 [Urine:2150; Blood:50]  I/O:   Intake/Output Summary (Last 24 hours) at 1/25/2024 0719  Last data filed at 1/25/2024 0400  Gross per 24 hour   Intake 450 ml   Output 1650 ml   Net -1200 ml     BMI:  Body mass index is 27.65 kg/m².    Physical Exam:  Physical Exam   Lungs coarse but equal and I do not hear rhonchi  Abdomen benign  Heart rate regular  Right groin clear  Left leg with biphasic graft signal and normal sensation    Results Review:     CBC    Results from last 7 days   Lab Units 01/25/24  0348 01/24/24  1829 01/24/24  0633 01/23/24  2355 01/23/24  1200   WBC 10*3/mm3 20.39*  --  14.90* 17.06* 15.87*   HEMOGLOBIN g/dL 9.2* 10.0* 10.6* 12.2* 13.3   PLATELETS 10*3/mm3 217  --  241 265 325     BMP   Results from last 7 days   Lab Units 01/25/24  0348 01/23/24  2355 01/23/24  1643 01/23/24  1331   SODIUM mmol/L 141 137 138 140   POTASSIUM mmol/L 3.9 4.2 4.0 3.8   CHLORIDE mmol/L 107 102 104 104   CO2 mmol/L 23.0 21.4* 22.0 24.0   BUN mg/dL 46* 39* 36* 33*   CREATININE mg/dL 0.99 1.24 1.14 1.16   GLUCOSE mg/dL 156* 265* 141* 115*     Cr Clearance Estimated Creatinine Clearance: 71.1 mL/min (by C-G formula based on SCr of 0.99 mg/dL).  Coag   Results from last 7 days   Lab Units 01/24/24 0633 01/23/24 2355   INR  1.08 1.03   APTT seconds 34.3 32.6     HbA1C   Lab Results   Component Value Date    HGBA1C 5.7 (H) 03/21/2019     Blood Glucose No results found for: \"POCGLU\"  Infection   Results from last 7 days   Lab Units 01/24/24  0633   PROCALCITONIN ng/mL 0.06     CMP   Results from last 7 days   Lab Units 01/25/24  0348 01/23/24  2355 01/23/24  1643 01/23/24  1331   SODIUM mmol/L 141 137 138 140   POTASSIUM mmol/L 3.9 4.2 4.0 3.8   CHLORIDE mmol/L 107 102 104 104   CO2 mmol/L 23.0 21.4* 22.0 24.0   BUN mg/dL 46* 39* 36* 33*   CREATININE mg/dL 0.99 1.24 1.14 1.16   GLUCOSE mg/dL 156* 265* 141* 115* "   ALBUMIN g/dL  --   --   --  4.1   BILIRUBIN mg/dL  --   --   --  0.4   ALK PHOS U/L  --   --   --  93   AST (SGOT) U/L  --   --   --  10   ALT (SGPT) U/L  --   --   --  17     ABG      Radiology(recent) No radiology results for the last day    Assessment & Plan     Assessment & Plan      Occlusion of arterial bypass graft, initial encounter    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 male with successful left leg tPA thrombolysis of bypass graft.  Patient did well overnight but had a rough morning with an accidental Arlyne pull and significant shortness of air.  Will check chest x-ray EKG and ask cardiology to confirm no CHF.  We likely volume overloaded him with all the fluid.  He still desires discharge and likely getting him home later today will be better removed and keeping him given his tendency to become belligerent.  The other concern is whether or not he could be an early withdrawal although he states he is no longer drinking at home.  Will await further testing and evaluation with physical therapy and plan for home health when discharged.    Addendum:: Patient appears to be significantly improved from a pulmonary and respiratory standpoint.  He is still a little confused but he will have family go home with him at the time of discharge.  I have explained to him the dangers of starting on Eliquis and falling and he is aware.  We will plan discharge today.  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

## 2024-01-25 NOTE — DISCHARGE SUMMARY
Name: Sohan De La Torre ADMIT: 2024   : 1940  PCP: Tee Nicole MD    MRN: [id number] LOS: 2 days   AGE/SEX: 83 y.o. 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     Cancer     lung    Depression     Femoral thrombosis     Foot pain, left     R/T PVD    History of cigarette smoking 2023    Hyperlipidemia     Hypertension     Impaired proprioception     LEFT FOOT    Ischemia of left lower extremity     Low back pain     Lung mass     UPPER RIGHT LOBE    Peripheral vascular disease 2023    PONV (postoperative nausea and vomiting)     PVD (peripheral vascular disease)     S/P partial lobectomy of lung 2023    S/P thoracentesis 2023    Spondylosis     L5-S1       CONSULTS   Consults       Date and Time Order Name Status Description    2024  7:18 AM Inpatient Cardiology Consult Completed     2024  7:30 PM Inpatient Intensivist Consult              PROCEDURES PERFORMED    Date: 2024, 2024    HOSPITAL COURSE  Patient is a 83 y.o. male presented to [facility] admitted for left leg ischemia with thrombosed femoral peroneal bypass graft.  Successful tPA thrombolysis  "performed with EKOS catheter and subsequent drug-eluting balloon angioplasty of the distal graft.  Good signals noted at completion of the patient's Viabahn leg.  He was kept overnight due to his multiple medical issues and had some shortness of air in the morning that resolved with Lasix.  His volume status was likely overloaded by the fluid we gave him.  He is overall close to baseline but does have some mild disorientation.  His family is aware and they are aware that keeping him will likely exacerbate the situation.  He wishes to be discharged and his family is agreeable.  He is agreeable to having home health and home physical therapy come in and have recommended he get his house set to avoid falls.  He will be started on Eliquis in addition to his Plavix and the Eliquis has risk with falls and he has been made aware.  At this point in time he will stop his aspirin and we will restudy his graft in 3 weeks with ABIs.  At this point in time we may be able to switch from to Eliquis and aspirin we will see how things look..  Please see the admitting history and physical for further details.  At the time of discharge she is stable afebrile tolerating diet and ambulatory with mild assistance.  His family is aware of plans for discharge and is agreeable      VITAL SIGNS  /68 (BP Location: Right arm, Patient Position: Lying)   Pulse 98   Temp 98.1 °F (36.7 °C) (Oral)   Resp 18   Ht 177.8 cm (70\")   Wt 87.4 kg (192 lb 10.9 oz)   SpO2 (!) 86%   BMI 27.65 kg/m²   Objective:  Vital signs: (most recent): Blood pressure 138/68, pulse 98, temperature 98.1 °F (36.7 °C), temperature source Oral, resp. rate 18, height 177.8 cm (70\"), weight 87.4 kg (192 lb 10.9 oz), SpO2 (!) 86%.              CONDITION ON DISCHARGE  Stable.      DISCHARGE DISPOSITION   Home-Health Care Svc      DISCHARGE MEDICATIONS     Discharge Medications        New Medications        Instructions Start Date   apixaban 5 MG tablet " tablet  Commonly known as: ELIQUIS   5 mg, Oral, Every 12 Hours Scheduled             Changes to Medications        Instructions Start Date   dilTIAZem 30 MG tablet  Commonly known as: CARDIZEM  What changed: when to take this   30 mg, Oral, Every 6 Hours Scheduled      furosemide 20 MG tablet  Commonly known as: Lasix  What changed:   when to take this  reasons to take this   20 mg, Oral, Daily             Continue These Medications        Instructions Start Date   ALPRAZolam 0.25 MG tablet  Commonly known as: XANAX   1 tablet, Oral, 3 Times Daily PRN      amLODIPine 5 MG tablet  Commonly known as: NORVASC   10 mg, Oral, Daily      atorvastatin 40 MG tablet  Commonly known as: LIPITOR   1 tablet, Oral, Nightly      clopidogrel 75 MG tablet  Commonly known as: PLAVIX       hydroCHLOROthiazide 25 MG tablet  Commonly known as: HYDRODIURIL   2 tablets, Oral, Daily      HYDROcodone-acetaminophen 5-325 MG per tablet  Commonly known as: Norco   1 tablet, Oral, Every 4 Hours PRN      HYDROcodone-acetaminophen 5-325 MG per tablet  Commonly known as: NORCO   1 tablet, Oral, Every 6 Hours PRN      ibuprofen 600 MG tablet  Commonly known as: ADVIL,MOTRIN   1 tablet, Oral, Every 6 Hours PRN      oxyCODONE 5 MG immediate release tablet  Commonly known as: ROXICODONE   5 mg, Oral, Every 4 Hours PRN      traZODone 50 MG tablet  Commonly known as: DESYREL    mg, Oral, Nightly      venlafaxine XR 75 MG 24 hr capsule  Commonly known as: EFFEXOR-XR   2 capsules, Oral, Nightly, TAKES TWO PILLS EVERY NIGHT             Stop These Medications      aspirin 81 MG EC tablet               Future Appointments   Date Time Provider Department Center   2/8/2024 10:45 AM [facility] CT [facility]   2/16/2024 10:45 AM Zuhair White MD PhD [facility]     Additional Instructions for the Follow-ups that You Need to Schedule       Discharge Follow-up with Specified Provider: Dr. Joel   As directed      To: Dr. Joel   Follow Up  Details: Follow-up for Holter        Discharge Follow-up with Specified Provider: paul; 3 Weeks   As directed      To: paul   Follow Up: 3 Weeks   Follow Up Details: Left graft scan and MARILYN in office               Follow-up Information       Tee Nicole MD .    Specialty: Internal Medicine  Contact information:  [address] [address]  [phone]                             VQI Discharge Meds  The patient is being discharged on antiplatelet therapy Yes  The patient is being discharged on Statin therapy Yes    TEST  RESULTS PENDING AT DISCHARGE      Billin, more than 30 min spent    Sohan Burger MD  Office Number [phone]    24  14:04 EST

## 2024-01-26 PROBLEM — R06.02 SOB (SHORTNESS OF BREATH): Status: ACTIVE | Noted: 2024-01-01

## 2024-01-26 PROBLEM — Z51.5 END OF LIFE CARE: Status: ACTIVE | Noted: 2024-01-01

## 2024-01-26 NOTE — DISCHARGE SUMMARY
Discharge Note   Sohan De La Torre 1940 [id number] 0     Date of Admission:1/26/2024     Date of Discharge: 01/26/24     Admission Diagnosis: SOB (shortness of breath) [R06.02]     Discharge Diagnosis:     SOB (shortness of breath)       Consults: hospice     Hospital Course: A 83 y.o. old male patient with PMH of multiple comorbidities including aortic valve stenosis sleep apnea ER peripheral vascular disease status post bypass surgery recently lung cancer hypertension hyperlipidemia presents to the hospital with complaints of altered mental status and shortness of breath.  History is from the ED attending and family members.  Patient got discharged on 25th January from [facility] after getting the bypass surgery.  Patient was admitted for left leg ischemia with thrombosed femoral peroneal bypass graft.  tPA thrombolysis was performed on an subsequent drug-eluting balloon angioplasty of the distal graft.  Patient seems to be had some shortness of breath during the hospital stay and which was resolved with Lasix.  On discharge day patient has send mild disorientation per the discharge note.  He was started on Eliquis plus Plavix.  And stop the aspirin.  Plan is to restudy his graft in 3 weeks with MARILYN.     Unfortunately patient developed shortness of breath and altered mental status on January 26 morning.  In the ED patient is found to be severe metabolic acidosis and hypoxia here needed intubation.  Family members decided to go with the patient wish which was clearly mention before to them that patient does not want any resuscitation.  Patient was extubated on 26 January in the ED.  Started on bicarb drip for metabolic acidosis.  Patient has severe anemia and family decided not to go with any blood solution and proceeding to DNR/DNI with comfort care.     All the medication adjusted to align with the goal of care for the patient. Comfort care orders are in.     DC to hospice care.      ED  course: intubated and extubate same day, HCO3 drip     Vitals:    01/26/24 1517   BP: 131/64   Pulse: 93   Resp:    Temp:    SpO2: 98%        Disposition: hospice inpatient      Discharged Condition: poor    Activity: activity as tolerated    Diet:  Diet Order   Procedures    Diet: Liquid Diets; Clear Liquid; Fluid Consistency: Thin (IDDSI 0)        Labs:    Results from last 7 days   Lab Units 01/26/24  0903 01/25/24  0348 01/24/24  1829 01/24/24  0633 01/23/24  2355 01/23/24  1200   WBC 10*3/mm3 27.50* 20.39*  --  14.90* 17.06* 15.87*   HEMOGLOBIN g/dL 5.7* 9.2* 10.0* 10.6* 12.2* 13.3   HEMATOCRIT % 19.6* 28.4* 29.6* 32.6* 36.3* 40.5   PLATELETS 10*3/mm3 182 217  --  241 265 325       Results from last 7 days   Lab Units 01/26/24  0903 01/25/24  0348 01/23/24  2355 01/23/24  1643 01/23/24  1331   SODIUM mmol/L 145 141 137 138 140   POTASSIUM mmol/L 3.7 3.9 4.2 4.0 3.8   CHLORIDE mmol/L 105 107 102 104 104   CO2 mmol/L 13.0* 23.0 21.4* 22.0 24.0   BUN mg/dL 66* 46* 39* 36* 33*   CREATININE mg/dL 1.38* 0.99 1.24 1.14 1.16                  Discharge Medications        Stop These Medications      ALPRAZolam 0.25 MG tablet  Commonly known as: XANAX     amLODIPine 5 MG tablet  Commonly known as: NORVASC     atorvastatin 40 MG tablet  Commonly known as: LIPITOR     clopidogrel 75 MG tablet  Commonly known as: PLAVIX     dilTIAZem 30 MG tablet  Commonly known as: CARDIZEM     Eliquis 5 MG tablet tablet  Generic drug: apixaban     furosemide 20 MG tablet  Commonly known as: Lasix     hydroCHLOROthiazide 25 MG tablet  Commonly known as: HYDRODIURIL     HYDROcodone-acetaminophen 5-325 MG per tablet  Commonly known as: NORCO     ibuprofen 600 MG tablet  Commonly known as: ADVIL,MOTRIN     oxyCODONE 5 MG immediate release tablet  Commonly known as: ROXICODONE     traZODone 50 MG tablet  Commonly known as: DESYREL     venlafaxine XR 75 MG 24 hr capsule  Commonly known as: EFFEXOR-XR               Spent at least 35 minutes in  the management of patient's care including but not limited to physical exam, review of vital signs, labs, cultures and imaging studies, discussing the hospital stay along with plan of care at home, preparation and coordinating of discharge, arranging follow up care and referrals as indicated. Plan also discussed with RN.    Evans Bernstein MD  Hospitalist  01/26/24   16:43 EST

## 2024-01-26 NOTE — ED NOTES
YASMINE called to submit referral. This RN spoke with [facility] representative Constanza and Constanza stated they will be I contact.

## 2024-01-26 NOTE — PROGRESS NOTES
ministered to daughter and son as they decided to move their father (pt) to comfort care in the ED. After support removed, pt was able to confirm to family that he was ready to die and that helped them to believe they made the correct decision in their father's care. After support was removed,  anointed and prayed for pt believing that death might be immanent. Pt was moved to a private room in the ED and son and daughter are at bedside. They had no other needs at this time and thanked  for his earlier help.

## 2024-01-26 NOTE — DISCHARGE PLACEMENT REQUEST
"Manoj De La Torre (83 y.o. Male)       Date of Birth   1940    Social Security Number       Address   [address] [place] [address]    Home Phone   [phone]    MRN   [id number]       Church   [employer]    Marital Status                               Admission Date   1/26/24    Admission Type   Emergency    Admitting Provider   Evans Bernstein MD    Attending Provider   Evans Bernstein MD    Department, Room/Bed   [facility] EMERGENCY DEPARTMENT, 07/07       Discharge Date       Discharge Disposition       Discharge Destination                                 Attending Provider: Evans Bernstein MD    Allergies: No Known Allergies    Isolation: None   Infection: None   Code Status: No CPR    Ht: 177.8 cm (70\")   Wt: 87.4 kg (192 lb 10.9 oz)    Admission Cmt: None   Principal Problem: SOB (shortness of breath) [R06.02]                   Active Insurance as of 1/26/2024       Primary Coverage       Payor Plan Insurance Group Employer/Plan Group    ANTHEM MEDICARE REPLACEMENT ANTHEM MEDICARE ADVANTAGE [id number]       Payor Plan Address Payor Plan Phone Number Payor Plan Fax Number Effective Dates    [address] [phone]  1/1/2019 - None Entered    [facility] [address]         Subscriber Name Subscriber Birth Date Member ID       MANOJ DE LA TORRE 1940 [id number]                     Emergency Contacts        (Rel.) Home Phone Work Phone Mobile Phone    Ny Shepard (Daughter) [phone] -- [phone]    Manoj De La Torre JR (Son) [phone] -- [phone]    abel kim (Friend) -- -- [phone]                "

## 2024-01-26 NOTE — CASE MANAGEMENT/SOCIAL WORK
Continued Stay Note   Nnamdi     Patient Name: Sohan De La Torre  MRN: [id number]  Today's Date: 1/26/2024    Admit Date: 1/26/2024    Plan: GIP [facility]   Discharge Plan       Row Name 01/26/24 1638       Plan    Plan [facility]    Patient/Family in Agreement with Plan yes    Plan Comments CM notified by [facility]ison[facility] that patient is GIP. CM notified MD that patient is GIP with [facility] and requested discharge/readmit orders. CM added floor nurse in secure chat with provider. CM emailed [facility] daily admission of patients change in status and supplied patients information.           Britney Lane RN    phone [phone]  fax [phone]

## 2024-01-26 NOTE — Clinical Note
Physician of Record for Attribution - Please select from Treatment Team: NEAL LEDEZMA [[id number]]   Review needed by CMO to determine Physician of Record: No

## 2024-01-26 NOTE — H&P
History and Physical   Sohan De La Torre : 1940 MRN:[id number] LOS:0     Reason for admission: SOB (shortness of breath)     Assessment / Plan     Patient presented with acute shortness of breath after recent surgery for left leg ischemia.  Labs significant with severe anemia and metabolic acidosis.  Probably possible GI bleeding in the setting of taking Eliquis and Plavix after the surgery.  Family decided to go for DNR/DNI with comfort care.  Comfort care measures are started.  Continue with morphine antianxiety medication respiratory treatment oxygen supplement as needed to make patient as comfortable as possible.  Bicarb drip to be discontinued.  No blood transfusion.    Hospice care is consulted -  to inform.         Level Of Support Discussed With: Next of Kin (If No Surrogate)  Code Status (Patient has no pulse and is not breathing): No CPR (Do Not Attempt to Resuscitate)  Medical Interventions (Patient has pulse or is breathing): Comfort Measures       Nutrition: Diet: Liquid Diets; Clear Liquid; Fluid Consistency: Thin (IDDSI 0)     DVT Prophylaxis:   Mechanical Order History:       None          Pharmalogical Order History:       None             History of Present illness     A 83 y.o. old male patient with PMH of multiple comorbidities including aortic valve stenosis sleep apnea ER peripheral vascular disease status post bypass surgery recently lung cancer hypertension hyperlipidemia presents to the hospital with complaints of altered mental status and shortness of breath.  History is from the ED attending and family members.  Patient got discharged on  from [facility] after getting the bypass surgery.  Patient was admitted for left leg ischemia with thrombosed femoral peroneal bypass graft.  tPA thrombolysis was performed on an subsequent drug-eluting balloon angioplasty of the distal graft.  Patient seems to be had some shortness of breath during the hospital stay  and which was resolved with Lasix.  On discharge day patient has send mild disorientation per the discharge note.  He was started on Eliquis plus Plavix.  And stop the aspirin.  Plan is to restudy his graft in 3 weeks with MARILYN.    Unfortunately patient developed shortness of breath and altered mental status on January 26 morning.  In the ED patient is found to be severe metabolic acidosis and hypoxia here needed intubation.  Family members decided to go with the patient wish which was clearly mention before to them that patient does not want any resuscitation.  Patient was extubated on 26 January in the ED.  Started on bicarb drip for metabolic acidosis.  Patient has severe anemia and family decided not to go with any blood solution and proceeding to DNR/DNI with comfort care.    All the medication adjusted to align with the goal of care for the patient. Comfort care orders are in.     ED course: intubated and extubate same day, HCO3 drip     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     Cancer     lung    Depression     Femoral thrombosis     Foot pain, left     R/T PVD    History of cigarette smoking 08/19/2023    Hyperlipidemia     Hypertension     Impaired proprioception     LEFT FOOT    Ischemia of left lower extremity     Low back pain     Lung mass     UPPER RIGHT LOBE    Peripheral vascular disease 08/19/2023    PONV (postoperative nausea and vomiting)     PVD (peripheral vascular disease)     S/P partial lobectomy of lung 08/19/2023    S/P thoracentesis 08/19/2023    Spondylosis     L5-S1      Past Surgical History:   Procedure Laterality Date    AMPUTATION DIGIT Left 02/11/2022    Procedure: LEFT THIRD  TOE AMPUTATION;  Surgeon: Sohan Burger MD;  Location: [facility] MAIN OR;  Service: Vascular;  Laterality: Left;    ANGIOPLASTY ILIAC ARTERY Left 08/28/2023    ATHRECTOMY ILIAC, FEMORAL, TIBIAL ARTERY Left 01/25/2022    Procedure: AORTO LIAC  FEMORAL LEFT POPLITEAL AND ANTERIOR TIBIAL LASER  ATHERECTOMY AND ANGIOPLASTY. LEFT FEMORAL AND POPLITEAL ARTERY STENT PLACEMENT;  Surgeon: Sohan Burger MD;  Location: [facility] OR 18/19;  Service: Vascular;  Laterality: Left;    ATHRECTOMY ILIAC, FEMORAL, TIBIAL ARTERY Left 06/28/2022    Procedure: AIF, Bilateral Lower Extremity Angiogram;  Surgeon: Sohan Burger MD;  Location: [facility] OR 18/19;  Service: Vascular;  Laterality: Left;    ATHRECTOMY ILIAC, FEMORAL, TIBIAL ARTERY Left 11/04/2022    Procedure: AORTO ILIAC FEMORAL LEFT LEG DRUG COATED BALLOON ANGIOPLASTY, LASER ATHERECTOMY, COIL EMBOLIZATION;  Surgeon: Sohan Burger MD;  Location: [facility] OR 18/19;  Service: Vascular;  Laterality: Left;    BRONCHOSCOPY WITH ION ROBOTIC ASSIST N/A 07/13/2023    Procedure: BRONCHOSCOPY WITH ION ROBOT UNDER FLUOROSCOPY, ENDOBRONCHIAL ULTRASOUND, FINE NEEDLE ASPIRATION, BIOPSIES, AND LAVAGE;  Surgeon: Zuhair White MD PhD;  Location: [facility] ENDOSCOPY;  Service: Robotics - Pulmonary;  Laterality: N/A;  pre: lung mass  post: lung mass    CATARACT EXTRACTION, BILATERAL      COLONOSCOPY      EKOS CATHETER PLACEMENT N/A 1/23/2024    Procedure: EKOS CATHETER PLACEMENT;  Surgeon: Sohan Burger MD;  Location: [facility] OR;  Service: Vascular;  Laterality: N/A;    EKOS CATHETER REMOVAL Left 1/24/2024    Procedure: EKOS CATHETER REMOVAL, BALLOON ANGIOPLASTY LEFT SFA;  Surgeon: Lewis Watts II, MD;  Location: [facility] OR;  Service: Vascular;  Laterality: Left;    ENDOSCOPY      EXCISION LESION Right 12/19/2023    Procedure: EXCISION LESION of right back;  Surgeon: Mason Baltazar MD;  Location: [facility] MAIN OR;  Service: General;  Laterality:  Right;    FEMORAL TIBIAL BYPASS Left 2022    Procedure: LEFT FEM PERONEAL TIBIAL BYPASS WITH INSITU GREATER SAPHENPOUS VEIN;  Surgeon: Sohan Burger MD;  Location: [facility] MAIN OR;  Service: Vascular;  Laterality: Left;    LOBECTOMY Right 2023    Procedure: BRONCHOSCOPY, THORACOSCOPY WITH RIGHT UPPER LOBECTOMY WITH DAVINCI ROBOT, MEDIASTINAL LYMPH NODE DISSECTION, INTERCOSTAL NERVE BLOCK;  Surgeon: Zuhair White MD PhD;  Location: [facility] OR;  Service: Robotics - DaVinci;  Laterality: Right;    TEMPORAL ARTERY BIOPSY Right 2023    Procedure: RIGHT TEMPORAL ARTERY BIOPSY;  Surgeon: Sohan Burger MD;  Location: [facility] MAIN OR;  Service: Vascular;  Laterality: Right;    WISDOM TOOTH EXTRACTION        Social History     Socioeconomic History    Marital status:    Tobacco Use    Smoking status: Former     Packs/day: 0.50     Years: 20.00     Additional pack years: 0.00     Total pack years: 10.00     Types: Cigarettes     Quit date:      Years since quittin.0     Passive exposure: Past    Smokeless tobacco: Never    Tobacco comments:     25 YEARS AGO   Vaping Use    Vaping Use: Never used   Substance and Sexual Activity    Alcohol use: Not Currently     Comment: rare    Drug use: Never    Sexual activity: Defer      Family History   Problem Relation Age of Onset    Malig Hyperthermia Neg Hx       No Known Allergies     Home Medications     Prior to Admission medications    Medication Sig Start Date End Date Taking? Authorizing Provider   ALPRAZolam (XANAX) 0.25 MG tablet Take 1 tablet by mouth 3 (Three) Times a Day As Needed. Indications: Feeling Anxious 23   ProviderDorene MD   amLODIPine (NORVASC) 5 MG tablet Take 2 tablets by mouth Daily.    Provider, MD Dorene   apixaban (ELIQUIS) 5 MG tablet tablet Take 1 tablet by mouth Every 12 (Twelve) Hours. 24   Sohan Burger MD   atorvastatin (LIPITOR) 40 MG tablet Take 1 tablet by mouth Every Night.  Indications: High Amount of Fats in the Blood 5/27/21   Dorene Platt MD   clopidogrel (PLAVIX) 75 MG tablet  12/14/23   Dorene Platt MD   dilTIAZem (CARDIZEM) 30 MG tablet Take 1 tablet by mouth Every 6 (Six) Hours for 30 days.  Patient taking differently: Take 1 tablet by mouth Every 12 (Twelve) Hours. Indications: Atrial Flutter 8/7/23 12/7/23  Destiney Vyas, PATTI, APRN   furosemide (Lasix) 20 MG tablet Take 1 tablet by mouth Daily for 14 days.  Patient taking differently: Take 1 tablet by mouth Daily As Needed. 9/1/23 12/7/23  Zuhair White MD PhD   hydroCHLOROthiazide (HYDRODIURIL) 25 MG tablet Take 2 tablets by mouth Daily. Indications: Edema 9/28/22   Dorene Platt MD   HYDROcodone-acetaminophen (Norco) 5-325 MG per tablet Take 1 tablet by mouth Every 4 (Four) Hours As Needed for Severe Pain. 8/28/23   Sohan Burger MD   HYDROcodone-acetaminophen (NORCO) 5-325 MG per tablet Take 1 tablet by mouth Every 6 (Six) Hours As Needed for Severe Pain. 12/19/23   Mason Baltazar MD   ibuprofen (ADVIL,MOTRIN) 600 MG tablet Take 1 tablet by mouth Every 6 (Six) Hours As Needed. Indications: Pain    Dorene Platt MD   oxyCODONE (ROXICODONE) 5 MG immediate release tablet Take 1 tablet by mouth Every 4 (Four) Hours As Needed for Moderate Pain.    Dorene Platt MD   traZODone (DESYREL) 50 MG tablet Take 1-2 tablets by mouth Every Night. 9/26/23   Dorene Platt MD   venlafaxine XR (EFFEXOR-XR) 75 MG 24 hr capsule Take 2 capsules by mouth Every Night. TAKES TWO PILLS EVERY NIGHT  Indications: Major Depressive Disorder 6/29/23   Dorene Platt MD   gemfibrozil (LOPID) 600 MG tablet Take 600 mg by mouth 2 (Two) Times a Day Before Meals.  8/3/22  Dorene Platt MD      Objective / Physical Exam   Vital signs:  Temp: 96.4 °F (35.8 °C)  BP: (!) 81/39  Heart Rate: 71  Resp: (!) 33  SpO2: 97 %  Weight: 87.4 kg (192 lb 10.9 oz)    Admission Weight: Weight:  87.4 kg (192 lb 10.9 oz)    Physical Exam   Physical Exam  HENT:      Head: Normocephalic and atraumatic.      Nose: Nose normal.   Eyes:      Extraocular Movements: Extraocular movements intact.      Conjunctivae/sclera: Conjunctivae normal.      Pupils: Pupils are equal, round, and reactive to light.   Cardiovascular:      NR   Pulmonary:   Bilateral rhonchi present  Abdominal:      General: Abdomen is flat. Bowel sounds are normal.      Palpations: Abdomen is soft.   Musculoskeletal:         General: Normal range of motion.      Cervical back: Normal range of motion and neck supple.   Skin:     General: Skin is dry.   Neurological:      General: No focal deficit present.      Mental Status: confused and moving moving around on the bed, trying to talking to the son on the bedside       Labs     Results from last 7 days   Lab Units 01/26/24  0903 01/25/24  0348 01/24/24  1829 01/24/24  0633 01/23/24  2355 01/23/24  1200   WBC 10*3/mm3 27.50* 20.39*  --  14.90* 17.06* 15.87*   HEMATOCRIT % 19.6* 28.4* 29.6* 32.6* 36.3* 40.5   PLATELETS 10*3/mm3 182 217  --  241 265 325      Results from last 7 days   Lab Units 01/26/24  0903 01/25/24  0348 01/23/24  2355 01/23/24  1643 01/23/24  1331   SODIUM mmol/L 145 141 137 138 140   POTASSIUM mmol/L 3.7 3.9 4.2 4.0 3.8   CHLORIDE mmol/L 105 107 102 104 104   CO2 mmol/L 13.0* 23.0 21.4* 22.0 24.0   BUN mg/dL 66* 46* 39* 36* 33*   CREATININE mg/dL 1.38* 0.99 1.24 1.14 1.16            Current Medications   Scheduled Meds:sodium chloride, 10 mL, Intravenous, Q12H         Continuous Infusions:      Evans Bernstein MD  [facility]   01/26/24   11:07 EST

## 2024-01-26 NOTE — ED PROVIDER NOTES
Subjective   History of Present Illness  Patient is an 83-year-old male who apparently called EMS due to respiratory distress.  Upon arrival patient had poor respiratory effort.  Patient was unable to offer further complaints due to his respiratory status.      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     Cancer     lung    Depression     Femoral thrombosis     Foot pain, left     R/T PVD    History of cigarette smoking 08/19/2023    Hyperlipidemia     Hypertension     Impaired proprioception     LEFT FOOT    Ischemia of left lower extremity     Low back pain     Lung mass     UPPER RIGHT LOBE    Peripheral vascular disease 08/19/2023    PONV (postoperative nausea and vomiting)     PVD (peripheral vascular disease)     S/P partial lobectomy of lung 08/19/2023    S/P thoracentesis 08/19/2023    Spondylosis     L5-S1       No Known Allergies    Past Surgical History:   Procedure Laterality Date    AMPUTATION DIGIT Left 02/11/2022    Procedure: LEFT THIRD TOE AMPUTATION;  Surgeon: Sohan Burger MD;  Location: [facility] OR;  Service: Vascular;  Laterality: Left;    ANGIOPLASTY ILIAC ARTERY Left 08/28/2023    ATHRECTOMY ILIAC, FEMORAL, TIBIAL ARTERY Left 01/25/2022    Procedure: AORTO LIAC  FEMORAL LEFT POPLITEAL AND ANTERIOR TIBIAL LASER  ATHERECTOMY AND ANGIOPLASTY. LEFT FEMORAL AND POPLITEAL ARTERY STENT PLACEMENT;  Surgeon: Sohan Burger MD;  Location: [facility] OR 18/19;  Service: Vascular;  Laterality: Left;    ATHRECTOMY ILIAC, FEMORAL, TIBIAL ARTERY Left 06/28/2022    Procedure: AIF, Bilateral Lower Extremity Angiogram;  Surgeon: Sohan Burger MD;  Location: [facility] OR 18/19;  Service: Vascular;  Laterality: Left;    ATHRECTOMY ILIAC, FEMORAL, TIBIAL ARTERY Left 11/04/2022    Procedure: AORTO  ILIAC FEMORAL LEFT LEG DRUG COATED BALLOON ANGIOPLASTY, LASER ATHERECTOMY, COIL EMBOLIZATION;  Surgeon: Sohan Burger MD;  Location: [facility] HYBRID OR 18/19;  Service: Vascular;  Laterality: Left;    BRONCHOSCOPY WITH ION ROBOTIC ASSIST N/A 07/13/2023    Procedure: BRONCHOSCOPY WITH ION ROBOT UNDER FLUOROSCOPY, ENDOBRONCHIAL ULTRASOUND, FINE NEEDLE ASPIRATION, BIOPSIES, AND LAVAGE;  Surgeon: Zuhair White MD PhD;  Location: [facility] ENDOSCOPY;  Service: Robotics - Pulmonary;  Laterality: N/A;  pre: lung mass  post: lung mass    CATARACT EXTRACTION, BILATERAL      COLONOSCOPY      EKOS CATHETER PLACEMENT N/A 1/23/2024    Procedure: EKOS CATHETER PLACEMENT;  Surgeon: Sohan Burger MD;  Location: [facility] HYBRID OR;  Service: Vascular;  Laterality: N/A;    EKOS CATHETER REMOVAL Left 1/24/2024    Procedure: EKOS CATHETER REMOVAL, BALLOON ANGIOPLASTY LEFT SFA;  Surgeon: Lewis Watts II, MD;  Location: [facility] HYBRID OR;  Service: Vascular;  Laterality: Left;    ENDOSCOPY      EXCISION LESION Right 12/19/2023    Procedure: EXCISION LESION of right back;  Surgeon: Mason Baltazar MD;  Location: [facility] OR;  Service: General;  Laterality: Right;    FEMORAL TIBIAL BYPASS Left 07/29/2022    Procedure: LEFT FEM PERONEAL TIBIAL BYPASS WITH INSITU GREATER SAPHENPOUS VEIN;  Surgeon: Sohan Burger MD;  Location: [facility] MAIN OR;  Service: Vascular;  Laterality: Left;    LOBECTOMY Right 07/31/2023    Procedure: BRONCHOSCOPY, THORACOSCOPY WITH RIGHT UPPER LOBECTOMY WITH [employer] ROBOT, MEDIASTINAL LYMPH NODE DISSECTION, INTERCOSTAL NERVE BLOCK;  Surgeon: Zuhair White MD PhD;  Location: [facility] MAIN OR;  Service: Robotics - DaVinci;  Laterality: Right;    TEMPORAL ARTERY BIOPSY Right 06/20/2023    Procedure: RIGHT TEMPORAL ARTERY BIOPSY;  Surgeon: Sohan Burger MD;  Location: [facility] MAIN OR;  Service: Vascular;  Laterality: Right;    WISDOM TOOTH EXTRACTION         Family History   Problem Relation Age of  Onset    Malig Hyperthermia Neg Hx        Social History     Socioeconomic History    Marital status:    Tobacco Use    Smoking status: Former     Packs/day: 0.50     Years: 20.00     Additional pack years: 0.00     Total pack years: 10.00     Types: Cigarettes     Quit date:      Years since quittin.0     Passive exposure: Past    Smokeless tobacco: Never    Tobacco comments:     25 YEARS AGO   Vaping Use    Vaping Use: Never used   Substance and Sexual Activity    Alcohol use: Not Currently     Comment: rare    Drug use: Never    Sexual activity: Defer           Objective   Physical Exam  General exam shows the patient be in respiratory distress.  Neurologic exam show the patient is tender without focal deficit.  Neck adenopathy JVD or bruits.  Lungs were distant and symmetrical.  Heart has regular rate rhythm without murmur.  Abdomen is soft with no tenderness.  Extremities exam had digital cyanosis without edema.  Rectal exam is heme positive with black stools.  Procedures     My EKG interpretation showed normal sinus rhythm at a rate of 66 with no acute ST change  Patient was orally intubated with 7.5 endotracheal without difficulty.  Patient symmetrical breath sounds on Ambu bag ventilation.  Chest x-ray confirms proper placement.    ED Course      Results for orders placed or performed during the hospital encounter of 24   Respiratory Panel PCR w/COVID-19(SARS-CoV-2) EMILY/CARLENE/ADELAIDA/PAD/COR/VASHTI In-House, NP Swab in UTM/VTM, 2 HR TAT - Swab, Nasopharynx    Specimen: Nasopharynx; Swab   Result Value Ref Range    ADENOVIRUS, PCR Not Detected Not Detected    Coronavirus 229E Not Detected Not Detected    Coronavirus HKU1 Not Detected Not Detected    Coronavirus NL63 Not Detected Not Detected    Coronavirus OC43 Not Detected Not Detected    COVID19 Not Detected Not Detected - Ref. Range    Human Metapneumovirus Not Detected Not Detected    Human Rhinovirus/Enterovirus Not Detected Not Detected     Influenza A PCR Not Detected Not Detected    Influenza B PCR Not Detected Not Detected    Parainfluenza Virus 1 Not Detected Not Detected    Parainfluenza Virus 2 Not Detected Not Detected    Parainfluenza Virus 3 Not Detected Not Detected    Parainfluenza Virus 4 Not Detected Not Detected    RSV, PCR Not Detected Not Detected    Bordetella pertussis pcr Not Detected Not Detected    Bordetella parapertussis PCR Not Detected Not Detected    Chlamydophila pneumoniae PCR Not Detected Not Detected    Mycoplasma pneumo by PCR Not Detected Not Detected   Comprehensive Metabolic Panel    Specimen: Arm, Left; Blood   Result Value Ref Range    Glucose 313 (H) 65 - 99 mg/dL    BUN 66 (H) 8 - 23 mg/dL    Creatinine 1.38 (H) 0.76 - 1.27 mg/dL    Sodium 145 136 - 145 mmol/L    Potassium 3.7 3.5 - 5.2 mmol/L    Chloride 105 98 - 107 mmol/L    CO2 13.0 (L) 22.0 - 29.0 mmol/L    Calcium 7.9 (L) 8.6 - 10.5 mg/dL    Total Protein 4.2 (L) 6.0 - 8.5 g/dL    Albumin 2.8 (L) 3.5 - 5.2 g/dL    ALT (SGPT) 13 1 - 41 U/L    AST (SGOT) 19 1 - 40 U/L    Alkaline Phosphatase 52 39 - 117 U/L    Total Bilirubin 0.2 0.0 - 1.2 mg/dL    Globulin 1.4 gm/dL    A/G Ratio 2.0 g/dL    BUN/Creatinine Ratio 47.8 (H) 7.0 - 25.0    Anion Gap 27.0 (H) 5.0 - 15.0 mmol/L    eGFR 51.1 (L) >60.0 mL/min/1.73   Urinalysis With Microscopic If Indicated (No Culture) - Urine, Catheter    Specimen: Urine, Catheter   Result Value Ref Range    Color, UA Yellow Yellow, Straw    Appearance, UA Clear Clear    pH, UA 6.5 5.0 - 8.0    Specific Gravity, UA 1.015 1.005 - 1.030    Glucose, UA Negative Negative    Ketones, UA Negative Negative    Bilirubin, UA Negative Negative    Blood, UA Moderate (2+) (A) Negative    Protein, UA Negative Negative    Leuk Esterase, UA Negative Negative    Nitrite, UA Negative Negative    Urobilinogen, UA 1.0 E.U./dL 0.2 - 1.0 E.U./dL   Blood Gas, Arterial -    Specimen: Arterial Blood   Result Value Ref Range    Site Right Radial     Colton's  Test Positive     pH, Arterial 6.914 (C) 7.350 - 7.450 pH units    pCO2, Arterial 45.8 35.0 - 48.0 mm Hg    pO2, Arterial 443.8 (H) 83.0 - 108.0 mm Hg    HCO3, Arterial 9.3 (L) 21.0 - 28.0 mmol/L    Base Excess, Arterial -21.3 (L) 0.0 - 3.0 mmol/L    O2 Saturation, Arterial 99.9 (H) 94.0 - 98.0 %    CO2 Content 10.7 (L) 22 - 29 mmol/L    Barometric Pressure for Blood Gas      Modality Adult Vent     FIO2 100 %    Ventilator Mode AC     Set Tidal Volume 450     PEEP 5     Hemodilution No     Respiratory Rate 12    BNP    Specimen: Arm, Left; Blood   Result Value Ref Range    proBNP 1,003.0 0.0 - 1,800.0 pg/mL   D-dimer, Quantitative    Specimen: Arm, Left; Blood   Result Value Ref Range    D-Dimer, Quantitative 3.35 (H) 0.00 - 0.82 mg/L (FEU)   High Sensitivity Troponin T    Specimen: Arm, Left; Blood   Result Value Ref Range    HS Troponin T 42 (H) <22 ng/L   CBC Auto Differential    Specimen: Arm, Left; Blood   Result Value Ref Range    WBC 27.50 (H) 3.40 - 10.80 10*3/mm3    RBC 2.01 (L) 4.14 - 5.80 10*6/mm3    Hemoglobin 5.7 (C) 13.0 - 17.7 g/dL    Hematocrit 19.6 (C) 37.5 - 51.0 %    MCV 97.5 (H) 79.0 - 97.0 fL    MCH 28.1 26.6 - 33.0 pg    MCHC 28.8 (L) 31.5 - 35.7 g/dL    RDW 15.3 12.3 - 15.4 %    RDW-SD 55.6 (H) 37.0 - 54.0 fl    MPV 10.2 6.0 - 12.0 fL    Platelets 182 140 - 450 10*3/mm3   Scan Slide    Specimen: Arm, Left; Blood   Result Value Ref Range    Scan Slide     Urinalysis, Microscopic Only - Urine, Catheter    Specimen: Urine, Catheter   Result Value Ref Range    RBC, UA 21-50 (A) None Seen, 0-2 /HPF    WBC, UA 0-2 None Seen, 0-2 /HPF    Bacteria, UA None Seen None Seen /HPF    Squamous Epithelial Cells, UA 0-2 None Seen, 0-2 /HPF    Hyaline Casts, UA None Seen None Seen /LPF    Methodology Automated Microscopy    Manual Differential    Specimen: Arm, Left; Blood   Result Value Ref Range    Neutrophil % 65.0 42.7 - 76.0 %    Lymphocyte % 25.0 19.6 - 45.3 %    Monocyte % 5.0 5.0 - 12.0 %     Eosinophil % 2.0 0.3 - 6.2 %    Bands %  3.0 0.0 - 5.0 %    Neutrophils Absolute 18.70 (H) 1.70 - 7.00 10*3/mm3    Lymphocytes Absolute 6.88 (H) 0.70 - 3.10 10*3/mm3    Monocytes Absolute 1.38 (H) 0.10 - 0.90 10*3/mm3    Eosinophils Absolute 0.55 (H) 0.00 - 0.40 10*3/mm3    Anisocytosis Slight/1+ None Seen    Crenated RBC's Slight/1+ None Seen    Poikilocytes Slight/1+ None Seen    WBC Morphology Normal Normal    Platelet Morphology Normal Normal   POC Glucose Once    Specimen: Blood   Result Value Ref Range    Glucose 275 (H) 70 - 105 mg/dL   ECG 12 Lead Dyspnea   Result Value Ref Range    QT Interval 498 ms    QTC Interval 521 ms     XR Chest 1 View    Result Date: 1/26/2024  Impression: 1. Emphysema with postoperative changes of the right lung and stable elevation of the right hemidiaphragm. A 2 cm right apical pneumothorax is suspected, and CT of the chest is recommended for further assessment. 2. Endotracheal tube 3 cm above the bruno. Findings were reported to Dr. Garcia on 1/16/24 at 10:00 am EST. Electronically Signed: Paola Underwood MD  1/26/2024 10:06 AM EST  Workstation ID: [id number]    XR Chest 1 View    Result Date: 1/25/2024  Postoperative changes in the right related to right upper lobectomy with volume loss and chronic elevation right hemidiaphragm. No evidence for active disease in the chest.  This report was finalized on 1/25/2024 7:58 AM by Dr. Husam Evangelista M.D on Workstation: [id number]                                            Medical Decision Making  Chest x-ray interpretation shows endotracheal to be in proper position.  Respiratory panel is negative.  BMP shows acute kidney injury with BUN of 66 creatinine 1.38.  Glucose was 313.  LFTs were at baseline with no evidence of hepatitis.  UA had no evidence of UTI.  Patient has a leukocytosis with white count 27.5 thousand.  Patient has hemoglobin of 5.7 with hematocrit 19.6.  pH is 6.9 with pCO2 of 45 pO2 of 443 will follow patient was on the  ventilator.  BNP was 1000 D-dimer 3.3.  Patient's family did arrive to the ED.  They state that the patient is a DO NOT INTUBATE DO NOT RESUSCITATE.  After further discussion with family they request that the patient be extubated.  They state they want to stop all care.  They do not want to transfuse the patient they only want to provide comfort care measures as well.  I did speak to the on-call hospitalist.  Total critical care time 50 minutes.  This is exclusive of endotracheal intubation time.    Amount and/or Complexity of Data Reviewed  Labs: ordered. Decision-making details documented in ED Course.  Radiology: ordered and independent interpretation performed.  ECG/medicine tests: ordered and independent interpretation performed.    Risk  Prescription drug management.  Parenteral controlled substances.  Drug therapy requiring intensive monitoring for toxicity.  Decision regarding hospitalization.  Decision not to resuscitate or to de-escalate care because of poor prognosis.        Final diagnoses:   Acute respiratory failure with hypoxia   Hypotension, unspecified hypotension type   Metabolic acidosis   Acute kidney injury   Melena   Severe anemia       ED Disposition  ED Disposition       ED Disposition   Decision to Admit    Condition   --    Comment   --               No follow-up provider specified.       Medication List        Changed      dilTIAZem 30 MG tablet  Commonly known as: CARDIZEM  Take 1 tablet by mouth Every 6 (Six) Hours for 30 days.  What changed: when to take this     furosemide 20 MG tablet  Commonly known as: Lasix  Take 1 tablet by mouth Daily for 14 days.  What changed:   when to take this  reasons to take this                 Jesús Garcia MD  01/26/24 1037

## 2024-01-26 NOTE — H&P
History and Physical   Sohan De La Torre : 1940 MRN:[id number] LOS:0     Reason for admission: SOB (shortness of breath)     Assessment / Plan     Patient presented with acute shortness of breath after recent surgery for left leg ischemia.  Labs significant with severe anemia and metabolic acidosis.  Probably possible GI bleeding in the setting of taking Eliquis and Plavix after the surgery.  Family decided to go for DNR/DNI with comfort care.  Comfort care measures are started.  Continue with morphine antianxiety medication respiratory treatment oxygen supplement as needed to make patient as comfortable as possible.  Bicarb drip to be discontinued.  No blood transfusion.    Hospice care is consulted - and now admitted under the hospice care IP.         Level Of Support Discussed With: Next of Kin (If No Surrogate)  Code Status (Patient has no pulse and is not breathing): No CPR (Do Not Attempt to Resuscitate)  Medical Interventions (Patient has pulse or is breathing): Comfort Measures       Nutrition: Diet: Liquid Diets; Clear Liquid; Fluid Consistency: Thin (IDDSI 0)     DVT Prophylaxis:   Mechanical Order History:       None          Pharmalogical Order History:       None             History of Present illness     A 83 y.o. old male patient with PMH of multiple comorbidities including aortic valve stenosis sleep apnea ER peripheral vascular disease status post bypass surgery recently lung cancer hypertension hyperlipidemia presents to the hospital with complaints of altered mental status and shortness of breath.  History is from the ED attending and family members.  Patient got discharged on  from [facility] after getting the bypass surgery.  Patient was admitted for left leg ischemia with thrombosed femoral peroneal bypass graft.  tPA thrombolysis was performed on an subsequent drug-eluting balloon angioplasty of the distal graft.  Patient seems to be had some shortness of  breath during the hospital stay and which was resolved with Lasix.  On discharge day patient has send mild disorientation per the discharge note.  He was started on Eliquis plus Plavix.  And stop the aspirin.  Plan is to restudy his graft in 3 weeks with MARILYN.    Unfortunately patient developed shortness of breath and altered mental status on January 26 morning.  In the ED patient is found to be severe metabolic acidosis and hypoxia here needed intubation.  Family members decided to go with the patient wish which was clearly mention before to them that patient does not want any resuscitation.  Patient was extubated on 26 January in the ED.  Started on bicarb drip for metabolic acidosis.  Patient has severe anemia and family decided not to go with any blood solution and proceeding to DNR/DNI with comfort care.    All the medication adjusted to align with the goal of care for the patient. Comfort care orders are in.     now admitted under the hospice care IP.       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     Cancer     lung    Depression     Femoral thrombosis     Foot pain, left     R/T PVD    History of cigarette smoking 08/19/2023    Hyperlipidemia     Hypertension     Impaired proprioception     LEFT FOOT    Ischemia of left lower extremity     Low back pain     Lung mass     UPPER RIGHT LOBE    Peripheral vascular disease 08/19/2023    PONV (postoperative nausea and vomiting)     PVD (peripheral vascular disease)     S/P partial lobectomy of lung 08/19/2023    S/P thoracentesis 08/19/2023    Spondylosis     L5-S1      Past Surgical History:   Procedure Laterality Date    AMPUTATION DIGIT Left 02/11/2022     Procedure: LEFT THIRD TOE AMPUTATION;  Surgeon: Sohna Burger MD;  Location: [facility] MAIN OR;  Service: Vascular;  Laterality: Left;    ANGIOPLASTY ILIAC ARTERY Left 08/28/2023    ATHRECTOMY ILIAC, FEMORAL, TIBIAL ARTERY Left 01/25/2022    Procedure: AORTO LIAC  FEMORAL LEFT POPLITEAL AND ANTERIOR TIBIAL LASER  ATHERECTOMY AND ANGIOPLASTY. LEFT FEMORAL AND POPLITEAL ARTERY STENT PLACEMENT;  Surgeon: Sohan Burger MD;  Location: [facility] OR 18/19;  Service: Vascular;  Laterality: Left;    ATHRECTOMY ILIAC, FEMORAL, TIBIAL ARTERY Left 06/28/2022    Procedure: AIF, Bilateral Lower Extremity Angiogram;  Surgeon: Sohan Burger MD;  Location: [facility] OR 18/19;  Service: Vascular;  Laterality: Left;    ATHRECTOMY ILIAC, FEMORAL, TIBIAL ARTERY Left 11/04/2022    Procedure: AORTO ILIAC FEMORAL LEFT LEG DRUG COATED BALLOON ANGIOPLASTY, LASER ATHERECTOMY, COIL EMBOLIZATION;  Surgeon: Sohan Burger MD;  Location: [facility] OR 18/19;  Service: Vascular;  Laterality: Left;    BRONCHOSCOPY WITH ION ROBOTIC ASSIST N/A 07/13/2023    Procedure: BRONCHOSCOPY WITH ION ROBOT UNDER FLUOROSCOPY, ENDOBRONCHIAL ULTRASOUND, FINE NEEDLE ASPIRATION, BIOPSIES, AND LAVAGE;  Surgeon: Zuhair White MD PhD;  Location: [facility] ENDOSCOPY;  Service: Robotics - Pulmonary;  Laterality: N/A;  pre: lung mass  post: lung mass    CATARACT EXTRACTION, BILATERAL      COLONOSCOPY      EKOS CATHETER PLACEMENT N/A 1/23/2024    Procedure: EKOS CATHETER PLACEMENT;  Surgeon: Sohan Burger MD;  Location: [facility] OR;  Service: Vascular;  Laterality: N/A;    EKOS CATHETER REMOVAL Left 1/24/2024    Procedure: EKOS CATHETER REMOVAL, BALLOON ANGIOPLASTY LEFT SFA;  Surgeon: Lewis Watts II, MD;  Location: [facility] OR;  Service: Vascular;  Laterality: Left;    ENDOSCOPY      EXCISION LESION Right 12/19/2023    Procedure: EXCISION LESION of right back;  Surgeon: Mason Baltazar MD;  Location: [facility] OR;  Service:  General;  Laterality: Right;    FEMORAL TIBIAL BYPASS Left 2022    Procedure: LEFT FEM PERONEAL TIBIAL BYPASS WITH INSITU GREATER SAPHENPOUS VEIN;  Surgeon: Sohan Burger MD;  Location: [facility] OR;  Service: Vascular;  Laterality: Left;    LOBECTOMY Right 2023    Procedure: BRONCHOSCOPY, THORACOSCOPY WITH RIGHT UPPER LOBECTOMY WITH DAVINCI ROBOT, MEDIASTINAL LYMPH NODE DISSECTION, INTERCOSTAL NERVE BLOCK;  Surgeon: Zuhair White MD PhD;  Location: [facility] MAIN OR;  Service: Robotics - DaVinci;  Laterality: Right;    TEMPORAL ARTERY BIOPSY Right 2023    Procedure: RIGHT TEMPORAL ARTERY BIOPSY;  Surgeon: Sohan Burger MD;  Location: [facility] MAIN OR;  Service: Vascular;  Laterality: Right;    WISDOM TOOTH EXTRACTION        Social History     Socioeconomic History    Marital status:    Tobacco Use    Smoking status: Former     Packs/day: 0.50     Years: 20.00     Additional pack years: 0.00     Total pack years: 10.00     Types: Cigarettes     Quit date:      Years since quittin.0     Passive exposure: Past    Smokeless tobacco: Never    Tobacco comments:     25 YEARS AGO   Vaping Use    Vaping Use: Never used   Substance and Sexual Activity    Alcohol use: Not Currently     Comment: rare    Drug use: Never    Sexual activity: Defer      Family History   Problem Relation Age of Onset    Malig Hyperthermia Neg Hx       No Known Allergies     Home Medications     Prior to Admission medications    Medication Sig Start Date End Date Taking? Authorizing Provider   ALPRAZolam (XANAX) 0.25 MG tablet Take 1 tablet by mouth 3 (Three) Times a Day As Needed. Indications: Feeling Anxious 23   Provider, MD Dorene   amLODIPine (NORVASC) 5 MG tablet Take 2 tablets by mouth Daily.    Provider, MD Dorene   apixaban (ELIQUIS) 5 MG tablet tablet Take 1 tablet by mouth Every 12 (Twelve) Hours. 24   Sohan Burger MD   atorvastatin (LIPITOR) 40 MG tablet Take 1 tablet by  mouth Every Night. Indications: High Amount of Fats in the Blood 5/27/21   Dorene Platt MD   clopidogrel (PLAVIX) 75 MG tablet  12/14/23   Dorene Platt MD   dilTIAZem (CARDIZEM) 30 MG tablet Take 1 tablet by mouth Every 6 (Six) Hours for 30 days.  Patient taking differently: Take 1 tablet by mouth Every 12 (Twelve) Hours. Indications: Atrial Flutter 8/7/23 12/7/23  Destiney Vyas DNP, APRN   furosemide (Lasix) 20 MG tablet Take 1 tablet by mouth Daily for 14 days.  Patient taking differently: Take 1 tablet by mouth Daily As Needed. 9/1/23 12/7/23  Zuhair White MD PhD   hydroCHLOROthiazide (HYDRODIURIL) 25 MG tablet Take 2 tablets by mouth Daily. Indications: Edema 9/28/22   Dorene Platt MD   HYDROcodone-acetaminophen (Norco) 5-325 MG per tablet Take 1 tablet by mouth Every 4 (Four) Hours As Needed for Severe Pain. 8/28/23   Sohan Burger MD   HYDROcodone-acetaminophen (NORCO) 5-325 MG per tablet Take 1 tablet by mouth Every 6 (Six) Hours As Needed for Severe Pain. 12/19/23   Mason Baltazar MD   ibuprofen (ADVIL,MOTRIN) 600 MG tablet Take 1 tablet by mouth Every 6 (Six) Hours As Needed. Indications: Pain    Dorene Platt MD   oxyCODONE (ROXICODONE) 5 MG immediate release tablet Take 1 tablet by mouth Every 4 (Four) Hours As Needed for Moderate Pain.    Dorene Platt MD   traZODone (DESYREL) 50 MG tablet Take 1-2 tablets by mouth Every Night. 9/26/23   Dorene Platt MD   venlafaxine XR (EFFEXOR-XR) 75 MG 24 hr capsule Take 2 capsules by mouth Every Night. TAKES TWO PILLS EVERY NIGHT  Indications: Major Depressive Disorder 6/29/23   Dorene Platt MD   gemfibrozil (LOPID) 600 MG tablet Take 600 mg by mouth 2 (Two) Times a Day Before Meals.  8/3/22  Dorene Platt MD      Objective / Physical Exam   Vital signs:  Temp: 96.4 °F (35.8 °C)  BP: 131/64  Heart Rate: 93  Resp: 28  SpO2: 98 %  Weight: 87.4 kg (192 lb 10.9 oz)    Admission  Weight: Weight: 87.4 kg (192 lb 10.9 oz)    Physical Exam   Physical Exam  HENT:      Head: Normocephalic and atraumatic.      Nose: Nose normal.   Eyes:      Extraocular Movements: Extraocular movements intact.      Conjunctivae/sclera: Conjunctivae normal.      Pupils: Pupils are equal, round, and reactive to light.   Cardiovascular:      NR   Pulmonary:   Bilateral rhonchi present  Abdominal:      General: Abdomen is flat. Bowel sounds are normal.      Palpations: Abdomen is soft.   Musculoskeletal:         General: Normal range of motion.      Cervical back: Normal range of motion and neck supple.   Skin:     General: Skin is dry.   Neurological:      General: No focal deficit present.      Mental Status: confused and moving moving around on the bed, trying to talking to the son on the bedside       Labs     Results from last 7 days   Lab Units 01/26/24  0903 01/25/24  0348 01/24/24  1829 01/24/24  0633 01/23/24  2355 01/23/24  1200   WBC 10*3/mm3 27.50* 20.39*  --  14.90* 17.06* 15.87*   HEMATOCRIT % 19.6* 28.4* 29.6* 32.6* 36.3* 40.5   PLATELETS 10*3/mm3 182 217  --  241 265 325      Results from last 7 days   Lab Units 01/26/24  0903 01/25/24  0348 01/23/24  2355 01/23/24  1643 01/23/24  1331   SODIUM mmol/L 145 141 137 138 140   POTASSIUM mmol/L 3.7 3.9 4.2 4.0 3.8   CHLORIDE mmol/L 105 107 102 104 104   CO2 mmol/L 13.0* 23.0 21.4* 22.0 24.0   BUN mg/dL 66* 46* 39* 36* 33*   CREATININE mg/dL 1.38* 0.99 1.24 1.14 1.16            Current Medications   Scheduled Meds:Morphine, 4 mg, Intravenous, Q4H  sodium chloride, 10 mL, Intravenous, Q12H         Continuous Infusions:      Evans Bernstein MD  [facility]   01/26/24   16:47 EST

## 2024-01-26 NOTE — CASE MANAGEMENT/SOCIAL WORK
Discharge Planning Assessment  [facility]     Patient Name: Sohan De La Torre  MRN: [id number]  Today's Date: 1/26/2024    Admit Date: 1/26/2024    Plan: Comfort measures, [facility] consult pending eval    Continued Care and Services - Admitted Since 1/26/2024       Destination       Service Provider Request Status Selected Services Address Phone Fax Patient Preferred    [facility] Accepted N/A [address] [facility] [address] [phone] --    [facility] Pending - Request Sent N/A [address] [facility] [address] [phone] --                   Patient Forms       Row Name 01/26/24 1623       Patient Forms    Important Message from Medicare (IMM) --  IMM 1/26 per registration           Britney Lane RN    phone [phone]  fax [phone]

## 2024-01-27 NOTE — CASE MANAGEMENT/SOCIAL WORK
Continued Stay Note  [facility]     Patient Name: Sohan De La Torre  MRN: [id number]  Today's Date: 1/27/2024    Admit Date: 1/26/2024    Plan: GIP [facility]   Discharge Plan       Row Name 01/27/24 1603       Plan    Plan GIP [facility]    Plan Comments CM noted that pt had not been switched in system for new encounter as GIP.  Emails sent at 8171 to [facility] daily admission.  response received that they were working on it.                      Expected Discharge Date and Time       Expected Discharge Date Expected Discharge Time    Jan 26, 2024               Vivian Roger RN

## 2024-01-27 NOTE — PROGRESS NOTES
"[facility] MEDICINE SERVICE  DAILY PROGRESS NOTE    NAME: Sohan De La Torre  : 1940  MRN: [id number]      LOS: 1 day     PROVIDER OF SERVICE: Sami Richards MD    Chief Complaint: SOB (shortness of breath)     SUBJECTIVE     Patient is resting comfortably.  Case discussed with his daughter this morning.      Continue comfort care measures.        OBJECTIVE   /68   Pulse 93   Temp 97.5 °F (36.4 °C) (Axillary)   Resp 26   Ht 177.8 cm (70\")   Wt 87.4 kg (192 lb 10.9 oz)   SpO2 98%   BMI 27.65 kg/m²         Scheduled Meds   midazolam, 2 mg, Intravenous, Q4H  Morphine, 5 mg, Intravenous, Q4H  sodium chloride, 10 mL, Intravenous, Q12H       PRN Meds     acetaminophen **OR** acetaminophen **OR** acetaminophen    carboxymethylcellulose sod    diphenhydrAMINE **OR** diphenhydrAMINE    diphenoxylate-atropine    ipratropium-albuterol    Lidocaine Viscous HCl    LORazepam **OR** LORazepam **OR** LORazepam    midazolam    Morphine    OLANZapine (zyPREXA) 2.5 mg in sterile water (preservative free) 0.5 mL injection    ondansetron ODT **OR** ondansetron    Scopolamine    [COMPLETED] Insert Peripheral IV **AND** sodium chloride    sodium chloride    sodium chloride    sodium chloride   Infusions         EXAM:    General: NAD  HEENT: Normocephalic, atraumatic  Neck: Supple, No JVD  CV: Regular rate and rhythm, S1 and S2 are normal, no murmurs/rubs/or gallops  Lungs: Clear to auscultation bilaterally, no rales/rhonchi/wheezes  Abdomen: Soft, non-distended, non-tender, bowel sounds present  EXT: No edema of lower extremities  Neuro: Cranial nerves II through XII intact      Medications reviewed: yes.  Labs reviewed: yes.    Result Review:  I have personally reviewed the results from the time of this admission to 2024 13:49 EST and agree with these findings:  [x]  Laboratory  []  Microbiology  [x]  Radiology  []  EKG/Telemetry   []  Cardiology/Vascular   []  Pathology  []  Old records  []  " Other:      ASSESSMENT/PLAN     Patient presented with acute shortness of breath after recent surgery for left leg ischemia.  Labs significant with severe anemia and metabolic acidosis.  Probably possible GI bleeding in the setting of taking Eliquis and Plavix after the surgery.  Family decided to go for DNR/DNI with comfort care measures on 1/26.  Comfort care measures to be continued.   Continue with morphine and versed.       Hospice care was consulted - he is now admitted under the hospice care IP.     Code: DNR and DNI on comfort care measures

## 2024-01-27 NOTE — PLAN OF CARE
Problem: Adult Inpatient Plan of Care  Goal: Absence of Hospital-Acquired Illness or Injury  Intervention: Identify and Manage Fall Risk  Recent Flowsheet Documentation  Taken 1/27/2024 0200 by Jesús Babb RN  Safety Promotion/Fall Prevention: safety round/check completed  Taken 1/27/2024 0000 by Jesús Babb RN  Safety Promotion/Fall Prevention: safety round/check completed  Taken 1/26/2024 2000 by Jesús Babb RN  Safety Promotion/Fall Prevention:   safety round/check completed   room organization consistent   nonskid shoes/slippers when out of bed   assistive device/personal items within reach   fall prevention program maintained  Intervention: Prevent Infection  Recent Flowsheet Documentation  Taken 1/26/2024 2000 by Jesús Babb, RN  Infection Prevention:   single patient room provided   visitors restricted/screened   environmental surveillance performed   personal protective equipment utilized  Goal: Readiness for Transition of Care  Intervention: Mutually Develop Transition Plan  Recent Flowsheet Documentation  Taken 1/26/2024 2202 by Jesús Babb, RN  Equipment Currently Used at Home:   walker, rolling   cane, straight     Problem: End-of-Life Care  Goal: Comfort, Peace and Preserved Dignity  Intervention: Support the Grieving Process  Recent Flowsheet Documentation  Taken 1/26/2024 2000 by Jesús Babb, RN  Family/Support System Care: caregiver stress acknowledged   Goal Outcome Evaluation:  Pt comfort care. Family at bedside. No new issues addressed. Will continue to monitor.

## 2024-01-27 NOTE — OUTREACH NOTE
Medical Week 1 Survey      Flowsheet Row Responses   [facility] patient discharged from? [facility]   Does the patient have one of the following disease processes/diagnoses(primary or secondary)? Other   Week 1 attempt successful? No   Unsuccessful attempts Attempt 1   Revoke Readmitted            GIDEON MARTINEZ - Registered Nurse

## 2024-01-27 NOTE — NURSING NOTE
Patient very restless this morning. Spoke with family and they would like to wait to do an assessment on the patient once he gets settled down. Olanzapine IM was added to MAR and given. Patient is still restless. Discussed with family at bedside.

## 2024-01-27 NOTE — PLAN OF CARE
Goal Outcome Evaluation:      Patient very restless this morning. Medications changed to keep patient comfortable. Discussed with family at bedside. No complaints at this time.

## 2024-01-28 NOTE — CASE MANAGEMENT/SOCIAL WORK
Case Management Discharge Note      Final Note: [facility]    Provided Post Acute Provider List?: N/A  Provided Post Acute Provider Quality & Resource List?: N/A    Selected Continued Care - Discharged on 1/27/2024 Admission date: 1/26/2024 - Discharge disposition: Hospice/Medical Facility (DC - External)      Destination Coordination complete.      Service Provider Selected Services Address Phone Fax Patient Preferred    [facility] [address] [facility] [place] [id number] [phone] --                      Final Discharge Disposition Code: 51 - hospice medical facility

## 2024-01-28 NOTE — CASE MANAGEMENT/SOCIAL WORK
Case Management Discharge Note      Final Note:          Selected Continued Care - Discharged on 2024 Admission date: 2024 - Discharge disposition:  in Medical Facility      Destination    No services have been selected for the patient.                Durable Medical Equipment    No services have been selected for the patient.                Dialysis/Infusion    No services have been selected for the patient.                Home Medical Care    No services have been selected for the patient.                Therapy    No services have been selected for the patient.                Community Resources    No services have been selected for the patient.                Community & DME    No services have been selected for the patient.                    Selected Continued Care - Prior Encounters Includes continued care and service providers with selected services from prior encounters from 10/29/2023 to 2024      Discharged on 2024 Admission date: 2024 - Discharge disposition: Hospice/Medical Facility (DC - External)      Destination       Service Provider Selected Services Address Phone Fax Patient Preferred    [facility] [address] [place] [id number] [phone] --                               Final Discharge Disposition Code: 41 -  in medical facility

## 2024-01-29 NOTE — CASE MANAGEMENT/SOCIAL WORK
Case Management Discharge Note      Final Note: Pt is          Selected Continued Care - Discharged on 2024 Admission date: 2024 - Discharge disposition: Home-Health Care Svc      Destination    No services have been selected for the patient.                Durable Medical Equipment    No services have been selected for the patient.                Dialysis/Infusion    No services have been selected for the patient.                Home Medical Care    No services have been selected for the patient.                Therapy    No services have been selected for the patient.                Community Resources    No services have been selected for the patient.                Community & DME    No services have been selected for the patient.                         Final Discharge Disposition Code: 41 -  in medical facility

## 2024-01-31 NOTE — H&P
[facility] Medicine Services  History & Physical        Patient Name: Sohan De La Torre  : 1940  MRN: [id number]  Primary Care Physician:  Tee Nicole MD  Date of admission: 2024  Date of Service: 2024       ATTENDING PHYSICIAN    No att. providers found        CHIEF COMPLAINT    End of life care    HPI    Patient presented with acute shortness of breath after recent surgery for left leg ischemia.  Labs significant with severe anemia and metabolic acidosis.  Probably possible GI bleeding in the setting of taking Eliquis and Plavix after the surgery.  Family decided to go for DNR/DNI with comfort care.  Comfort care measures are started.  Continue with morphine antianxiety medication respiratory treatment oxygen supplement as needed to make patient as comfortable as possible.  Bicarb drip to be discontinued.  No blood transfusion.     Hospice care is consulted - and now admitted under the hospice care IP.            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     Cancer     lung    Depression     Femoral thrombosis     Foot pain, left     R/T PVD    History of cigarette smoking 2023    Hyperlipidemia     Hypertension     Impaired proprioception     LEFT FOOT    Ischemia of left lower extremity     Low back pain     Lung mass     UPPER RIGHT LOBE    Peripheral vascular disease 2023    PONV (postoperative nausea and vomiting)     PVD (peripheral vascular disease)     S/P partial lobectomy of lung 2023    S/P thoracentesis 2023    Spondylosis     L5-S1       SURGICAL HISTORY    Past Surgical History:   Procedure Laterality Date    AMPUTATION DIGIT Left 2022    Procedure: LEFT THIRD TOE AMPUTATION;  Surgeon: Sohan Burger MD;  Location[facility]  OR;  Service: Vascular;  Laterality: Left;    ANGIOPLASTY ILIAC ARTERY Left 08/28/2023    ATHRECTOMY ILIAC, FEMORAL, TIBIAL ARTERY Left 01/25/2022    Procedure: AORTO LIAC  FEMORAL LEFT POPLITEAL AND ANTERIOR TIBIAL LASER  ATHERECTOMY AND ANGIOPLASTY. LEFT FEMORAL AND POPLITEAL ARTERY STENT PLACEMENT;  Surgeon: Sohan Burger MD;  Location: [facility] OR 18/19;  Service: Vascular;  Laterality: Left;    ATHRECTOMY ILIAC, FEMORAL, TIBIAL ARTERY Left 06/28/2022    Procedure: AIF, Bilateral Lower Extremity Angiogram;  Surgeon: Sohan Burger MD;  Location: [facility] OR 18/19;  Service: Vascular;  Laterality: Left;    ATHRECTOMY ILIAC, FEMORAL, TIBIAL ARTERY Left 11/04/2022    Procedure: AORTO ILIAC FEMORAL LEFT LEG DRUG COATED BALLOON ANGIOPLASTY, LASER ATHERECTOMY, COIL EMBOLIZATION;  Surgeon: Sohan Burger MD;  Location: [facility] OR 18/19;  Service: Vascular;  Laterality: Left;    BRONCHOSCOPY WITH ION ROBOTIC ASSIST N/A 07/13/2023    Procedure: BRONCHOSCOPY WITH ION ROBOT UNDER FLUOROSCOPY, ENDOBRONCHIAL ULTRASOUND, FINE NEEDLE ASPIRATION, BIOPSIES, AND LAVAGE;  Surgeon: Zuhair White MD PhD;  Location: [facility] ENDOSCOPY;  Service: Robotics - Pulmonary;  Laterality: N/A;  pre: lung mass  post: lung mass    CATARACT EXTRACTION, BILATERAL      COLONOSCOPY      EKOS CATHETER PLACEMENT N/A 1/23/2024    Procedure: EKOS CATHETER PLACEMENT;  Surgeon: Sohan Burger MD;  Location: [facility] OR;  Service: Vascular;  Laterality: N/A;    EKOS CATHETER REMOVAL Left 1/24/2024    Procedure: EKOS CATHETER REMOVAL, BALLOON ANGIOPLASTY LEFT SFA;  Surgeon: Lewis Watts II, MD;  Location: [facility] OR;  Service: Vascular;  Laterality: Left;    ENDOSCOPY      EXCISION LESION Right 12/19/2023    Procedure: EXCISION LESION of right back;  Surgeon: Mason Baltazar MD;  Location: [facility] OR;  Service: General;  Laterality: Right;    FEMORAL TIBIAL BYPASS Left 07/29/2022    Procedure: LEFT FEM  PERONEAL TIBIAL BYPASS WITH INSITU GREATER SAPHENPOUS VEIN;  Surgeon: Sohan Burger MD;  Location: [facility] MAIN OR;  Service: Vascular;  Laterality: Left;    LOBECTOMY Right 2023    Procedure: BRONCHOSCOPY, THORACOSCOPY WITH RIGHT UPPER LOBECTOMY WITH DAVINCI ROBOT, MEDIASTINAL LYMPH NODE DISSECTION, INTERCOSTAL NERVE BLOCK;  Surgeon: Zuhair White MD PhD;  Location: [facility] MAIN OR;  Service: Robotics - DaVinci;  Laterality: Right;    TEMPORAL ARTERY BIOPSY Right 2023    Procedure: RIGHT TEMPORAL ARTERY BIOPSY;  Surgeon: Sohan Burger MD;  Location: [facility] MAIN OR;  Service: Vascular;  Laterality: Right;    WISDOM TOOTH EXTRACTION         FAMILY HISTORY    Family History   Problem Relation Age of Onset    Malig Hyperthermia Neg Hx        SOCIAL HISTORY    Social History     Socioeconomic History    Marital status:    Tobacco Use    Smoking status: Former     Packs/day: 0.50     Years: 20.00     Additional pack years: 0.00     Total pack years: 10.00     Types: Cigarettes     Quit date:      Years since quittin.0     Passive exposure: Past    Smokeless tobacco: Never    Tobacco comments:     25 YEARS AGO   Vaping Use    Vaping Use: Never used   Substance and Sexual Activity    Alcohol use: Not Currently     Comment: rare    Drug use: Never    Sexual activity: Defer       CURRENT MEDICATIONS    No outpatient medications have been marked as taking for the 24 encounter (Hospital Encounter).     No medications prior to admission.       ALLERGIES    No Known Allergies    REVIEW OF SYSTEMS    All systems have been reviewed and reported as negative unless otherwise stated in the interval history.     PHYSICAL EXAM    There were no vitals taken for this visit.      Physical Exam done on 2024  eneral: NAD  HEENT: Normocephalic, atraumatic  Neck: Supple, No JVD  CV: Regular rate and rhythm, S1 and S2 are normal, no murmurs/rubs/or gallops  Lungs: Clear to auscultation  bilaterally, no rales/rhonchi/wheezes  Abdomen: Soft, non-distended, non-tender, bowel sounds present  EXT: No edema of lower extremities  Neuro: Cranial nerves II through XII intact      Result Review:  I have personally reviewed the results from the time of this admission to 1/30/2024 23:43 EST and agree with these findings:  [x]  Laboratory  []  Microbiology  [x]  Radiology  []  EKG/Telemetry   []  Cardiology/Vascular   []  Pathology  []  Old records  []  Other:      Assessment/Plan as per date patient was last seen on 1/27/2024  Patient presented with acute shortness of breath after recent surgery for left leg ischemia.  Labs significant with severe anemia and metabolic acidosis.  Probably possible GI bleeding in the setting of taking Eliquis and Plavix after the surgery.  Family decided to go for DNR/DNI with comfort care measures on 1/26.  Comfort care measures to be continued.   Continue with morphine and versed.        Hospice care was consulted - he is now admitted under the hospice care IP.      Code: DNR and DNI on comfort care measures        Admission Status:  I believe this patient meets Inpatient status.    Expected Length of Stay: 2 midnights     PDMP and Medication Dispenses via Sidebar reviewed and consistent with patient reported medications.    I discussed the patient's findings and my recommendations with the patient.      Sami Richards MD    1/30/2024    23:43 EST

## 2024-01-31 NOTE — DISCHARGE SUMMARY
[facility] Medicine Services  Discharge Summary    Date of Service: 24  Patient Name: Sohna De La Torre  : 1940  MRN: [id number]    Date of Admission: 2024  Date of Discharge:  24  Primary Care Physician: Tee Nicole MD    Discharge Diagnosis: GI bleed, left leg ischemia    Presenting Problem:   End of life care [Z51.5]    Active and Resolved Hospital Problems:  Active Hospital Problems    Diagnosis POA    **End of life care [Z51.5] Not Applicable      Resolved Hospital Problems   No resolved problems to display.            As per the HPI of the H&P:   Patient presented with acute shortness of breath after recent surgery for left leg ischemia.  Labs significant with severe anemia and metabolic acidosis.  Probably possible GI bleeding in the setting of taking Eliquis and Plavix after the surgery.  Family decided to go for DNR/DNI with comfort care.  Comfort care measures are started.  Continue with morphine antianxiety medication respiratory treatment oxygen supplement as needed to make patient as comfortable as possible.  Bicarb drip to be discontinued.  No blood transfusion.     Hospice care is consulted - and now admitted under the hospice care IP.      Patient was last seen and examined by this hospitalist on 2024 in the morning.  The case was discussed with his daughter.  She requested comfort care measures continue.  This hospitalist reviewed the chart and first cared for the patient on 24.        Patient  on 2024 at 2045. This hospitalist was off shift.          Discharge Exam    There were no vitals taken for this visit.    Patient was not examined at the time of death.  This hospitalist was off shift.      CODE STATUS:  Code Status and Medical Interventions:   Ordered at: 24 1705     Code Status (Patient has no pulse and is not breathing):    No CPR (Do Not Attempt to Resuscitate)     Medical Interventions (Patient has pulse or is  breathing):    Comfort Measures         No future appointments.      Time spent on Discharge including face to face service:  >30 minutes    Signature: Electronically signed by Sami Richards MD, 01/30/24, 23:47 EST.  [facility]

## (undated) DEVICE — SOL NACL 0.9PCT 1000ML

## (undated) DEVICE — QUICK-CROSS™ SUPPORT CATHETER: Brand: QUICK-CROSS™

## (undated) DEVICE — THE STERILE LIGHT HANDLE COVER IS USED WITH STERIS SURGICAL LIGHTING AND VISUALIZATION SYSTEMS.

## (undated) DEVICE — VLV SXN ENDO DISP STRL

## (undated) DEVICE — SUT VIC 4/0 SH 27IN J415H

## (undated) DEVICE — Device

## (undated) DEVICE — CATH EKOSONIC MACH4 DS 5.2F 50X135CM

## (undated) DEVICE — PATIENT RETURN ELECTRODE, SINGLE-USE, CONTACT QUALITY MONITORING, ADULT, WITH 9FT CORD, FOR PATIENTS WEIGING OVER 33LBS. (15KG): Brand: MEGADYNE

## (undated) DEVICE — PK ORTHO MINOR 40

## (undated) DEVICE — BLOOD COLLECTION/INFUSION SET,FEMALE LUER, 12 INCH (30.5 CM) TUBING: Brand: MONOJECT

## (undated) DEVICE — EXTENSION SET, MALE LUER LOCK ADAPTER WITH RETRACTABLE COLLAR

## (undated) DEVICE — TOTAL TRAY, 16FR 10ML SIL FOLEY, URN: Brand: MEDLINE

## (undated) DEVICE — GLV SURG BIOGEL LTX PF 7 1/2

## (undated) DEVICE — MARKR SKIN 2TP W/RULR

## (undated) DEVICE — PTA BALLOON DILATATION CATHETER: Brand: STERLING™

## (undated) DEVICE — RADIFOCUS TORQUE DEVICE MULTI-TORQUE VISE: Brand: RADIFOCUS TORQUE DEVICE

## (undated) DEVICE — STPCK 3/WY HP M/RA W/OFF/HNDL 1050PSI STRL

## (undated) DEVICE — STPCK 3WY HP ROT

## (undated) DEVICE — PENCL ES MEGADINE EZ/CLEAN BUTN W/HOLSTR 10FT

## (undated) DEVICE — ST ACC MICROPUNCTURE STFF .018 ECHO/PLDM/TP 4F/10CM 21G/7CM

## (undated) DEVICE — SUT PROLN 5/0 RB1 D/A 36IN 8556H

## (undated) DEVICE — SYR LL 3CC

## (undated) DEVICE — NAVICROSS SUPPORT CATHETER: Brand: NAVICROSS

## (undated) DEVICE — SYRINGE KIT,PACKAGED,,150FT,MK 7(ANGIO-ARTERION, 150ML SYR KIT W/QFT,MC)(60729385): Brand: MEDRAD® MARK 7 ARTERION DISPOSABLE SYRINGE 150 ML WITH QUICK FILL TUBE

## (undated) DEVICE — DESTINATION RENAL GUIDING SHEATH: Brand: DESTINATION

## (undated) DEVICE — OASIS DRAIN, SINGLE, INLINE & ATS COMPATIBLE: Brand: OASIS

## (undated) DEVICE — CATH IV INSYTE AUTOGARD SHLD 18G 1.25IN GRN

## (undated) DEVICE — BNDG ELAS ELITE V/CLOSE 6IN 5YD LF STRL

## (undated) DEVICE — LOU THORACIC: Brand: MEDLINE INDUSTRIES, INC.

## (undated) DEVICE — GLV SURG SENSICARE PI LF PF 7.5 GRN STRL

## (undated) DEVICE — GW THRUWAY TPR STR SHRT .014 300CM

## (undated) DEVICE — 2, DISPOSABLE SUCTION/IRRIGATOR WITH DISPOSABLE TIP: Brand: STRYKEFLOW

## (undated) DEVICE — ANTIBACTERIAL UNDYED BRAIDED (POLYGLACTIN 910), SYNTHETIC ABSORBABLE SUTURE: Brand: COATED VICRYL

## (undated) DEVICE — NDL HYPO ECLPS SFTY 22G 1 1/2IN

## (undated) DEVICE — TB PROSHIELD PROTECT PLSTC CLR

## (undated) DEVICE — UNDERGLV SURG BIOGEL INDICAT PF 8 GRN

## (undated) DEVICE — PK MINOR LAPAROTOMY 50

## (undated) DEVICE — DRAIN,WOUND,ROUND,24FR,5/16",FULL-FLUTED: Brand: MEDLINE

## (undated) DEVICE — SHEATH INTRO PINNACLE R/O2HIFLO 6F 2.5X10CM

## (undated) DEVICE — UNDERGLV SURG BIOGEL INDICATOR LF PF 7.5

## (undated) DEVICE — SPNG LAP CIGARETTE KITTNER 5MM STRL PK/5

## (undated) DEVICE — STPLR SKIN VISISTAT WD 35CT

## (undated) DEVICE — SUT ETHLN 3/0 FS1 30IN 669H

## (undated) DEVICE — SUT SILK 3/0 TIES 18IN A184H

## (undated) DEVICE — TBG PENCL TELESCP MEGADYNE SMOKE EVAC 15FT

## (undated) DEVICE — DRSNG GZ PETROLTM XEROFORM CURAD 1X8IN STRL

## (undated) DEVICE — CATH GUIDE SOFTVU FLUSH HT PIG .035 5F 65CM

## (undated) DEVICE — RADIFOCUS GLIDEWIRE: Brand: GLIDEWIRE

## (undated) DEVICE — CVR HNDL LT SURG ACCSSRY BLU STRL

## (undated) DEVICE — SEAL

## (undated) DEVICE — TURBO-ELITE™ LASER ATHERECTOMY CATHETER: Brand: TURBO-ELITE™

## (undated) DEVICE — GAUZE,SPONGE,FLUFF,6"X6.75",STRL,10/TRAY: Brand: MEDLINE

## (undated) DEVICE — DBD-DRAPE,LAP,CHOLE,W/TROUGHS,STERILE: Brand: MEDLINE

## (undated) DEVICE — SUT VIC 3/0 CTI 36IN J944H

## (undated) DEVICE — KT SURG TURNOVER 050

## (undated) DEVICE — MYNXGRIP 6F/7F: Brand: MYNXGRIP

## (undated) DEVICE — SOL ANTISTICK CAUTRY ELECTROLUBE LF

## (undated) DEVICE — SPNG GZ WOVN 4X4IN 12PLY 10/BX STRL

## (undated) DEVICE — SYR LL TP 10ML STRL

## (undated) DEVICE — MARKR SKIN W/RULR 2TP

## (undated) DEVICE — BANDAGE,GAUZE,BULKEE II,4.5"X4.1YD,STRL: Brand: MEDLINE

## (undated) DEVICE — DESTINATION PERIPHERAL GUIDING SHEATH: Brand: DESTINATION

## (undated) DEVICE — RADIFOCUS GLIDEWIRE ADVANTAGE GUIDEWIRE: Brand: GLIDEWIRE ADVANTAGE

## (undated) DEVICE — SNAR AMPLTZ GOOSENECK 10MM 120CM 4FR

## (undated) DEVICE — NDL HYPO PRECISIONGLIDE REG 25G 1 1/2

## (undated) DEVICE — CATH IV INSYTE AUTOGARD SHLD 20G 1IN PNK

## (undated) DEVICE — CVR PROB 96IN LF STRL

## (undated) DEVICE — TRAP FLD MINIVAC MEGADYNE 100ML

## (undated) DEVICE — EQUIPMENT COVER BAG TYPE 48” X 36” (122CM X 91CM): Brand: EQUIPMENT COVER BAG TYPE

## (undated) DEVICE — GLV SURG SIGNATURE ESSENTIAL PF LTX SZ8

## (undated) DEVICE — STRIP,CLOSURE,WOUND,MEDI-STRIP,1/2X4: Brand: MEDLINE

## (undated) DEVICE — DEV TORQ GW .010TO.038IN

## (undated) DEVICE — ELECTRD BLD EZ CLN MOD 4IN

## (undated) DEVICE — PK ANGIO 40

## (undated) DEVICE — INFLATION DEVICE: Brand: ENCORE™ 26

## (undated) DEVICE — PINNACLE R/O II INTRODUCER SHEATH WITH RADIOPAQUE MARKER: Brand: PINNACLE

## (undated) DEVICE — SUT ETHIB 0 CT2 CR8 18IN CX27D

## (undated) DEVICE — COLUMN DRAPE

## (undated) DEVICE — ISOLATION BAG: Brand: CONVERTORS

## (undated) DEVICE — PERCLOSE™ PROSTYLE™ SUTURE-MEDIATED CLOSURE AND REPAIR SYSTEM: Brand: PERCLOSE™ PROSTYLE™

## (undated) DEVICE — PENCL E/S ULTRAVAC TELESCP NOSE HOLSTR 10FT

## (undated) DEVICE — CATH ANGIO TRCN NB BCN .038 5F 65CM RIM

## (undated) DEVICE — CATH IV INSYTE AUTOGARD SHLD 16G 1 1/4

## (undated) DEVICE — SUT MNCRYL PLS ANTIB UD 4/0 PS2 18IN

## (undated) DEVICE — ENDOPATH XCEL BLADELESS TROCARS WITH STABILITY SLEEVES: Brand: ENDOPATH XCEL

## (undated) DEVICE — STPCK 3WY D201 DISCOFIX

## (undated) DEVICE — PK ATS CUST W CARDIOTOMY RESEVOIR

## (undated) DEVICE — INTROFLXOR CHECKFLO .074 ANL1 5F90

## (undated) DEVICE — TRY CATH UROMETER SIL 16F

## (undated) DEVICE — BALN ADMIRAL INPACT 5F 4X150MM 130CM

## (undated) DEVICE — BLAKE CARDIO CONNECTOR 1:1: Brand: J-VAC

## (undated) DEVICE — SLV SCD CALF HEMOFORCE DVT THERP REPROC MD

## (undated) DEVICE — SYR ARTERION MEDRAD MARK7

## (undated) DEVICE — LP VESL MAXI 2.5X1MM RED 2PK

## (undated) DEVICE — GLV SURG BIOGEL LTX PF 7

## (undated) DEVICE — APPL CHLORAPREP HI/LITE 26ML ORNG

## (undated) DEVICE — TBG EXT STD/BORE LL 30IN LF

## (undated) DEVICE — CONTRL DETACH AZUR HYDROCOIL SYS

## (undated) DEVICE — SYS PERFUS SEP PLATLT W TIPS CUST

## (undated) DEVICE — PINNACLE PRECISION ACCESS SYSTEM INTRODUCER SHEATH: Brand: PINNACLE PRECISION ACCESS SYSTEM

## (undated) DEVICE — FASTNR CATH PERC 5 TO12FR

## (undated) DEVICE — SUT SILK 4/0 TIES 18IN A183H

## (undated) DEVICE — KT CLN CLEANOR SCPE

## (undated) DEVICE — ADHS SKIN SURG TISS VISC PREMIERPRO EXOFIN HI/VISC FAST/DRY

## (undated) DEVICE — 3M™ STERI-DRAPE™ INSTRUMENT POUCH 1018L: Brand: STERI-DRAPE™

## (undated) DEVICE — PTA BALLOON DILATATION CATHETER: Brand: MUSTANG™

## (undated) DEVICE — PREMIUM WET SKIN PREP TRAY: Brand: MEDLINE INDUSTRIES, INC.

## (undated) DEVICE — GW GLIDEWIRE ADVANTAGE ANG .035IN 260X235X25CM

## (undated) DEVICE — SUREFORM 45 CURVED-TIP: Brand: SUREFORM

## (undated) DEVICE — PINNACLE INTRODUCER SHEATH: Brand: PINNACLE

## (undated) DEVICE — BALN DIL PTA ADVANCE LP 2.5X8 170 BX/1EA

## (undated) DEVICE — LAPAROSCOPIC SMOKE FILTRATION SYSTEM: Brand: PALL LAPAROSHIELD® PLUS LAPAROSCOPIC SMOKE FILTRATION SYSTEM

## (undated) DEVICE — GLIDESHEATH SLENDER NITINOL HYDROPHILIC COATED INTRODUCER SHEATH: Brand: GLIDESHEATH SLENDER

## (undated) DEVICE — PK AAA 40

## (undated) DEVICE — ADAPT CHECKFLO PERFORMER ASSEMBL

## (undated) DEVICE — ST BLD COL SAFETYLOK LS 21GA 3/4IN 12IN

## (undated) DEVICE — ANTIBACTERIAL VIOLET BRAIDED (POLYGLACTIN 910), SYNTHETIC ABSORBABLE SUTURE: Brand: COATED VICRYL

## (undated) DEVICE — KT VALVULOTOME 2 AND 3MM CUT HD

## (undated) DEVICE — SUT PROLN 6/0 BV1 D/A 30IN 8709H

## (undated) DEVICE — SUT SILK 2/0 SH CR5 18IN C0125

## (undated) DEVICE — PTA BALLOON DILATATION CATHETER: Brand: STERLING® SL

## (undated) DEVICE — REDUCER: Brand: ENDOWRIST

## (undated) DEVICE — DRP SLUSH WARMR MACH CIR 44X44IN

## (undated) DEVICE — SOL NS 500ML

## (undated) DEVICE — SOL IRR NACL 0.9PCT 1000ML

## (undated) DEVICE — ST IV INFUS ALARIS PUMP MODULE 2PC M/LL 23ML 109IN

## (undated) DEVICE — DRSNG PAD ABD 8X10IN STRL

## (undated) DEVICE — SUT SILK 2/0 TIES 18IN A185H

## (undated) DEVICE — COVADERM: Brand: DEROYAL

## (undated) DEVICE — GW GLIDEWIRE STD ANGL .035IN 3X180CM

## (undated) DEVICE — SYS CLS/REPR VESL PERCLOSEPROSTYLE W/SUT/TRIM SNAR/KNOT/PUSH

## (undated) DEVICE — VLV PRT BRONCH LIP LTX DISP

## (undated) DEVICE — CULT AER/ANAEROB FASTIDIOUS BACT

## (undated) DEVICE — ARM DRAPE

## (undated) DEVICE — CUFF SCD HEMOFORCE SEQ CALF STD MD

## (undated) DEVICE — SUT ETHLN 3/0 PS1 18IN 1663H

## (undated) DEVICE — ST ACC MICROPUNCTURE STFF .018 ECHO/PLAT/TP 4F/10CM 21G/7CM

## (undated) DEVICE — ELECTRD BLD EZ CLN MOD 2.5IN

## (undated) DEVICE — Device: Brand: TISSUE RETRIEVAL SYSTEM

## (undated) DEVICE — TBG PENCL TELESCP MEGADYNE SMOKE EVAC 10FT

## (undated) DEVICE — Device: Brand: ASTATO XS 20

## (undated) DEVICE — TBG INSUFFLATION LUER LOCK: Brand: MEDLINE INDUSTRIES, INC.